# Patient Record
Sex: MALE | Race: WHITE | NOT HISPANIC OR LATINO | Employment: FULL TIME | URBAN - METROPOLITAN AREA
[De-identification: names, ages, dates, MRNs, and addresses within clinical notes are randomized per-mention and may not be internally consistent; named-entity substitution may affect disease eponyms.]

---

## 2019-03-16 ENCOUNTER — APPOINTMENT (OUTPATIENT)
Dept: RADIOLOGY | Facility: MEDICAL CENTER | Age: 59
DRG: 958 | End: 2019-03-16
Attending: EMERGENCY MEDICINE
Payer: COMMERCIAL

## 2019-03-16 ENCOUNTER — APPOINTMENT (OUTPATIENT)
Dept: RADIOLOGY | Facility: MEDICAL CENTER | Age: 59
DRG: 958 | End: 2019-03-16
Attending: NEUROLOGICAL SURGERY
Payer: COMMERCIAL

## 2019-03-16 ENCOUNTER — HOSPITAL ENCOUNTER (INPATIENT)
Facility: MEDICAL CENTER | Age: 59
LOS: 10 days | DRG: 958 | End: 2019-03-26
Attending: EMERGENCY MEDICINE | Admitting: SURGERY
Payer: COMMERCIAL

## 2019-03-16 DIAGNOSIS — K68.3 RETROPERITONEAL HEMATOMA: ICD-10-CM
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
## 2019-03-16 DIAGNOSIS — S32.009A CLOSED FRACTURE OF LUMBAR SPINE WITHOUT SPINAL CORD LESION, INITIAL ENCOUNTER (HCC): ICD-10-CM

## 2019-03-16 DIAGNOSIS — S73.004A DISLOCATION OF RIGHT HIP, INITIAL ENCOUNTER (HCC): ICD-10-CM

## 2019-03-16 DIAGNOSIS — S32.421A CLOSED DISPLACED FRACTURE OF POSTERIOR WALL OF RIGHT ACETABULUM, INITIAL ENCOUNTER (HCC): ICD-10-CM

## 2019-03-16 PROBLEM — S36.892A TRAUMATIC RETROPERITONEAL HEMATOMA: Status: ACTIVE | Noted: 2019-03-16

## 2019-03-16 PROBLEM — S32.409A CLOSED FRACTURE OF ACETABULUM (HCC): Status: ACTIVE | Noted: 2019-03-16

## 2019-03-16 PROBLEM — Z53.09 CONTRAINDICATION TO DEEP VEIN THROMBOSIS (DVT) PROPHYLAXIS: Status: ACTIVE | Noted: 2019-03-16

## 2019-03-16 PROBLEM — T14.90XA TRAUMA: Status: ACTIVE | Noted: 2019-03-16

## 2019-03-16 LAB
ABO GROUP BLD: NORMAL
ALBUMIN SERPL BCP-MCNC: 4.1 G/DL (ref 3.2–4.9)
ALBUMIN/GLOB SERPL: 2.1 G/DL
ALP SERPL-CCNC: 49 U/L (ref 30–99)
ALT SERPL-CCNC: 21 U/L (ref 2–50)
ANION GAP SERPL CALC-SCNC: 10 MMOL/L (ref 0–11.9)
AST SERPL-CCNC: 34 U/L (ref 12–45)
BILIRUB SERPL-MCNC: 1.2 MG/DL (ref 0.1–1.5)
BLD GP AB SCN SERPL QL: NORMAL
BUN SERPL-MCNC: 16 MG/DL (ref 8–22)
CALCIUM SERPL-MCNC: 9.4 MG/DL (ref 8.5–10.5)
CHLORIDE SERPL-SCNC: 106 MMOL/L (ref 96–112)
CO2 SERPL-SCNC: 24 MMOL/L (ref 20–33)
CREAT SERPL-MCNC: 1.31 MG/DL (ref 0.5–1.4)
ERYTHROCYTE [DISTWIDTH] IN BLOOD BY AUTOMATED COUNT: 41.2 FL (ref 35.9–50)
ETHANOL BLD-MCNC: 0.01 G/DL
GLOBULIN SER CALC-MCNC: 2 G/DL (ref 1.9–3.5)
GLUCOSE SERPL-MCNC: 144 MG/DL (ref 65–99)
HCT VFR BLD AUTO: 43.5 % (ref 42–52)
HGB BLD-MCNC: 13.9 G/DL (ref 14–18)
HGB BLD-MCNC: 15.6 G/DL (ref 14–18)
LACTATE BLD-SCNC: 1.5 MMOL/L (ref 0.5–2)
MCH RBC QN AUTO: 32.5 PG (ref 27–33)
MCHC RBC AUTO-ENTMCNC: 35.9 G/DL (ref 33.7–35.3)
MCV RBC AUTO: 90.6 FL (ref 81.4–97.8)
PLATELET # BLD AUTO: 256 K/UL (ref 164–446)
PMV BLD AUTO: 10.7 FL (ref 9–12.9)
POTASSIUM SERPL-SCNC: 3.9 MMOL/L (ref 3.6–5.5)
PROT SERPL-MCNC: 6.1 G/DL (ref 6–8.2)
RBC # BLD AUTO: 4.8 M/UL (ref 4.7–6.1)
RH BLD: NORMAL
SODIUM SERPL-SCNC: 140 MMOL/L (ref 135–145)
WBC # BLD AUTO: 18.4 K/UL (ref 4.8–10.8)

## 2019-03-16 PROCEDURE — 770022 HCHG ROOM/CARE - ICU (200)

## 2019-03-16 PROCEDURE — 85018 HEMOGLOBIN: CPT

## 2019-03-16 PROCEDURE — 86901 BLOOD TYPING SEROLOGIC RH(D): CPT

## 2019-03-16 PROCEDURE — 27250 TREAT HIP DISLOCATION: CPT

## 2019-03-16 PROCEDURE — 2W6NX0Z TRACTION OF RIGHT UPPER LEG USING TRACTION APPARATUS: ICD-10-PCS | Performed by: ORTHOPAEDIC SURGERY

## 2019-03-16 PROCEDURE — 700105 HCHG RX REV CODE 258: Performed by: NURSE PRACTITIONER

## 2019-03-16 PROCEDURE — 72125 CT NECK SPINE W/O DYE: CPT

## 2019-03-16 PROCEDURE — G0390 TRAUMA RESPONS W/HOSP CRITI: HCPCS

## 2019-03-16 PROCEDURE — 700117 HCHG RX CONTRAST REV CODE 255: Performed by: EMERGENCY MEDICINE

## 2019-03-16 PROCEDURE — A9270 NON-COVERED ITEM OR SERVICE: HCPCS | Performed by: NURSE PRACTITIONER

## 2019-03-16 PROCEDURE — 96374 THER/PROPH/DIAG INJ IV PUSH: CPT

## 2019-03-16 PROCEDURE — 51798 US URINE CAPACITY MEASURE: CPT

## 2019-03-16 PROCEDURE — 96376 TX/PRO/DX INJ SAME DRUG ADON: CPT

## 2019-03-16 PROCEDURE — 80053 COMPREHEN METABOLIC PANEL: CPT

## 2019-03-16 PROCEDURE — 96375 TX/PRO/DX INJ NEW DRUG ADDON: CPT

## 2019-03-16 PROCEDURE — 80307 DRUG TEST PRSMV CHEM ANLYZR: CPT

## 2019-03-16 PROCEDURE — 0SS9XZZ REPOSITION RIGHT HIP JOINT, EXTERNAL APPROACH: ICD-10-PCS | Performed by: EMERGENCY MEDICINE

## 2019-03-16 PROCEDURE — 700111 HCHG RX REV CODE 636 W/ 250 OVERRIDE (IP): Performed by: ORTHOPAEDIC SURGERY

## 2019-03-16 PROCEDURE — 700102 HCHG RX REV CODE 250 W/ 637 OVERRIDE(OP): Performed by: NURSE PRACTITIONER

## 2019-03-16 PROCEDURE — 86900 BLOOD TYPING SEROLOGIC ABO: CPT

## 2019-03-16 PROCEDURE — 72170 X-RAY EXAM OF PELVIS: CPT

## 2019-03-16 PROCEDURE — 85384 FIBRINOGEN ACTIVITY: CPT

## 2019-03-16 PROCEDURE — 72128 CT CHEST SPINE W/O DYE: CPT

## 2019-03-16 PROCEDURE — 72148 MRI LUMBAR SPINE W/O DYE: CPT

## 2019-03-16 PROCEDURE — 86850 RBC ANTIBODY SCREEN: CPT

## 2019-03-16 PROCEDURE — 83605 ASSAY OF LACTIC ACID: CPT

## 2019-03-16 PROCEDURE — 700111 HCHG RX REV CODE 636 W/ 250 OVERRIDE (IP): Performed by: NURSE PRACTITIONER

## 2019-03-16 PROCEDURE — 304561 HCHG STAT O2

## 2019-03-16 PROCEDURE — 85576 BLOOD PLATELET AGGREGATION: CPT | Mod: 91

## 2019-03-16 PROCEDURE — 85027 COMPLETE CBC AUTOMATED: CPT

## 2019-03-16 PROCEDURE — 85347 COAGULATION TIME ACTIVATED: CPT

## 2019-03-16 PROCEDURE — 99291 CRITICAL CARE FIRST HOUR: CPT

## 2019-03-16 PROCEDURE — 71260 CT THORAX DX C+: CPT

## 2019-03-16 PROCEDURE — 0QS4XZZ REPOSITION RIGHT ACETABULUM, EXTERNAL APPROACH: ICD-10-PCS | Performed by: ORTHOPAEDIC SURGERY

## 2019-03-16 PROCEDURE — 71045 X-RAY EXAM CHEST 1 VIEW: CPT

## 2019-03-16 PROCEDURE — 72131 CT LUMBAR SPINE W/O DYE: CPT

## 2019-03-16 PROCEDURE — 70450 CT HEAD/BRAIN W/O DYE: CPT

## 2019-03-16 PROCEDURE — 700111 HCHG RX REV CODE 636 W/ 250 OVERRIDE (IP): Performed by: EMERGENCY MEDICINE

## 2019-03-16 RX ORDER — AMOXICILLIN 250 MG
1 CAPSULE ORAL NIGHTLY
Status: DISCONTINUED | OUTPATIENT
Start: 2019-03-16 | End: 2019-03-18

## 2019-03-16 RX ORDER — ACETAMINOPHEN 500 MG
1000 TABLET ORAL EVERY 6 HOURS
Status: DISCONTINUED | OUTPATIENT
Start: 2019-03-16 | End: 2019-03-18

## 2019-03-16 RX ORDER — FAMOTIDINE 20 MG/1
20 TABLET, FILM COATED ORAL 2 TIMES DAILY
Status: DISCONTINUED | OUTPATIENT
Start: 2019-03-16 | End: 2019-03-19

## 2019-03-16 RX ORDER — POLYETHYLENE GLYCOL 3350 17 G/17G
1 POWDER, FOR SOLUTION ORAL 2 TIMES DAILY
Status: DISCONTINUED | OUTPATIENT
Start: 2019-03-16 | End: 2019-03-18

## 2019-03-16 RX ORDER — BISACODYL 10 MG
10 SUPPOSITORY, RECTAL RECTAL
Status: DISCONTINUED | OUTPATIENT
Start: 2019-03-16 | End: 2019-03-18

## 2019-03-16 RX ORDER — ACETAMINOPHEN 325 MG/1
650 TABLET ORAL EVERY 4 HOURS PRN
Status: DISCONTINUED | OUTPATIENT
Start: 2019-03-16 | End: 2019-03-26 | Stop reason: HOSPADM

## 2019-03-16 RX ORDER — ENEMA 19; 7 G/133ML; G/133ML
1 ENEMA RECTAL
Status: DISCONTINUED | OUTPATIENT
Start: 2019-03-16 | End: 2019-03-18

## 2019-03-16 RX ORDER — HYDROMORPHONE HYDROCHLORIDE 1 MG/ML
1 INJECTION, SOLUTION INTRAMUSCULAR; INTRAVENOUS; SUBCUTANEOUS ONCE
Status: COMPLETED | OUTPATIENT
Start: 2019-03-16 | End: 2019-03-16

## 2019-03-16 RX ORDER — PROPOFOL 10 MG/ML
200 INJECTION, EMULSION INTRAVENOUS ONCE
Status: COMPLETED | OUTPATIENT
Start: 2019-03-16 | End: 2019-03-16

## 2019-03-16 RX ORDER — M-VIT,TX,IRON,MINS/CALC/FOLIC 27MG-0.4MG
1 TABLET ORAL DAILY
Status: ON HOLD | COMMUNITY
End: 2019-04-01

## 2019-03-16 RX ORDER — LORAZEPAM 2 MG/ML
INJECTION INTRAMUSCULAR
Status: COMPLETED | OUTPATIENT
Start: 2019-03-16 | End: 2019-03-16

## 2019-03-16 RX ORDER — AMOXICILLIN 250 MG
1 CAPSULE ORAL
Status: DISCONTINUED | OUTPATIENT
Start: 2019-03-16 | End: 2019-03-18

## 2019-03-16 RX ORDER — ONDANSETRON 2 MG/ML
4 INJECTION INTRAMUSCULAR; INTRAVENOUS EVERY 4 HOURS PRN
Status: DISCONTINUED | OUTPATIENT
Start: 2019-03-16 | End: 2019-03-18

## 2019-03-16 RX ORDER — DOCUSATE SODIUM 100 MG/1
100 CAPSULE, LIQUID FILLED ORAL 2 TIMES DAILY
Status: DISCONTINUED | OUTPATIENT
Start: 2019-03-16 | End: 2019-03-18

## 2019-03-16 RX ORDER — OXYCODONE HYDROCHLORIDE 5 MG/1
5 TABLET ORAL
Status: DISCONTINUED | OUTPATIENT
Start: 2019-03-16 | End: 2019-03-18

## 2019-03-16 RX ORDER — ACETAMINOPHEN 650 MG/1
650 SUPPOSITORY RECTAL EVERY 4 HOURS PRN
Status: DISCONTINUED | OUTPATIENT
Start: 2019-03-16 | End: 2019-03-26 | Stop reason: HOSPADM

## 2019-03-16 RX ORDER — OXYCODONE HYDROCHLORIDE 10 MG/1
10 TABLET ORAL
Status: DISCONTINUED | OUTPATIENT
Start: 2019-03-16 | End: 2019-03-18

## 2019-03-16 RX ORDER — MORPHINE SULFATE 4 MG/ML
4 INJECTION, SOLUTION INTRAMUSCULAR; INTRAVENOUS
Status: DISCONTINUED | OUTPATIENT
Start: 2019-03-16 | End: 2019-03-26 | Stop reason: HOSPADM

## 2019-03-16 RX ORDER — SODIUM CHLORIDE 9 MG/ML
INJECTION, SOLUTION INTRAVENOUS CONTINUOUS
Status: DISCONTINUED | OUTPATIENT
Start: 2019-03-16 | End: 2019-03-20

## 2019-03-16 RX ADMIN — PROPOFOL 100 MG: 10 INJECTION, EMULSION INTRAVENOUS at 20:55

## 2019-03-16 RX ADMIN — PROPOFOL 25 MG: 10 INJECTION, EMULSION INTRAVENOUS at 16:00

## 2019-03-16 RX ADMIN — OXYCODONE HYDROCHLORIDE 10 MG: 10 TABLET ORAL at 23:03

## 2019-03-16 RX ADMIN — FENTANYL CITRATE 100 MCG: 50 INJECTION INTRAMUSCULAR; INTRAVENOUS at 20:55

## 2019-03-16 RX ADMIN — OXYCODONE HYDROCHLORIDE 10 MG: 10 TABLET ORAL at 20:00

## 2019-03-16 RX ADMIN — PROPOFOL 50 MG: 10 INJECTION, EMULSION INTRAVENOUS at 15:55

## 2019-03-16 RX ADMIN — MORPHINE SULFATE 4 MG: 4 INJECTION INTRAVENOUS at 20:45

## 2019-03-16 RX ADMIN — HYDROMORPHONE HYDROCHLORIDE 1 MG: 1 INJECTION, SOLUTION INTRAMUSCULAR; INTRAVENOUS; SUBCUTANEOUS at 15:38

## 2019-03-16 RX ADMIN — MORPHINE SULFATE 4 MG: 4 INJECTION INTRAVENOUS at 18:07

## 2019-03-16 RX ADMIN — SODIUM CHLORIDE: 9 INJECTION, SOLUTION INTRAVENOUS at 22:00

## 2019-03-16 RX ADMIN — IOHEXOL 100 ML: 350 INJECTION, SOLUTION INTRAVENOUS at 16:37

## 2019-03-16 RX ADMIN — FAMOTIDINE 20 MG: 20 TABLET ORAL at 19:53

## 2019-03-16 RX ADMIN — LORAZEPAM 1 MG: 2 INJECTION INTRAMUSCULAR; INTRAVENOUS at 15:44

## 2019-03-16 RX ADMIN — HYDROMORPHONE HYDROCHLORIDE 1 MG: 1 INJECTION, SOLUTION INTRAMUSCULAR; INTRAVENOUS; SUBCUTANEOUS at 16:39

## 2019-03-16 ASSESSMENT — COPD QUESTIONNAIRES
COPD SCREENING SCORE: 1
DURING THE PAST 4 WEEKS HOW MUCH DID YOU FEEL SHORT OF BREATH: NONE/LITTLE OF THE TIME
HAVE YOU SMOKED AT LEAST 100 CIGARETTES IN YOUR ENTIRE LIFE: NO/DON'T KNOW
DO YOU EVER COUGH UP ANY MUCUS OR PHLEGM?: NO/ONLY WITH OCCASIONAL COLDS OR INFECTIONS

## 2019-03-16 ASSESSMENT — LIFESTYLE VARIABLES
ALCOHOL_USE: NO
EVER_SMOKED: YES

## 2019-03-16 ASSESSMENT — PATIENT HEALTH QUESTIONNAIRE - PHQ9
1. LITTLE INTEREST OR PLEASURE IN DOING THINGS: NOT AT ALL
SUM OF ALL RESPONSES TO PHQ9 QUESTIONS 1 AND 2: 0
2. FEELING DOWN, DEPRESSED, IRRITABLE, OR HOPELESS: NOT AT ALL
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
## 2019-03-16 NOTE — ED NOTES
Received report from terry grimes assumed care done.  Pt c/o pain in right hip rates as 7/10. erp notified. Pt now awake and alert. VSS.

## 2019-03-16 NOTE — ED NOTES
Med Rec Updated and Complete per Pt at bedside  Allergies Reviewed  No PO ABX last 30 days.    Pt denies RX medication at this time.
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
## 2019-03-17 ENCOUNTER — APPOINTMENT (OUTPATIENT)
Dept: RADIOLOGY | Facility: MEDICAL CENTER | Age: 59
DRG: 958 | End: 2019-03-17
Attending: NURSE PRACTITIONER
Payer: COMMERCIAL

## 2019-03-17 LAB
ABO GROUP BLD: NORMAL
ALBUMIN SERPL BCP-MCNC: 3.8 G/DL (ref 3.2–4.9)
ALBUMIN/GLOB SERPL: 2.1 G/DL
ALP SERPL-CCNC: 39 U/L (ref 30–99)
ALT SERPL-CCNC: 25 U/L (ref 2–50)
ANION GAP SERPL CALC-SCNC: 8 MMOL/L (ref 0–11.9)
APTT PPP: 29.9 SEC (ref 24.7–36)
AST SERPL-CCNC: 53 U/L (ref 12–45)
BASOPHILS # BLD AUTO: 0.2 % (ref 0–1.8)
BASOPHILS # BLD: 0.02 K/UL (ref 0–0.12)
BILIRUB SERPL-MCNC: 1.6 MG/DL (ref 0.1–1.5)
BUN SERPL-MCNC: 13 MG/DL (ref 8–22)
CALCIUM SERPL-MCNC: 8.3 MG/DL (ref 8.5–10.5)
CFT BLD TEG: 2.9 MIN (ref 5–10)
CHLORIDE SERPL-SCNC: 107 MMOL/L (ref 96–112)
CLOT ANGLE BLD TEG: 72.2 DEGREES (ref 53–72)
CO2 SERPL-SCNC: 25 MMOL/L (ref 20–33)
CREAT SERPL-MCNC: 0.98 MG/DL (ref 0.5–1.4)
CT.EXTRINSIC BLD ROTEM: 1.5 MIN (ref 1–3)
EOSINOPHIL # BLD AUTO: 0.01 K/UL (ref 0–0.51)
EOSINOPHIL NFR BLD: 0.1 % (ref 0–6.9)
ERYTHROCYTE [DISTWIDTH] IN BLOOD BY AUTOMATED COUNT: 44.7 FL (ref 35.9–50)
GLOBULIN SER CALC-MCNC: 1.8 G/DL (ref 1.9–3.5)
GLUCOSE SERPL-MCNC: 135 MG/DL (ref 65–99)
HCT VFR BLD AUTO: 38 % (ref 42–52)
HGB BLD-MCNC: 12.1 G/DL (ref 14–18)
HGB BLD-MCNC: 13 G/DL (ref 14–18)
IMM GRANULOCYTES # BLD AUTO: 0.03 K/UL (ref 0–0.11)
IMM GRANULOCYTES NFR BLD AUTO: 0.4 % (ref 0–0.9)
INR PPP: 1.13 (ref 0.87–1.13)
LYMPHOCYTES # BLD AUTO: 0.88 K/UL (ref 1–4.8)
LYMPHOCYTES NFR BLD: 10.9 % (ref 22–41)
MCF BLD TEG: 66.3 MM (ref 50–70)
MCH RBC QN AUTO: 32.3 PG (ref 27–33)
MCHC RBC AUTO-ENTMCNC: 34.2 G/DL (ref 33.7–35.3)
MCV RBC AUTO: 94.5 FL (ref 81.4–97.8)
MONOCYTES # BLD AUTO: 0.96 K/UL (ref 0–0.85)
MONOCYTES NFR BLD AUTO: 11.9 % (ref 0–13.4)
NEUTROPHILS # BLD AUTO: 6.15 K/UL (ref 1.82–7.42)
NEUTROPHILS NFR BLD: 76.5 % (ref 44–72)
NRBC # BLD AUTO: 0 K/UL
NRBC BLD-RTO: 0 /100 WBC
PA AA BLD-ACNC: 18 %
PA ADP BLD-ACNC: 25.2 %
PLATELET # BLD AUTO: 201 K/UL (ref 164–446)
PMV BLD AUTO: 10.7 FL (ref 9–12.9)
POTASSIUM SERPL-SCNC: 4 MMOL/L (ref 3.6–5.5)
PROT SERPL-MCNC: 5.6 G/DL (ref 6–8.2)
PROTHROMBIN TIME: 14.6 SEC (ref 12–14.6)
RBC # BLD AUTO: 4.02 M/UL (ref 4.7–6.1)
RH BLD: NORMAL
SODIUM SERPL-SCNC: 140 MMOL/L (ref 135–145)
TEG ALGORITHM TGALG: ABNORMAL
WBC # BLD AUTO: 8.1 K/UL (ref 4.8–10.8)

## 2019-03-17 PROCEDURE — 85610 PROTHROMBIN TIME: CPT

## 2019-03-17 PROCEDURE — 85025 COMPLETE CBC W/AUTO DIFF WBC: CPT

## 2019-03-17 PROCEDURE — 71045 X-RAY EXAM CHEST 1 VIEW: CPT | Performed by: RADIOLOGY

## 2019-03-17 PROCEDURE — 770022 HCHG ROOM/CARE - ICU (200)

## 2019-03-17 PROCEDURE — 85018 HEMOGLOBIN: CPT

## 2019-03-17 PROCEDURE — 700112 HCHG RX REV CODE 229: Performed by: NURSE PRACTITIONER

## 2019-03-17 PROCEDURE — A9270 NON-COVERED ITEM OR SERVICE: HCPCS | Performed by: NURSE PRACTITIONER

## 2019-03-17 PROCEDURE — 700105 HCHG RX REV CODE 258: Performed by: NURSE PRACTITIONER

## 2019-03-17 PROCEDURE — 93970 EXTREMITY STUDY: CPT

## 2019-03-17 PROCEDURE — 99291 CRITICAL CARE FIRST HOUR: CPT | Performed by: SURGERY

## 2019-03-17 PROCEDURE — 80053 COMPREHEN METABOLIC PANEL: CPT

## 2019-03-17 PROCEDURE — 71045 X-RAY EXAM CHEST 1 VIEW: CPT

## 2019-03-17 PROCEDURE — 700111 HCHG RX REV CODE 636 W/ 250 OVERRIDE (IP): Performed by: NURSE PRACTITIONER

## 2019-03-17 PROCEDURE — 700102 HCHG RX REV CODE 250 W/ 637 OVERRIDE(OP): Performed by: NURSE PRACTITIONER

## 2019-03-17 PROCEDURE — 51798 US URINE CAPACITY MEASURE: CPT

## 2019-03-17 PROCEDURE — 85730 THROMBOPLASTIN TIME PARTIAL: CPT

## 2019-03-17 PROCEDURE — 93970 EXTREMITY STUDY: CPT | Mod: 26 | Performed by: SURGERY

## 2019-03-17 RX ADMIN — ONDANSETRON 4 MG: 2 INJECTION INTRAMUSCULAR; INTRAVENOUS at 23:17

## 2019-03-17 RX ADMIN — SENNOSIDES AND DOCUSATE SODIUM 1 TABLET: 8.6; 5 TABLET ORAL at 21:22

## 2019-03-17 RX ADMIN — DOCUSATE SODIUM 100 MG: 100 CAPSULE, LIQUID FILLED ORAL at 17:11

## 2019-03-17 RX ADMIN — MORPHINE SULFATE 4 MG: 4 INJECTION INTRAVENOUS at 17:11

## 2019-03-17 RX ADMIN — ACETAMINOPHEN 1000 MG: 500 TABLET ORAL at 17:11

## 2019-03-17 RX ADMIN — OXYCODONE HYDROCHLORIDE 10 MG: 10 TABLET ORAL at 22:35

## 2019-03-17 RX ADMIN — OXYCODONE HYDROCHLORIDE 10 MG: 10 TABLET ORAL at 16:08

## 2019-03-17 RX ADMIN — ACETAMINOPHEN 1000 MG: 500 TABLET ORAL at 12:12

## 2019-03-17 RX ADMIN — OXYCODONE HYDROCHLORIDE 10 MG: 10 TABLET ORAL at 06:00

## 2019-03-17 RX ADMIN — OXYCODONE HYDROCHLORIDE 10 MG: 10 TABLET ORAL at 19:30

## 2019-03-17 RX ADMIN — MORPHINE SULFATE 4 MG: 4 INJECTION INTRAVENOUS at 23:17

## 2019-03-17 RX ADMIN — MORPHINE SULFATE 4 MG: 4 INJECTION INTRAVENOUS at 13:31

## 2019-03-17 RX ADMIN — FAMOTIDINE 20 MG: 20 TABLET ORAL at 05:04

## 2019-03-17 RX ADMIN — SODIUM CHLORIDE: 9 INJECTION, SOLUTION INTRAVENOUS at 07:21

## 2019-03-17 RX ADMIN — OXYCODONE HYDROCHLORIDE 10 MG: 10 TABLET ORAL at 02:32

## 2019-03-17 RX ADMIN — FAMOTIDINE 20 MG: 20 TABLET ORAL at 17:11

## 2019-03-17 RX ADMIN — DOCUSATE SODIUM 100 MG: 100 CAPSULE, LIQUID FILLED ORAL at 05:04

## 2019-03-17 RX ADMIN — OXYCODONE HYDROCHLORIDE 10 MG: 10 TABLET ORAL at 09:02

## 2019-03-17 RX ADMIN — MORPHINE SULFATE 4 MG: 4 INJECTION INTRAVENOUS at 10:34

## 2019-03-17 RX ADMIN — MORPHINE SULFATE 4 MG: 4 INJECTION INTRAVENOUS at 20:11

## 2019-03-17 RX ADMIN — MORPHINE SULFATE 4 MG: 4 INJECTION INTRAVENOUS at 04:00

## 2019-03-17 RX ADMIN — MORPHINE SULFATE 4 MG: 4 INJECTION INTRAVENOUS at 07:10

## 2019-03-17 RX ADMIN — ACETAMINOPHEN 1000 MG: 500 TABLET ORAL at 05:04

## 2019-03-17 RX ADMIN — OXYCODONE HYDROCHLORIDE 10 MG: 10 TABLET ORAL at 12:12

## 2019-03-17 RX ADMIN — MORPHINE SULFATE 4 MG: 4 INJECTION INTRAVENOUS at 00:30

## 2019-03-17 ASSESSMENT — COGNITIVE AND FUNCTIONAL STATUS - GENERAL
SUGGESTED CMS G CODE MODIFIER MOBILITY: CM
WALKING IN HOSPITAL ROOM: TOTAL
MOVING TO AND FROM BED TO CHAIR: A LOT
MOVING FROM LYING ON BACK TO SITTING ON SIDE OF FLAT BED: A LOT
DAILY ACTIVITIY SCORE: 13
SUGGESTED CMS G CODE MODIFIER DAILY ACTIVITY: CL
DRESSING REGULAR UPPER BODY CLOTHING: A LOT
TOILETING: A LOT
CLIMB 3 TO 5 STEPS WITH RAILING: TOTAL
STANDING UP FROM CHAIR USING ARMS: TOTAL
DRESSING REGULAR LOWER BODY CLOTHING: A LOT
MOBILITY SCORE: 9
HELP NEEDED FOR BATHING: A LOT
EATING MEALS: A LITTLE
TURNING FROM BACK TO SIDE WHILE IN FLAT BAD: A LOT
PERSONAL GROOMING: A LOT

## 2019-03-17 ASSESSMENT — ENCOUNTER SYMPTOMS
CARDIOVASCULAR NEGATIVE: 1
RESPIRATORY NEGATIVE: 1
CONSTITUTIONAL NEGATIVE: 1

## 2019-03-17 NOTE — PROGRESS NOTES
Neurosurgery Progress Note    Subjective:  The patient continues to complain of profound right leg weakness  Currently in traction  Denies neck pain, UE N/T, weakness     Exam:  Awake, alert, oriented x3, motor strength in the left leg is 4- out of 5,   right leg is now in traction  Able to wiggle toes, 3- dorsiflexion and plantar flexion  Sensation is diminished in the right leg from L4-S1  UE strength 5/5 deltoid, biceps, triceps, handgrip   Full sensation C4-T1   In C collar     BP  Min: 115/72  Max: 151/105  Pulse  Av.7  Min: 73  Max: 108  Resp  Av.2  Min: 5  Max: 57  Temp  Av.5 °C (97.7 °F)  Min: 36.3 °C (97.3 °F)  Max: 36.8 °C (98.2 °F)  SpO2  Av.6 %  Min: 90 %  Max: 100 %    No Data Recorded    Recent Labs      19   1535  19   2315  19   0530   WBC  18.4*   --   8.1   RBC  4.80   --   4.02*   HEMOGLOBIN  15.6  13.9*  13.0*   HEMATOCRIT  43.5   --   38.0*   MCV  90.6   --   94.5   MCH  32.5   --   32.3   MCHC  35.9*   --   34.2   RDW  41.2   --   44.7   PLATELETCT  256   --   201   MPV  10.7   --   10.7     Recent Labs      19   1535  19   0530   SODIUM  140  140   POTASSIUM  3.9  4.0   CHLORIDE  106  107   CO2  24  25   GLUCOSE  144*  135*   BUN  16  13   CREATININE  1.31  0.98   CALCIUM  9.4  8.3*         Recent Labs      19   2315   REACTMIN  2.9*   CLOTKINET  1.5   CLOTANGL  72.2*   MAXCLOTS  66.3   PRCINADP  25.2   PRCINAA  18.0       Intake/Output       19 0700 - 19 0659 19 07 - 19 0659      4552-3248 8811-9717 Total 7027-7632 5409-6162 Total       Intake    I.V.  200  1005 1205  --  -- --    Pre-Hospital Volume 200 -- 200 -- -- --    Trauma Resuscitation Volume 0 -- 0 -- -- --    Propofol Volume -- 5 5 -- -- --    Volume (mL) (NS infusion) -- 1000 1000 -- -- --    Blood  0  -- 0  --  -- --    PRBC Total Volume (Non-Barcoded) 0 -- 0 -- -- --    FFP Total Volume (Non-Barcoded) 0 -- 0 -- -- --    Platelets Total Volume  (Non-Barcoded) 0 -- 0 -- -- --    Cryoprecipitate (Pooled) Total Volume (Non-Barcoded) 0 -- 0 -- -- --    Total Intake 200 1005 1205 -- -- --       Output    Urine  --  450 450  --  -- --    Straight Catheter -- 450 450 -- -- --    Other  0  -- 0  --  -- --    Pre-Hospital Output 0 -- 0 -- -- --    Trauma Resuscitation Output 0 -- 0 -- -- --    Blood  0  -- 0  --  -- --    Est. Blood Loss 0 -- 0 -- -- --    Total Output 0 450 450 -- -- --       Net I/O     200 555 755 -- -- --            Intake/Output Summary (Last 24 hours) at 03/17/19 0951  Last data filed at 03/17/19 0600   Gross per 24 hour   Intake             1205 ml   Output              450 ml   Net              755 ml       $ Bladder Scan Results (mL): 246    • Respiratory Care per Protocol   Continuous RT   • Pharmacy Consult Request  1 Each PHARMACY TO DOSE   • docusate sodium  100 mg BID   • senna-docusate  1 Tab Nightly   • senna-docusate  1 Tab Q24HRS PRN   • polyethylene glycol/lytes  1 Packet BID   • magnesium hydroxide  30 mL DAILY   • bisacodyl  10 mg Q24HRS PRN   • fleet  1 Each Once PRN   • NS   Continuous   • acetaminophen  1,000 mg Q6HRS   • oxyCODONE immediate-release  5 mg Q3HRS PRN   • oxyCODONE immediate release  10 mg Q3HRS PRN   • morphine injection  4 mg Q3HRS PRN   • ondansetron  4 mg Q4HRS PRN   • famotidine  20 mg BID    Or   • famotidine  20 mg BID   • acetaminophen  650 mg Q4HRS PRN    Or   • acetaminophen  650 mg Q4HRS PRN       Assessment and Plan:  Hospital day #2  Prophylactic anticoagulation: yes         Start date/time: OK for today, stop at 1200 on 3/18  Plan for ORIF tomorrow by Dr. Aldana  Will look to schedule patient for L4-S1 ALIF and L4-S1 laminectomy, instrumentation, fusion  Risks, benefits, alternatives to surgery were discussed with the patient. I explained the surgery using spine diagrams.   He is eager to proceed with surgery  Consent ordered     Patient agrees with treatment plan.   Case discussed with   Toni.

## 2019-03-17 NOTE — PROGRESS NOTES
Call to ER to see if patient is ready for . Per ER RN, patient to go to STAT MRI. ER RN to call SICU RN back if SICU RN needs to take patient to MRI
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
## 2019-03-17 NOTE — RESPIRATORY CARE
Conscious Sedation Respiratory Update       Pt. Placed on end tidal co2 cannula. With a flow of 10 lpm.  Ambul bag at bedside, Oxymask added at 15 lpm.  Pt. Is semi conscious during leg adjustment by ortho Dr.   Procedure went well.  END Tidal CO2 range from 17-31 cwp, And later 38cwp post medications.

## 2019-03-17 NOTE — ED NOTES
Bedside report to Zulema DEUTSCH. Pt transported to MRI by SICU RN on zoll monitor w/stable vitals accompanied by another ED RN for MRI transport assistance.
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
## 2019-03-17 NOTE — PROGRESS NOTES
Continued from previous note for procedure.     2119- Dr. Lunsford at bedside for potential for airway manage.     2120- 50mg propofol administered per MD order.   2122- Dr. Aldana per formed pin placement. Patient VSS through procedure.   End Tidal CO2 38    Traction placed to patient leg at 20lbs.    2123- End procedure time

## 2019-03-17 NOTE — PROGRESS NOTES
Neurosurgery Progress Note    Subjective:  The patient continues to complain of profound right leg weakness    Exam:  Awake, alert, oriented x3, motor strength in the left leg is 4 out of 5, right leg is now in traction, right leg is still profoundly weak as it has been since admission, sensation is diminished in the right leg from L4-S1    CT lumbar 3/16/19    FINDINGS:  The lumbar vertebral bodies and are normal in height.    Lumbar vertebral bodies are normally aligned.    There are right L1-L5 transverse process fracture.    There is fracture of the right L5 pedicle extending into the spinous process in the posterior aspect of the vertebral body. There is subluxation at the right L5-S1 facet joint.    There are fractures of the left L3-L5 transverse processes.    There is left L5 lamina fracture    There is likely anterior epidural hematoma of the lower lumbar spine. Assessment on CT is somewhat limited. It is very likely there is significant spinal canal narrowing at L3-L5    There is a left L5 pars articularis fracture.    There is an L4 spinous process fracture.    The vertebral bodies appear intact.    There is posterior dislocation of the left hip joint.      BP  Min: 115/72  Max: 151/105  Pulse  Av  Min: 89  Max: 108  Resp  Av.4  Min: 5  Max: 48  Temp  Av.5 °C (97.7 °F)  Min: 36.3 °C (97.3 °F)  Max: 36.8 °C (98.2 °F)  SpO2  Av.5 %  Min: 90 %  Max: 100 %    No Data Recorded    Recent Labs      19   1535  19   2315  19   0530   WBC  18.4*   --   8.1   RBC  4.80   --   4.02*   HEMOGLOBIN  15.6  13.9*  13.0*   HEMATOCRIT  43.5   --   38.0*   MCV  90.6   --   94.5   MCH  32.5   --   32.3   MCHC  35.9*   --   34.2   RDW  41.2   --   44.7   PLATELETCT  256   --   201   MPV  10.7   --   10.7     Recent Labs      19   1535  19   0530   SODIUM  140  140   POTASSIUM  3.9  4.0   CHLORIDE  106  107   CO2  24  25   GLUCOSE  144*  135*   BUN  16  13   CREATININE  1.31   0.98   CALCIUM  9.4  8.3*         Recent Labs      03/16/19   2315   REACTMIN  2.9*   CLOTKINET  1.5   CLOTANGL  72.2*   MAXCLOTS  66.3   PRCINADP  25.2   PRCINAA  18.0       Intake/Output       03/16/19 0700 - 03/17/19 0659 03/17/19 0700 - 03/18/19 0659      [id number] 0924-5155 Total 0700-1859 1900-0659 Total       Intake    I.V.  200  -- 200  --  -- --    Pre-Hospital Volume 200 -- 200 -- -- --    Trauma Resuscitation Volume 0 -- 0 -- -- --    Blood  0  -- 0  --  -- --    PRBC Total Volume (Non-Barcoded) 0 -- 0 -- -- --    FFP Total Volume (Non-Barcoded) 0 -- 0 -- -- --    Platelets Total Volume (Non-Barcoded) 0 -- 0 -- -- --    Cryoprecipitate (Pooled) Total Volume (Non-Barcoded) 0 -- 0 -- -- --    Total Intake 200 -- 200 -- -- --       Output    Other  0  -- 0  --  -- --    Pre-Hospital Output 0 -- 0 -- -- --    Trauma Resuscitation Output 0 -- 0 -- -- --    Blood  0  -- 0  --  -- --    Est. Blood Loss 0 -- 0 -- -- --    Total Output 0 -- 0 -- -- --       Net I/O     200 -- 200 -- -- --            Intake/Output Summary (Last 24 hours) at 03/17/19 0816  Last data filed at 03/16/19 1549   Gross per 24 hour   Intake              200 ml   Output                0 ml   Net              200 ml       $ Bladder Scan Results (mL): 528    • Respiratory Care per Protocol   Continuous RT   • Pharmacy Consult Request  1 Each PHARMACY TO DOSE   • docusate sodium  100 mg BID   • senna-docusate  1 Tab Nightly   • senna-docusate  1 Tab Q24HRS PRN   • polyethylene glycol/lytes  1 Packet BID   • magnesium hydroxide  30 mL DAILY   • bisacodyl  10 mg Q24HRS PRN   • fleet  1 Each Once PRN   • NS   Continuous   • acetaminophen  1,000 mg Q6HRS   • oxyCODONE immediate-release  5 mg Q3HRS PRN   • oxyCODONE immediate release  10 mg Q3HRS PRN   • morphine injection  4 mg Q3HRS PRN   • ondansetron  4 mg Q4HRS PRN   • famotidine  20 mg BID    Or   • famotidine  20 mg BID   • acetaminophen  650 mg Q4HRS PRN    Or   • acetaminophen  650 mg  Q4HRS PRN   • fentaNYL          Assessment and Plan:  Hospital day #2  POD #na  Prophylactic anticoagulation: yes         Start date/time: To be determined    The patient was seen and examined last night in the ICU for neurosurgical consultation.  I do not believe that the right leg and ankle weakness is related to his spine fractures.  Dr. Hope is taking the patient to surgery tomorrow.     I will look at times Tuesday for a 360 from L4-S1.    Risks, benefits, options discussed.  Patient wants to proceed with spine surgery.
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
## 2019-03-17 NOTE — PROGRESS NOTES
Skeletal traction in working order.  If any further assistance needed, please call extension [phone] or place order for Ortho Technician assistance as a communication order in [employer].
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
## 2019-03-17 NOTE — ASSESSMENT & PLAN NOTE
Large retroperitoneal hematoma, right greater than left which involves the paraspinous and psoas muscles.  CT also demonstrates extraperitoneal hematoma  Daily hemoglobins

## 2019-03-17 NOTE — PROGRESS NOTES
Dr. Hope at bedside for acetabular reduction for hip fracture with pin placement and traction application.     2 patient identifier completed.  Time out called for procedure, all agree.   RT and RN at bedside for conscious sedation procedure.   Consents signed for procedure.     Order for propofol for sedation and fentanyl for pain management.     50mg propofol administered per MD order.  100 mcg fentanyl administered per MD order.     Patient VS    /81  SPO2 100 on 6L NC   End Tidal CO2 33      Procedure time out called, patient de-sating to 70's. Oxy mask applied for added oxygenation. Dr. Lunsford called for bedside evaluation for airway protection during procedure.

## 2019-03-17 NOTE — PROGRESS NOTES
This RN performs straight cath at 0215, pt tolerates procedure well. 450cc of yellow, clear urine obtained.
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
## 2019-03-17 NOTE — ASSESSMENT & PLAN NOTE
Initial systemic anticoagulation contraindicated secondary to elevated bleeding risk.   3/17: Surveillance venous duplex scanning negative for DVT  3/23 Chemical DVT prophylaxis (Lovenox) initiated 24 post drain removal

## 2019-03-17 NOTE — CARE PLAN
Problem: Pain Management  Goal: Pain level will decrease to patient's comfort goal  Outcome: PROGRESSING AS EXPECTED   03/17/19 1034   OTHER   Pain Rating Scale (NPRS) 8   Comfort Goal 1;Comfort at Rest     Assessing pain q2 hours and PRN, medicating per MAR for a goal of comfort at rest.    Problem: Urinary Elimination:  Goal: Ability to reestablish a normal urinary elimination pattern will improve  Outcome: PROGRESSING SLOWER THAN EXPECTED  Attempting to have patient void in urinal, bladder scan at 0800 showed 246ml, will reassess.
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
## 2019-03-17 NOTE — PROGRESS NOTES
"Orthopaedic Progress Note    Author: Baljinder Taylor Date & Time created: 3/17/2019   4:17 PM     Interval Events:  Doing well pain controlled   To OR tomorrow am for R acetabular ORIF  NPO after midnight    Review of Systems   Constitutional: Negative.    Respiratory: Negative.    Cardiovascular: Negative.    Musculoskeletal:        Pain controlled    Neurological:        Generalized decreased sensation of left foot distal to knee weakness with dorsiflexion of ankle and extension of toes.     Hemodynamics:  Blood pressure 115/72, pulse 82, temperature 37.1 °C (98.7 °F), temperature source Temporal, resp. rate (!) 11, height 1.778 m (5' 10\"), weight 70.2 kg (154 lb 12.2 oz), SpO2 96 %.     No Active Precaution Orders    Respiratory:    Respiration: (!) 11, Pulse Oximetry: 96 %, O2 Daily Delivery Respiratory : Silicone Nasal Cannula     Work Of Breathing / Effort: Mild  RUL Breath Sounds: Clear, RML Breath Sounds: Clear, RLL Breath Sounds: Diminished, MAYRA Breath Sounds: Clear, LLL Breath Sounds: Diminished  Fluids:    Intake/Output Summary (Last 24 hours) at 03/17/19 1617  Last data filed at 03/17/19 1600   Gross per 24 hour   Intake             2605 ml   Output              925 ml   Net             1680 ml     Admit Weight: 79.4 kg (175 lb)  Current Weight: 70.2 kg (154 lb 12.2 oz)    Physical Exam   Constitutional: He is oriented to person, place, and time. He appears well-developed and well-nourished. No distress.   HENT:   Head: Normocephalic.   Cardiovascular: Normal rate and regular rhythm.    Pulmonary/Chest: Effort normal. No respiratory distress.   Musculoskeletal:   RLE skeletal traction pin in place, ankle plantar flexion 5/5, dorsiflexion 3/5, generalized decreased sensation at right foot.    Neurological: He is alert and oriented to person, place, and time.   Skin: He is not diaphoretic.     Labs:  Recent Labs      03/16/19   1535  03/16/19   2315  03/17/19   0530  03/17/19   1220   WBC  18.4*   --   8.1 "   --    RBC  4.80   --   4.02*   --    HEMOGLOBIN  15.6  13.9*  13.0*  12.1*   HEMATOCRIT  43.5   --   38.0*   --    MCV  90.6   --   94.5   --    MCH  32.5   --   32.3   --    MCHC  35.9*   --   34.2   --    RDW  41.2   --   44.7   --    PLATELETCT  256   --   201   --    MPV  10.7   --   10.7   --      Recent Labs      03/16/19   1535  03/17/19   0530   SODIUM  140  140   POTASSIUM  3.9  4.0   CHLORIDE  106  107   CO2  24  25   GLUCOSE  144*  135*   BUN  16  13   CREATININE  1.31  0.98   CALCIUM  9.4  8.3*       Medical Decision Making/Problem List:    Active Hospital Problems    Diagnosis   • Closed fracture of acetabulum (HCC) [S32.409A]   • Closed fracture of lumbar vertebra (HCC) [S32.009A]   • Traumatic retroperitoneal hematoma [S36.892A]   • Contraindication to deep vein thrombosis (DVT) prophylaxis [Z53.09]   • Trauma [T14.90XA]     Core Measures & Quality Metrics:  Current DVT prophylaxis: SCDs  Discussed patient condition with RN, Patient and orthopedics.  Clearance for lovenox/heparin: per trauma team  Weight Bearing Status: NWB bed rest till post op  Wounds & Drains: leave pin dressings in place  Disposition and Follow-up: pending therapy recs

## 2019-03-17 NOTE — PROGRESS NOTES
"Pt received up to floor with day RN and CCT at 1915 from MRI. Received report from day RN. Assumed pt care. Pt's wife and brother in waiting room. VSS upon arrival.  Temp: 97.3 F, temporal  HR: 99  RR: 16  BP: 136/83  SpO2: 99 via 3 L NC  Weight: 68.5 kg  Height: 5'10\" per pt   Pt reports moderate pain to back and R hip. This RN confirms with trauma APN sips with meds is appropriate and administers PO oxy.   Discussed call light/phone system, communication board and POC. Pt is aao x 4 and verbalizes understanding. Pt mobility assessed. Pt has an immobilizer to RLE. Fall precautions in place. Bed is locked and in low position, call light within reach. Bed alarm on. Treaded slippers in place. Pt needs met at this time. Hourly rounding in place.  "

## 2019-03-17 NOTE — PROGRESS NOTES
2 RN skin check completed.    Patient has abrasion to left forearm. No concerns with breakdown at this time. Skeletal traction in place on right leg, pressing down on skin. Mepilex applied under metallic traction device to prevent breakdown. Q2 hour turns in place using pillows for support and positioning.

## 2019-03-17 NOTE — PROGRESS NOTES
2 RN skin check complete. Pt has an abrasion to L forearm, picture taken and uploaded. No areas of concern observed. All appropriate preventative measures in place.

## 2019-03-17 NOTE — CARE PLAN
Problem: Infection  Goal: Will remain free from infection  This RN educates pt regarding infection prevention and demonstrates proper hand hygiene. Pt verbalizes understanding. CHG bath provided on arrival. Oral care provided Q4 hours.    Problem: Venous Thromboembolism (VTW)/Deep Vein Thrombosis (DVT) Prevention:  Goal: Patient will participate in Venous Thrombosis (VTE)/Deep Vein Thrombosis (DVT)Prevention Measures  SCD placed to LLE. RLE to skeletal traction.    Problem: Pain Management  Goal: Pain level will decrease to patient's comfort goal  Pt reports pain to R hip and lower back. This RN administers PO oxy and IV morphine PRN per MAR.
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
## 2019-03-17 NOTE — PROGRESS NOTES
Right acetabular fracture and foot drop  Traction pin and closed reduction last night  Plan ORIF acetabulum tomorrow AM

## 2019-03-17 NOTE — PROGRESS NOTES
20 lb skeletal traction was applied to RLE.   For questions or adjustments contact traction at ext. [phone].

## 2019-03-17 NOTE — ASSESSMENT & PLAN NOTE
Abnormal L5 vertebrae right pedicle fracture, left lamina fracture, fracture of the left pars interarticularis, and likely transverse ligamentous component. Epidural hematoma extending from L3 to L5. Right L1-L5 transverse process fracture Left L3-L5 transverse process fracture  MRI without evidence of significant epidural hematoma  3/19 Right L4-5 hemilaminectomy, L4-S1 TLIF   TLSO off the self if OOB > 5 minutes or HOB >45 degress  Maulik Muñoz MD. Neurosurgery

## 2019-03-17 NOTE — ED NOTES
Verbalized minimal relief of pain.  Last meal at 2pm  Pt not diabetic  No hx of MRSA or any infection

## 2019-03-18 ENCOUNTER — APPOINTMENT (OUTPATIENT)
Dept: RADIOLOGY | Facility: MEDICAL CENTER | Age: 59
DRG: 958 | End: 2019-03-18
Attending: ORTHOPAEDIC SURGERY
Payer: COMMERCIAL

## 2019-03-18 ENCOUNTER — ANESTHESIA (OUTPATIENT)
Dept: SURGERY | Facility: MEDICAL CENTER | Age: 59
DRG: 958 | End: 2019-03-18
Payer: COMMERCIAL

## 2019-03-18 ENCOUNTER — ANESTHESIA EVENT (OUTPATIENT)
Dept: SURGERY | Facility: MEDICAL CENTER | Age: 59
DRG: 958 | End: 2019-03-18
Payer: COMMERCIAL

## 2019-03-18 ENCOUNTER — APPOINTMENT (OUTPATIENT)
Dept: RADIOLOGY | Facility: MEDICAL CENTER | Age: 59
DRG: 958 | End: 2019-03-18
Attending: NURSE PRACTITIONER
Payer: COMMERCIAL

## 2019-03-18 LAB
ANION GAP SERPL CALC-SCNC: 14 MMOL/L (ref 0–11.9)
BASOPHILS # BLD AUTO: 0.3 % (ref 0–1.8)
BASOPHILS # BLD: 0.03 K/UL (ref 0–0.12)
BUN SERPL-MCNC: 8 MG/DL (ref 8–22)
CALCIUM SERPL-MCNC: 7.4 MG/DL (ref 8.5–10.5)
CHLORIDE SERPL-SCNC: 107 MMOL/L (ref 96–112)
CO2 SERPL-SCNC: 13 MMOL/L (ref 20–33)
CREAT SERPL-MCNC: 0.78 MG/DL (ref 0.5–1.4)
EOSINOPHIL # BLD AUTO: 0.07 K/UL (ref 0–0.51)
EOSINOPHIL NFR BLD: 0.8 % (ref 0–6.9)
ERYTHROCYTE [DISTWIDTH] IN BLOOD BY AUTOMATED COUNT: 47.6 FL (ref 35.9–50)
GLUCOSE SERPL-MCNC: 109 MG/DL (ref 65–99)
HCT VFR BLD AUTO: 38.2 % (ref 42–52)
HGB BLD-MCNC: 12.6 G/DL (ref 14–18)
IMM GRANULOCYTES # BLD AUTO: 0.02 K/UL (ref 0–0.11)
IMM GRANULOCYTES NFR BLD AUTO: 0.2 % (ref 0–0.9)
LYMPHOCYTES # BLD AUTO: 1.03 K/UL (ref 1–4.8)
LYMPHOCYTES NFR BLD: 11.5 % (ref 22–41)
MCH RBC QN AUTO: 32.7 PG (ref 27–33)
MCHC RBC AUTO-ENTMCNC: 33 G/DL (ref 33.7–35.3)
MCV RBC AUTO: 99.2 FL (ref 81.4–97.8)
MONOCYTES # BLD AUTO: 0.78 K/UL (ref 0–0.85)
MONOCYTES NFR BLD AUTO: 8.7 % (ref 0–13.4)
NEUTROPHILS # BLD AUTO: 7.03 K/UL (ref 1.82–7.42)
NEUTROPHILS NFR BLD: 78.5 % (ref 44–72)
NRBC # BLD AUTO: 0 K/UL
NRBC BLD-RTO: 0 /100 WBC
PLATELET # BLD AUTO: 151 K/UL (ref 164–446)
PMV BLD AUTO: 10.7 FL (ref 9–12.9)
POTASSIUM SERPL-SCNC: 4.1 MMOL/L (ref 3.6–5.5)
RBC # BLD AUTO: 3.85 M/UL (ref 4.7–6.1)
SODIUM SERPL-SCNC: 134 MMOL/L (ref 135–145)
WBC # BLD AUTO: 9 K/UL (ref 4.8–10.8)

## 2019-03-18 PROCEDURE — 500423 HCHG DRESSING, ABD COMBINE: Performed by: ORTHOPAEDIC SURGERY

## 2019-03-18 PROCEDURE — 770022 HCHG ROOM/CARE - ICU (200)

## 2019-03-18 PROCEDURE — 2W5NX0Z REMOVAL OF TRACTION APPARATUS ON RIGHT UPPER LEG: ICD-10-PCS | Performed by: ORTHOPAEDIC SURGERY

## 2019-03-18 PROCEDURE — 160048 HCHG OR STATISTICAL LEVEL 1-5: Performed by: ORTHOPAEDIC SURGERY

## 2019-03-18 PROCEDURE — 700102 HCHG RX REV CODE 250 W/ 637 OVERRIDE(OP): Performed by: ANESTHESIOLOGY

## 2019-03-18 PROCEDURE — 700111 HCHG RX REV CODE 636 W/ 250 OVERRIDE (IP): Performed by: ANESTHESIOLOGY

## 2019-03-18 PROCEDURE — 160035 HCHG PACU - 1ST 60 MINS PHASE I: Performed by: ORTHOPAEDIC SURGERY

## 2019-03-18 PROCEDURE — A9270 NON-COVERED ITEM OR SERVICE: HCPCS | Performed by: NURSE PRACTITIONER

## 2019-03-18 PROCEDURE — 700112 HCHG RX REV CODE 229: Performed by: ORTHOPAEDIC SURGERY

## 2019-03-18 PROCEDURE — 501838 HCHG SUTURE GENERAL: Performed by: ORTHOPAEDIC SURGERY

## 2019-03-18 PROCEDURE — 72190 X-RAY EXAM OF PELVIS: CPT

## 2019-03-18 PROCEDURE — A9270 NON-COVERED ITEM OR SERVICE: HCPCS | Performed by: ANESTHESIOLOGY

## 2019-03-18 PROCEDURE — 700105 HCHG RX REV CODE 258: Performed by: ANESTHESIOLOGY

## 2019-03-18 PROCEDURE — A6223 GAUZE >16<=48 NO W/SAL W/O B: HCPCS | Performed by: ORTHOPAEDIC SURGERY

## 2019-03-18 PROCEDURE — A9270 NON-COVERED ITEM OR SERVICE: HCPCS | Performed by: ORTHOPAEDIC SURGERY

## 2019-03-18 PROCEDURE — 160009 HCHG ANES TIME/MIN: Performed by: ORTHOPAEDIC SURGERY

## 2019-03-18 PROCEDURE — 71045 X-RAY EXAM CHEST 1 VIEW: CPT | Performed by: RADIOLOGY

## 2019-03-18 PROCEDURE — 500440 HCHG DRESSING, STERILE ROLL (KERLIX): Performed by: ORTHOPAEDIC SURGERY

## 2019-03-18 PROCEDURE — 700102 HCHG RX REV CODE 250 W/ 637 OVERRIDE(OP): Performed by: ORTHOPAEDIC SURGERY

## 2019-03-18 PROCEDURE — 71045 X-RAY EXAM CHEST 1 VIEW: CPT

## 2019-03-18 PROCEDURE — 160002 HCHG RECOVERY MINUTES (STAT): Performed by: ORTHOPAEDIC SURGERY

## 2019-03-18 PROCEDURE — 500112 HCHG BONE WAX: Performed by: ORTHOPAEDIC SURGERY

## 2019-03-18 PROCEDURE — C1713 ANCHOR/SCREW BN/BN,TIS/BN: HCPCS | Performed by: ORTHOPAEDIC SURGERY

## 2019-03-18 PROCEDURE — 700111 HCHG RX REV CODE 636 W/ 250 OVERRIDE (IP): Performed by: ORTHOPAEDIC SURGERY

## 2019-03-18 PROCEDURE — 700112 HCHG RX REV CODE 229: Performed by: NURSE PRACTITIONER

## 2019-03-18 PROCEDURE — 700102 HCHG RX REV CODE 250 W/ 637 OVERRIDE(OP): Performed by: NURSE PRACTITIONER

## 2019-03-18 PROCEDURE — A4450 NON-WATERPROOF TAPE: HCPCS | Performed by: ORTHOPAEDIC SURGERY

## 2019-03-18 PROCEDURE — 85025 COMPLETE CBC W/AUTO DIFF WBC: CPT

## 2019-03-18 PROCEDURE — 160042 HCHG SURGERY MINUTES - EA ADDL 1 MIN LEVEL 5: Performed by: ORTHOPAEDIC SURGERY

## 2019-03-18 PROCEDURE — 700111 HCHG RX REV CODE 636 W/ 250 OVERRIDE (IP): Performed by: NURSE PRACTITIONER

## 2019-03-18 PROCEDURE — 99291 CRITICAL CARE FIRST HOUR: CPT | Performed by: SURGERY

## 2019-03-18 PROCEDURE — 160036 HCHG PACU - EA ADDL 30 MINS PHASE I: Performed by: ORTHOPAEDIC SURGERY

## 2019-03-18 PROCEDURE — 700101 HCHG RX REV CODE 250: Performed by: ANESTHESIOLOGY

## 2019-03-18 PROCEDURE — 160031 HCHG SURGERY MINUTES - 1ST 30 MINS LEVEL 5: Performed by: ORTHOPAEDIC SURGERY

## 2019-03-18 PROCEDURE — 500891 HCHG PACK, ORTHO MAJOR: Performed by: ORTHOPAEDIC SURGERY

## 2019-03-18 PROCEDURE — 80048 BASIC METABOLIC PNL TOTAL CA: CPT

## 2019-03-18 PROCEDURE — 0QS404Z REPOSITION RIGHT ACETABULUM WITH INTERNAL FIXATION DEVICE, OPEN APPROACH: ICD-10-PCS | Performed by: ORTHOPAEDIC SURGERY

## 2019-03-18 DEVICE — SCREW MPS CORT 3.5X34MM ST - (2TX6=12): Type: IMPLANTABLE DEVICE | Site: HIP | Status: FUNCTIONAL

## 2019-03-18 DEVICE — PLATE MPS STR 8-H - (2TX2=4): Type: IMPLANTABLE DEVICE | Site: HIP | Status: FUNCTIONAL

## 2019-03-18 DEVICE — SCREW MPS CORT 3.5X30MM ST - (2TX6=12): Type: IMPLANTABLE DEVICE | Site: HIP | Status: FUNCTIONAL

## 2019-03-18 DEVICE — SCREW MPS CORT 3.5X38MM ST - (2TX6=12): Type: IMPLANTABLE DEVICE | Site: HIP | Status: FUNCTIONAL

## 2019-03-18 DEVICE — SCREW MPS CORT 3.5X20MM ST - (2TX6=12): Type: IMPLANTABLE DEVICE | Site: HIP | Status: FUNCTIONAL

## 2019-03-18 DEVICE — SCREW MPS CORT 3.5X50MM ST - (2TX6=12): Type: IMPLANTABLE DEVICE | Site: HIP | Status: FUNCTIONAL

## 2019-03-18 DEVICE — SCREW MPS CORT 3.5X40MM ST - (2TX6=12): Type: IMPLANTABLE DEVICE | Site: HIP | Status: FUNCTIONAL

## 2019-03-18 RX ORDER — ACETAMINOPHEN 650 MG/1
SUPPOSITORY RECTAL
Status: ACTIVE
Start: 2019-03-18 | End: 2019-03-19

## 2019-03-18 RX ORDER — KETOROLAC TROMETHAMINE 30 MG/ML
INJECTION, SOLUTION INTRAMUSCULAR; INTRAVENOUS PRN
Status: DISCONTINUED | OUTPATIENT
Start: 2019-03-18 | End: 2019-03-18 | Stop reason: SURG

## 2019-03-18 RX ORDER — BISACODYL 10 MG
10 SUPPOSITORY, RECTAL RECTAL
Status: DISCONTINUED | OUTPATIENT
Start: 2019-03-18 | End: 2019-03-26 | Stop reason: HOSPADM

## 2019-03-18 RX ORDER — OXYCODONE HYDROCHLORIDE 5 MG/1
5 TABLET ORAL
Status: DISCONTINUED | OUTPATIENT
Start: 2019-03-18 | End: 2019-03-26 | Stop reason: HOSPADM

## 2019-03-18 RX ORDER — HYDROMORPHONE HYDROCHLORIDE 2 MG/ML
0.5 INJECTION, SOLUTION INTRAMUSCULAR; INTRAVENOUS; SUBCUTANEOUS
Status: DISCONTINUED | OUTPATIENT
Start: 2019-03-18 | End: 2019-03-19

## 2019-03-18 RX ORDER — HYDROMORPHONE HYDROCHLORIDE 2 MG/ML
0.4 INJECTION, SOLUTION INTRAMUSCULAR; INTRAVENOUS; SUBCUTANEOUS
Status: DISCONTINUED | OUTPATIENT
Start: 2019-03-18 | End: 2019-03-18 | Stop reason: HOSPADM

## 2019-03-18 RX ORDER — DOCUSATE SODIUM 100 MG/1
100 CAPSULE, LIQUID FILLED ORAL 2 TIMES DAILY
Status: DISCONTINUED | OUTPATIENT
Start: 2019-03-18 | End: 2019-03-26 | Stop reason: HOSPADM

## 2019-03-18 RX ORDER — AMOXICILLIN 250 MG
1 CAPSULE ORAL NIGHTLY
Status: DISCONTINUED | OUTPATIENT
Start: 2019-03-18 | End: 2019-03-26 | Stop reason: HOSPADM

## 2019-03-18 RX ORDER — DIPHENHYDRAMINE HYDROCHLORIDE 50 MG/ML
25 INJECTION INTRAMUSCULAR; INTRAVENOUS EVERY 6 HOURS PRN
Status: DISCONTINUED | OUTPATIENT
Start: 2019-03-18 | End: 2019-03-26 | Stop reason: HOSPADM

## 2019-03-18 RX ORDER — DIPHENHYDRAMINE HYDROCHLORIDE 50 MG/ML
12.5 INJECTION INTRAMUSCULAR; INTRAVENOUS
Status: DISCONTINUED | OUTPATIENT
Start: 2019-03-18 | End: 2019-03-18 | Stop reason: HOSPADM

## 2019-03-18 RX ORDER — ACETAMINOPHEN 325 MG/1
650 TABLET ORAL EVERY 6 HOURS
Status: DISPENSED | OUTPATIENT
Start: 2019-03-18 | End: 2019-03-23

## 2019-03-18 RX ORDER — ONDANSETRON 2 MG/ML
4 INJECTION INTRAMUSCULAR; INTRAVENOUS
Status: DISCONTINUED | OUTPATIENT
Start: 2019-03-18 | End: 2019-03-18 | Stop reason: HOSPADM

## 2019-03-18 RX ORDER — MAGNESIUM HYDROXIDE 1200 MG/15ML
LIQUID ORAL
Status: COMPLETED | OUTPATIENT
Start: 2019-03-18 | End: 2019-03-18

## 2019-03-18 RX ORDER — HYDROMORPHONE HYDROCHLORIDE 2 MG/ML
INJECTION, SOLUTION INTRAMUSCULAR; INTRAVENOUS; SUBCUTANEOUS PRN
Status: DISCONTINUED | OUTPATIENT
Start: 2019-03-18 | End: 2019-03-18 | Stop reason: SURG

## 2019-03-18 RX ORDER — HYDRALAZINE HYDROCHLORIDE 20 MG/ML
5 INJECTION INTRAMUSCULAR; INTRAVENOUS
Status: DISCONTINUED | OUTPATIENT
Start: 2019-03-18 | End: 2019-03-18 | Stop reason: HOSPADM

## 2019-03-18 RX ORDER — SCOLOPAMINE TRANSDERMAL SYSTEM 1 MG/1
1 PATCH, EXTENDED RELEASE TRANSDERMAL
Status: DISCONTINUED | OUTPATIENT
Start: 2019-03-18 | End: 2019-03-26 | Stop reason: HOSPADM

## 2019-03-18 RX ORDER — HALOPERIDOL 5 MG/ML
1 INJECTION INTRAMUSCULAR EVERY 6 HOURS PRN
Status: DISCONTINUED | OUTPATIENT
Start: 2019-03-18 | End: 2019-03-26 | Stop reason: HOSPADM

## 2019-03-18 RX ORDER — OXYCODONE HCL 5 MG/5 ML
5 SOLUTION, ORAL ORAL
Status: DISCONTINUED | OUTPATIENT
Start: 2019-03-18 | End: 2019-03-18 | Stop reason: HOSPADM

## 2019-03-18 RX ORDER — SODIUM CHLORIDE, SODIUM LACTATE, POTASSIUM CHLORIDE, CALCIUM CHLORIDE 600; 310; 30; 20 MG/100ML; MG/100ML; MG/100ML; MG/100ML
INJECTION, SOLUTION INTRAVENOUS
Status: DISCONTINUED | OUTPATIENT
Start: 2019-03-18 | End: 2019-03-18 | Stop reason: SURG

## 2019-03-18 RX ORDER — ACETAMINOPHEN 500 MG
1000 TABLET ORAL ONCE
Status: DISCONTINUED | OUTPATIENT
Start: 2019-03-18 | End: 2019-03-18 | Stop reason: HOSPADM

## 2019-03-18 RX ORDER — OXYCODONE HYDROCHLORIDE 10 MG/1
10 TABLET ORAL
Status: DISCONTINUED | OUTPATIENT
Start: 2019-03-18 | End: 2019-03-26 | Stop reason: HOSPADM

## 2019-03-18 RX ORDER — ENEMA 19; 7 G/133ML; G/133ML
1 ENEMA RECTAL
Status: DISCONTINUED | OUTPATIENT
Start: 2019-03-18 | End: 2019-03-26 | Stop reason: HOSPADM

## 2019-03-18 RX ORDER — MIDAZOLAM HYDROCHLORIDE 1 MG/ML
1 INJECTION INTRAMUSCULAR; INTRAVENOUS
Status: DISCONTINUED | OUTPATIENT
Start: 2019-03-18 | End: 2019-03-18 | Stop reason: HOSPADM

## 2019-03-18 RX ORDER — GABAPENTIN 300 MG/1
300 CAPSULE ORAL ONCE
Status: COMPLETED | OUTPATIENT
Start: 2019-03-18 | End: 2019-03-18

## 2019-03-18 RX ORDER — HALOPERIDOL 5 MG/ML
1 INJECTION INTRAMUSCULAR
Status: DISCONTINUED | OUTPATIENT
Start: 2019-03-18 | End: 2019-03-18 | Stop reason: HOSPADM

## 2019-03-18 RX ORDER — CEFAZOLIN SODIUM 1 G/3ML
INJECTION, POWDER, FOR SOLUTION INTRAMUSCULAR; INTRAVENOUS PRN
Status: DISCONTINUED | OUTPATIENT
Start: 2019-03-18 | End: 2019-03-18 | Stop reason: SURG

## 2019-03-18 RX ORDER — CEFAZOLIN SODIUM 2 G/100ML
2 INJECTION, SOLUTION INTRAVENOUS EVERY 8 HOURS
Status: COMPLETED | OUTPATIENT
Start: 2019-03-18 | End: 2019-03-19

## 2019-03-18 RX ORDER — OXYCODONE HCL 5 MG/5 ML
10 SOLUTION, ORAL ORAL
Status: DISCONTINUED | OUTPATIENT
Start: 2019-03-18 | End: 2019-03-18 | Stop reason: HOSPADM

## 2019-03-18 RX ORDER — HYDROMORPHONE HYDROCHLORIDE 2 MG/ML
0.1 INJECTION, SOLUTION INTRAMUSCULAR; INTRAVENOUS; SUBCUTANEOUS
Status: DISCONTINUED | OUTPATIENT
Start: 2019-03-18 | End: 2019-03-18 | Stop reason: HOSPADM

## 2019-03-18 RX ORDER — ONDANSETRON 2 MG/ML
4 INJECTION INTRAMUSCULAR; INTRAVENOUS EVERY 4 HOURS PRN
Status: DISCONTINUED | OUTPATIENT
Start: 2019-03-18 | End: 2019-03-26 | Stop reason: HOSPADM

## 2019-03-18 RX ORDER — ONDANSETRON 2 MG/ML
INJECTION INTRAMUSCULAR; INTRAVENOUS PRN
Status: DISCONTINUED | OUTPATIENT
Start: 2019-03-18 | End: 2019-03-18 | Stop reason: SURG

## 2019-03-18 RX ORDER — SODIUM CHLORIDE, SODIUM LACTATE, POTASSIUM CHLORIDE, CALCIUM CHLORIDE 600; 310; 30; 20 MG/100ML; MG/100ML; MG/100ML; MG/100ML
INJECTION, SOLUTION INTRAVENOUS CONTINUOUS
Status: DISCONTINUED | OUTPATIENT
Start: 2019-03-18 | End: 2019-03-18 | Stop reason: HOSPADM

## 2019-03-18 RX ORDER — MEPERIDINE HYDROCHLORIDE 25 MG/ML
12.5 INJECTION INTRAMUSCULAR; INTRAVENOUS; SUBCUTANEOUS
Status: DISCONTINUED | OUTPATIENT
Start: 2019-03-18 | End: 2019-03-18 | Stop reason: HOSPADM

## 2019-03-18 RX ORDER — POLYETHYLENE GLYCOL 3350 17 G/17G
1 POWDER, FOR SOLUTION ORAL 2 TIMES DAILY PRN
Status: DISCONTINUED | OUTPATIENT
Start: 2019-03-18 | End: 2019-03-26 | Stop reason: HOSPADM

## 2019-03-18 RX ORDER — HYDROMORPHONE HYDROCHLORIDE 2 MG/ML
0.2 INJECTION, SOLUTION INTRAMUSCULAR; INTRAVENOUS; SUBCUTANEOUS
Status: DISCONTINUED | OUTPATIENT
Start: 2019-03-18 | End: 2019-03-18 | Stop reason: HOSPADM

## 2019-03-18 RX ORDER — AMOXICILLIN 250 MG
1 CAPSULE ORAL
Status: DISCONTINUED | OUTPATIENT
Start: 2019-03-18 | End: 2019-03-26 | Stop reason: HOSPADM

## 2019-03-18 RX ORDER — METOPROLOL TARTRATE 1 MG/ML
1 INJECTION, SOLUTION INTRAVENOUS
Status: DISCONTINUED | OUTPATIENT
Start: 2019-03-18 | End: 2019-03-18 | Stop reason: HOSPADM

## 2019-03-18 RX ORDER — KETOROLAC TROMETHAMINE 30 MG/ML
30 INJECTION, SOLUTION INTRAMUSCULAR; INTRAVENOUS EVERY 6 HOURS
Status: DISCONTINUED | OUTPATIENT
Start: 2019-03-18 | End: 2019-03-18

## 2019-03-18 RX ORDER — DEXAMETHASONE SODIUM PHOSPHATE 4 MG/ML
4 INJECTION, SOLUTION INTRA-ARTICULAR; INTRALESIONAL; INTRAMUSCULAR; INTRAVENOUS; SOFT TISSUE
Status: DISCONTINUED | OUTPATIENT
Start: 2019-03-18 | End: 2019-03-26 | Stop reason: HOSPADM

## 2019-03-18 RX ADMIN — EPHEDRINE SULFATE 10 MG: 50 INJECTION INTRAMUSCULAR; INTRAVENOUS; SUBCUTANEOUS at 12:22

## 2019-03-18 RX ADMIN — MIDAZOLAM HYDROCHLORIDE 2 MG: 1 INJECTION, SOLUTION INTRAMUSCULAR; INTRAVENOUS at 11:18

## 2019-03-18 RX ADMIN — MORPHINE SULFATE 4 MG: 4 INJECTION INTRAVENOUS at 07:00

## 2019-03-18 RX ADMIN — OXYCODONE HYDROCHLORIDE 10 MG: 10 TABLET ORAL at 02:00

## 2019-03-18 RX ADMIN — SENNOSIDES AND DOCUSATE SODIUM 1 TABLET: 8.6; 5 TABLET ORAL at 20:41

## 2019-03-18 RX ADMIN — MORPHINE SULFATE 4 MG: 4 INJECTION INTRAVENOUS at 04:03

## 2019-03-18 RX ADMIN — ONDANSETRON 4 MG: 2 INJECTION INTRAMUSCULAR; INTRAVENOUS at 12:24

## 2019-03-18 RX ADMIN — MORPHINE SULFATE 4 MG: 4 INJECTION INTRAVENOUS at 10:03

## 2019-03-18 RX ADMIN — FAMOTIDINE 20 MG: 20 TABLET ORAL at 04:32

## 2019-03-18 RX ADMIN — ROCURONIUM BROMIDE 20 MG: 10 INJECTION, SOLUTION INTRAVENOUS at 12:01

## 2019-03-18 RX ADMIN — ACETAMINOPHEN 1000 MG: 500 TABLET ORAL at 00:10

## 2019-03-18 RX ADMIN — ROCURONIUM BROMIDE 50 MG: 10 INJECTION, SOLUTION INTRAVENOUS at 11:22

## 2019-03-18 RX ADMIN — FENTANYL CITRATE 50 MCG: 50 INJECTION, SOLUTION INTRAMUSCULAR; INTRAVENOUS at 11:19

## 2019-03-18 RX ADMIN — HYDROMORPHONE HYDROCHLORIDE 0.4 MG: 2 INJECTION, SOLUTION INTRAMUSCULAR; INTRAVENOUS; SUBCUTANEOUS at 12:10

## 2019-03-18 RX ADMIN — OXYCODONE HYDROCHLORIDE 5 MG: 5 TABLET ORAL at 20:41

## 2019-03-18 RX ADMIN — KETOROLAC TROMETHAMINE 30 MG: 30 INJECTION, SOLUTION INTRAMUSCULAR at 12:24

## 2019-03-18 RX ADMIN — OXYCODONE HYDROCHLORIDE 10 MG: 10 TABLET ORAL at 05:00

## 2019-03-18 RX ADMIN — SODIUM CHLORIDE, POTASSIUM CHLORIDE, SODIUM LACTATE AND CALCIUM CHLORIDE: 600; 310; 30; 20 INJECTION, SOLUTION INTRAVENOUS at 11:18

## 2019-03-18 RX ADMIN — PROPOFOL 150 MG: 10 INJECTION, EMULSION INTRAVENOUS at 11:22

## 2019-03-18 RX ADMIN — FENTANYL CITRATE 50 MCG: 50 INJECTION, SOLUTION INTRAMUSCULAR; INTRAVENOUS at 11:42

## 2019-03-18 RX ADMIN — OXYCODONE HYDROCHLORIDE 10 MG: 10 TABLET ORAL at 08:15

## 2019-03-18 RX ADMIN — LIDOCAINE HYDROCHLORIDE 100 MG: 20 INJECTION, SOLUTION INTRAVENOUS at 11:22

## 2019-03-18 RX ADMIN — SUGAMMADEX 200 MG: 100 INJECTION, SOLUTION INTRAVENOUS at 12:27

## 2019-03-18 RX ADMIN — DOCUSATE SODIUM 100 MG: 100 CAPSULE, LIQUID FILLED ORAL at 04:32

## 2019-03-18 RX ADMIN — CEFAZOLIN 2 G: 330 INJECTION, POWDER, FOR SOLUTION INTRAMUSCULAR; INTRAVENOUS at 11:36

## 2019-03-18 RX ADMIN — DOCUSATE SODIUM 100 MG: 100 CAPSULE, LIQUID FILLED ORAL at 17:39

## 2019-03-18 RX ADMIN — ACETAMINOPHEN 650 MG: 325 TABLET, FILM COATED ORAL at 17:39

## 2019-03-18 RX ADMIN — ACETAMINOPHEN 1000 MG: 500 TABLET ORAL at 04:32

## 2019-03-18 RX ADMIN — GABAPENTIN 300 MG: 300 CAPSULE ORAL at 10:57

## 2019-03-18 RX ADMIN — CEFAZOLIN SODIUM 2 G: 2 INJECTION, SOLUTION INTRAVENOUS at 20:30

## 2019-03-18 ASSESSMENT — PAIN SCALES - GENERAL: PAIN_LEVEL: 2

## 2019-03-18 NOTE — THERAPY
PT orders received and acknowledged. Pt to OR today for surgical fixation of acetabular fracture and awaiting surgery for L4-S1 instrumentation and fusion . Will complete PT eval post-op

## 2019-03-18 NOTE — PROGRESS NOTES
Pt refusing posterior/sacral skin check d/t severe pain with movement. Pt has a L forearm abrasion, dressing in place, skeletal traction to R femur, pin sites CDI. Q2 minimal turning in place Lside and supine d/t pain. This RN educates pt regarding the risk of skin breakdown and pt verbalizes understanding.

## 2019-03-18 NOTE — OR NURSING
VSS on 10L oxymask per protocol. Remained sleepy entire stay in PACU, fluids not given due to not safe while so sleepy. No meds given. SR/ST for entire stay. Would wake up when aroused, answered appropriately then immediately back to sleep. Dressing c/d/i. Wife updated on status. Report given to Opal DEUTSCH
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
## 2019-03-18 NOTE — ANESTHESIA TIME REPORT
Anesthesia Start and Stop Event Times     Date Time Event    3/18/2019 1118 Anesthesia Start     1251 Anesthesia Stop        Responsible Staff  03/18/19    Name Role Begin End    Kartik Ewing M.D. Anesth 1118 1251        Post op Diagnosis  Right acetabular fracture (HCC)    Premium Reason  Non-Premium      Exception Times    Start Time             End Time                     #                           Dr. Ayala

## 2019-03-18 NOTE — PROGRESS NOTES
0950 - Transport on the way to  patient to take to [facility].  1015 - Patient to [facility] with ACLS RN and ZOLL. Report given to Estrella MEYERS RN. All questions and needs answered. Family at bedside.

## 2019-03-18 NOTE — ANESTHESIA PREPROCEDURE EVALUATION
Relevant Problems   No relevant active problems       Physical Exam    Airway   Mallampati: II  TM distance: >3 FB  Neck ROM: full       Cardiovascular - normal exam  Rhythm: regular  Rate: normal  (-) murmur     Dental - normal exam         Pulmonary - normal exam  Breath sounds clear to auscultation     Abdominal    Neurological - normal exam                 Anesthesia Plan    ASA 2       Plan - general       Airway plan will be ETT        Induction: intravenous    Postoperative Plan: Postoperative administration of opioids is intended.    Pertinent diagnostic labs and testing reviewed    Informed Consent:    Anesthetic plan and risks discussed with patient.    Use of blood products discussed with: patient whom consented to blood products.

## 2019-03-18 NOTE — CARE PLAN
Problem: Infection  Goal: Will remain free from infection  This RN demonstrates appropriate hand hygiene. CHG bath provided. Q4 hour oral care in place. Nair care provided.    Problem: Bowel/Gastric:  Goal: Normal bowel function is maintained or improved  Scheduled bowel protocol administered. Pt states he does not need additional PRNs at this time.

## 2019-03-18 NOTE — ANESTHESIA PROCEDURE NOTES
Airway  Date/Time: 3/18/2019 11:24 AM  Performed by: ELIAS VANCE  Authorized by: ELIAS VANCE     Location:  OR  Urgency:  Elective  Indications for Airway Management:  Anesthesia  Spontaneous Ventilation: absent    Sedation Level:  Deep  Preoxygenated: Yes    Patient Position:  Sniffing  Mask Difficulty Assessment:  1 - vent by mask  Final Airway Type:  Endotracheal airway  Final Endotracheal Airway:  ETT  Cuffed: Yes    Technique Used for Successful ETT Placement:  Video laryngoscopy  Insertion Site:  Oral  Blade type: Glidescope.  Laryngoscope Blade/Videolaryngoscope Blade Size:  3  ETT Size (mm):  7.5  Measured from:  Teeth  ETT to Teeth (cm):  24  Placement Verified by: auscultation and capnometry    Cormack-Lehane Classification:  Grade I - full view of glottis  Number of Attempts at Approach:  1

## 2019-03-18 NOTE — CARE PLAN
Problem: Pain Management  Goal: Pain level will decrease to patient's comfort goal    Intervention: Follow pain managment plan developed in collaboration with patient and Interdisciplinary Team  Assessing pain Q2H and PRN.       Problem: Skin Integrity  Goal: Risk for impaired skin integrity will decrease    Intervention: Implement precautions to protect skin integrity in collaboration with the interdisciplinary team  Pt at risk for skin breakdown r/t bedrest and injury. Pt turned minimum q 2 hr while on bedrest, skin assessed over bony prominences, skin protectant applied, pillows placed. Assessed for efficacy of interventions frequently.

## 2019-03-18 NOTE — ANESTHESIA QCDR
2019 [facility] Clinical Data Registry (for Quality Improvement)     Postoperative nausea/vomiting risk protocol (Adult = 18 yrs and Pediatric 3-17 yrs)- (430 and 463)  General inhalation anesthetic (NOT TIVA) with PONV risk factors: Yes  Provision of anti-emetic therapy with at least 2 different classes of agents: Yes   Patient DID NOT receive anti-emetic therapy and reason is documented in Medical Record:  N/A    Multimodal Pain Management- (AQI59)  Patient undergoing Elective Surgery (i.e. Outpatient, or ASC, or Prescheduled Surgery prior to Hospital Admission): Yes  Use of Multimodal Pain Management, two or more drugs and/or interventions, NOT including systemic opioids: Yes   Exception: Documented allergy to multiple classes of analgesics:  N/A    PACU assessment of acute postoperative pain prior to Anesthesia Care End- Applies to Patients Age = 18- (ABG7)  Initial PACU pain score is which of the following: < 7/10  Patient unable to report pain score: N/A    Post-anesthetic transfer of care checklist/protocol to PACU/ICU- (426 and 427)  Upon conclusion of case, patient transferred to which of the following locations: PACU/Non-ICU  Use of transfer checklist/protocol: Yes  Exclusion: Service Performed in Patient Hospital Room (and thus did not require transfer): N/A    PACU Reintubation- (AQI31)  General anesthesia requiring endotracheal intubation (ETT) along with subsequent extubation in OR or PACU: Yes  Required reintubation in the PACU: No   Extubation was a planned trial documented in the medical record prior to removal of the original airway device:  N/A    Unplanned admission to ICU related to anesthesia service up through end of PACU care- (MD51)  Unplanned admission to ICU (not initially anticipated at anesthesia start time): No

## 2019-03-18 NOTE — ANESTHESIA POSTPROCEDURE EVALUATION
Patient: Baljinder Mendieta    Procedure Summary     Date:  03/18/19 Room / Location:  [place] [address] / SURGERY [facility]    Anesthesia Start:  1118 Anesthesia Stop:  1251    Procedure:  ORIF, FRACTURE, ACETABULUM (Right Hip) Diagnosis:  (right acetabular fracture )    Surgeon:  Omid Aldana M.D. Responsible Provider:  Kartik Ewing M.D.    Anesthesia Type:  general ASA Status:  2          Final Anesthesia Type: general  Last vitals  BP   NIBP: 130/75 (03/18/19 1248)    Temp   37.3 °C (99.2 °F) (03/18/19 1248)    Pulse   Pulse: (!) 108 (03/18/19 1248), Heart Rate (Monitored): (!) 107 (03/18/19 1248)   Resp   (!) 10 (03/18/19 1248)    SpO2   92 % (03/18/19 1248)      Anesthesia Post Evaluation    Patient location during evaluation: PACU  Patient participation: complete - patient participated  Level of consciousness: awake and alert  Pain score: 2    Airway patency: patent  Anesthetic complications: no  Cardiovascular status: hemodynamically stable  Respiratory status: acceptable  Hydration status: euvolemic    PONV: none
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
## 2019-03-18 NOTE — PROGRESS NOTES
Pre-surgery 2 RN skin assessment. No pressure areas of concern. Placed mepilex on sacrum.      Pt at risk for skin breakdown r/t bedrest and injury. Pt turned minimum q 2 hr while on bedrest, skin assessed over bony prominences, skin protectant applied, pillows placed. Assessed for efficacy of interventions frequently.

## 2019-03-19 ENCOUNTER — ANESTHESIA (OUTPATIENT)
Dept: SURGERY | Facility: MEDICAL CENTER | Age: 59
DRG: 958 | End: 2019-03-19
Payer: COMMERCIAL

## 2019-03-19 ENCOUNTER — APPOINTMENT (OUTPATIENT)
Dept: RADIOLOGY | Facility: MEDICAL CENTER | Age: 59
DRG: 958 | End: 2019-03-19
Attending: NEUROLOGICAL SURGERY
Payer: COMMERCIAL

## 2019-03-19 ENCOUNTER — APPOINTMENT (OUTPATIENT)
Dept: RADIOLOGY | Facility: MEDICAL CENTER | Age: 59
DRG: 958 | End: 2019-03-19
Attending: NURSE PRACTITIONER
Payer: COMMERCIAL

## 2019-03-19 LAB
ANION GAP SERPL CALC-SCNC: 8 MMOL/L (ref 0–11.9)
BASOPHILS # BLD AUTO: 0.4 % (ref 0–1.8)
BASOPHILS # BLD: 0.03 K/UL (ref 0–0.12)
BUN SERPL-MCNC: 7 MG/DL (ref 8–22)
CALCIUM SERPL-MCNC: 7.4 MG/DL (ref 8.5–10.5)
CHLORIDE SERPL-SCNC: 105 MMOL/L (ref 96–112)
CO2 SERPL-SCNC: 21 MMOL/L (ref 20–33)
CREAT SERPL-MCNC: 0.72 MG/DL (ref 0.5–1.4)
EOSINOPHIL # BLD AUTO: 0.02 K/UL (ref 0–0.51)
EOSINOPHIL NFR BLD: 0.3 % (ref 0–6.9)
ERYTHROCYTE [DISTWIDTH] IN BLOOD BY AUTOMATED COUNT: 44.8 FL (ref 35.9–50)
GLUCOSE SERPL-MCNC: 120 MG/DL (ref 65–99)
HCT VFR BLD AUTO: 28.4 % (ref 42–52)
HGB BLD-MCNC: 9.6 G/DL (ref 14–18)
IMM GRANULOCYTES # BLD AUTO: 0.05 K/UL (ref 0–0.11)
IMM GRANULOCYTES NFR BLD AUTO: 0.7 % (ref 0–0.9)
LYMPHOCYTES # BLD AUTO: 0.63 K/UL (ref 1–4.8)
LYMPHOCYTES NFR BLD: 9.3 % (ref 22–41)
MCH RBC QN AUTO: 32.5 PG (ref 27–33)
MCHC RBC AUTO-ENTMCNC: 33.8 G/DL (ref 33.7–35.3)
MCV RBC AUTO: 96.3 FL (ref 81.4–97.8)
MONOCYTES # BLD AUTO: 0.57 K/UL (ref 0–0.85)
MONOCYTES NFR BLD AUTO: 8.4 % (ref 0–13.4)
NEUTROPHILS # BLD AUTO: 5.46 K/UL (ref 1.82–7.42)
NEUTROPHILS NFR BLD: 80.9 % (ref 44–72)
NRBC # BLD AUTO: 0 K/UL
NRBC BLD-RTO: 0 /100 WBC
PLATELET # BLD AUTO: 140 K/UL (ref 164–446)
PMV BLD AUTO: 10.6 FL (ref 9–12.9)
POTASSIUM SERPL-SCNC: 3.6 MMOL/L (ref 3.6–5.5)
RBC # BLD AUTO: 2.95 M/UL (ref 4.7–6.1)
SODIUM SERPL-SCNC: 134 MMOL/L (ref 135–145)
WBC # BLD AUTO: 6.8 K/UL (ref 4.8–10.8)

## 2019-03-19 PROCEDURE — 700102 HCHG RX REV CODE 250 W/ 637 OVERRIDE(OP): Performed by: NURSE PRACTITIONER

## 2019-03-19 PROCEDURE — 500444 HCHG HEMOSTAT, SURGICEL 2X3: Performed by: NEUROLOGICAL SURGERY

## 2019-03-19 PROCEDURE — A9270 NON-COVERED ITEM OR SERVICE: HCPCS | Performed by: NURSE PRACTITIONER

## 2019-03-19 PROCEDURE — 95861 NEEDLE EMG 2 EXTREMITIES: CPT | Performed by: NEUROLOGICAL SURGERY

## 2019-03-19 PROCEDURE — 700102 HCHG RX REV CODE 250 W/ 637 OVERRIDE(OP): Performed by: ORTHOPAEDIC SURGERY

## 2019-03-19 PROCEDURE — 160036 HCHG PACU - EA ADDL 30 MINS PHASE I: Performed by: NEUROLOGICAL SURGERY

## 2019-03-19 PROCEDURE — 500885 HCHG PACK, JACKSON TABLE: Performed by: NEUROLOGICAL SURGERY

## 2019-03-19 PROCEDURE — 3E0U0GB INTRODUCTION OF RECOMBINANT BONE MORPHOGENETIC PROTEIN INTO JOINTS, OPEN APPROACH: ICD-10-PCS | Performed by: NEUROLOGICAL SURGERY

## 2019-03-19 PROCEDURE — 110371 HCHG SHELL REV 272: Performed by: NEUROLOGICAL SURGERY

## 2019-03-19 PROCEDURE — 700111 HCHG RX REV CODE 636 W/ 250 OVERRIDE (IP): Performed by: ORTHOPAEDIC SURGERY

## 2019-03-19 PROCEDURE — 4A11X4G MONITORING OF PERIPHERAL NERVOUS ELECTRICAL ACTIVITY, INTRAOPERATIVE, EXTERNAL APPROACH: ICD-10-PCS | Performed by: NEUROLOGICAL SURGERY

## 2019-03-19 PROCEDURE — 160031 HCHG SURGERY MINUTES - 1ST 30 MINS LEVEL 5: Performed by: NEUROLOGICAL SURGERY

## 2019-03-19 PROCEDURE — 99291 CRITICAL CARE FIRST HOUR: CPT | Performed by: SURGERY

## 2019-03-19 PROCEDURE — 700101 HCHG RX REV CODE 250: Performed by: ANESTHESIOLOGY

## 2019-03-19 PROCEDURE — 502000 HCHG MISC OR IMPLANTS RC 0278: Performed by: NEUROLOGICAL SURGERY

## 2019-03-19 PROCEDURE — 95909 NRV CNDJ TST 5-6 STUDIES: CPT | Performed by: NEUROLOGICAL SURGERY

## 2019-03-19 PROCEDURE — 700111 HCHG RX REV CODE 636 W/ 250 OVERRIDE (IP)

## 2019-03-19 PROCEDURE — 95940 IONM IN OPERATNG ROOM 15 MIN: CPT | Performed by: NEUROLOGICAL SURGERY

## 2019-03-19 PROCEDURE — 0SG30K1 FUSION OF LUMBOSACRAL JOINT WITH NONAUTOLOGOUS TISSUE SUBSTITUTE, POSTERIOR APPROACH, POSTERIOR COLUMN, OPEN APPROACH: ICD-10-PCS | Performed by: NEUROLOGICAL SURGERY

## 2019-03-19 PROCEDURE — 00CU0ZZ EXTIRPATION OF MATTER FROM SPINAL CANAL, OPEN APPROACH: ICD-10-PCS | Performed by: NEUROLOGICAL SURGERY

## 2019-03-19 PROCEDURE — 502240 HCHG MISC OR SUPPLY RC 0272: Performed by: NEUROLOGICAL SURGERY

## 2019-03-19 PROCEDURE — 700111 HCHG RX REV CODE 636 W/ 250 OVERRIDE (IP): Performed by: ANESTHESIOLOGY

## 2019-03-19 PROCEDURE — 500367 HCHG DRAIN KIT, HEMOVAC: Performed by: NEUROLOGICAL SURGERY

## 2019-03-19 PROCEDURE — A4314 CATH W/DRAINAGE 2-WAY LATEX: HCPCS | Performed by: NEUROLOGICAL SURGERY

## 2019-03-19 PROCEDURE — 500331 HCHG COTTONOID, SURG PATTIE: Performed by: NEUROLOGICAL SURGERY

## 2019-03-19 PROCEDURE — C1713 ANCHOR/SCREW BN/BN,TIS/BN: HCPCS | Performed by: NEUROLOGICAL SURGERY

## 2019-03-19 PROCEDURE — 700102 HCHG RX REV CODE 250 W/ 637 OVERRIDE(OP): Performed by: SURGERY

## 2019-03-19 PROCEDURE — 700105 HCHG RX REV CODE 258: Performed by: ANESTHESIOLOGY

## 2019-03-19 PROCEDURE — A9270 NON-COVERED ITEM OR SERVICE: HCPCS | Performed by: SURGERY

## 2019-03-19 PROCEDURE — 0SG00K1 FUSION OF LUMBAR VERTEBRAL JOINT WITH NONAUTOLOGOUS TISSUE SUBSTITUTE, POSTERIOR APPROACH, POSTERIOR COLUMN, OPEN APPROACH: ICD-10-PCS | Performed by: NEUROLOGICAL SURGERY

## 2019-03-19 PROCEDURE — 72100 X-RAY EXAM L-S SPINE 2/3 VWS: CPT

## 2019-03-19 PROCEDURE — L8699 PROSTHETIC IMPLANT NOS: HCPCS | Performed by: NEUROLOGICAL SURGERY

## 2019-03-19 PROCEDURE — 160009 HCHG ANES TIME/MIN: Performed by: NEUROLOGICAL SURGERY

## 2019-03-19 PROCEDURE — 770022 HCHG ROOM/CARE - ICU (200)

## 2019-03-19 PROCEDURE — 160002 HCHG RECOVERY MINUTES (STAT): Performed by: NEUROLOGICAL SURGERY

## 2019-03-19 PROCEDURE — 85025 COMPLETE CBC W/AUTO DIFF WBC: CPT

## 2019-03-19 PROCEDURE — 160048 HCHG OR STATISTICAL LEVEL 1-5: Performed by: NEUROLOGICAL SURGERY

## 2019-03-19 PROCEDURE — 80048 BASIC METABOLIC PNL TOTAL CA: CPT

## 2019-03-19 PROCEDURE — 700112 HCHG RX REV CODE 229: Performed by: ORTHOPAEDIC SURGERY

## 2019-03-19 PROCEDURE — A9270 NON-COVERED ITEM OR SERVICE: HCPCS | Performed by: ORTHOPAEDIC SURGERY

## 2019-03-19 PROCEDURE — 95937 NEUROMUSCULAR JUNCTION TEST: CPT | Performed by: NEUROLOGICAL SURGERY

## 2019-03-19 PROCEDURE — 160035 HCHG PACU - 1ST 60 MINS PHASE I: Performed by: NEUROLOGICAL SURGERY

## 2019-03-19 PROCEDURE — 501838 HCHG SUTURE GENERAL: Performed by: NEUROLOGICAL SURGERY

## 2019-03-19 PROCEDURE — 110454 HCHG SHELL REV 250: Performed by: NEUROLOGICAL SURGERY

## 2019-03-19 PROCEDURE — 160042 HCHG SURGERY MINUTES - EA ADDL 1 MIN LEVEL 5: Performed by: NEUROLOGICAL SURGERY

## 2019-03-19 PROCEDURE — 95925 SOMATOSENSORY TESTING: CPT | Performed by: NEUROLOGICAL SURGERY

## 2019-03-19 DEVICE — IMPLANTABLE DEVICE: Type: IMPLANTABLE DEVICE | Site: BACK | Status: FUNCTIONAL

## 2019-03-19 DEVICE — GRAPH BONE KIT INFUSE MEDIUM: Type: IMPLANTABLE DEVICE | Site: BACK | Status: FUNCTIONAL

## 2019-03-19 DEVICE — CAP LOCKING CREO THREADED (1EA): Type: IMPLANTABLE DEVICE | Site: BACK | Status: FUNCTIONAL

## 2019-03-19 RX ORDER — PHENYLEPHRINE HYDROCHLORIDE 10 MG/ML
INJECTION, SOLUTION INTRAMUSCULAR; INTRAVENOUS; SUBCUTANEOUS PRN
Status: DISCONTINUED | OUTPATIENT
Start: 2019-03-19 | End: 2019-03-19 | Stop reason: SURG

## 2019-03-19 RX ORDER — KETAMINE HYDROCHLORIDE 50 MG/ML
INJECTION, SOLUTION INTRAMUSCULAR; INTRAVENOUS PRN
Status: DISCONTINUED | OUTPATIENT
Start: 2019-03-19 | End: 2019-03-19 | Stop reason: SURG

## 2019-03-19 RX ORDER — HALOPERIDOL 5 MG/ML
1 INJECTION INTRAMUSCULAR
Status: DISCONTINUED | OUTPATIENT
Start: 2019-03-19 | End: 2019-03-19 | Stop reason: HOSPADM

## 2019-03-19 RX ORDER — MEPERIDINE HYDROCHLORIDE 25 MG/ML
6.25 INJECTION INTRAMUSCULAR; INTRAVENOUS; SUBCUTANEOUS
Status: DISCONTINUED | OUTPATIENT
Start: 2019-03-19 | End: 2019-03-19 | Stop reason: HOSPADM

## 2019-03-19 RX ORDER — CEFAZOLIN SODIUM 1 G/3ML
INJECTION, POWDER, FOR SOLUTION INTRAMUSCULAR; INTRAVENOUS PRN
Status: DISCONTINUED | OUTPATIENT
Start: 2019-03-19 | End: 2019-03-19 | Stop reason: SURG

## 2019-03-19 RX ORDER — HYDROMORPHONE HYDROCHLORIDE 2 MG/ML
0.2 INJECTION, SOLUTION INTRAMUSCULAR; INTRAVENOUS; SUBCUTANEOUS
Status: DISCONTINUED | OUTPATIENT
Start: 2019-03-19 | End: 2019-03-19 | Stop reason: HOSPADM

## 2019-03-19 RX ORDER — BACITRACIN 50000 [IU]/1
INJECTION, POWDER, FOR SOLUTION INTRAMUSCULAR
Status: DISCONTINUED | OUTPATIENT
Start: 2019-03-19 | End: 2019-03-19 | Stop reason: HOSPADM

## 2019-03-19 RX ORDER — SODIUM CHLORIDE, SODIUM LACTATE, POTASSIUM CHLORIDE, AND CALCIUM CHLORIDE .6; .31; .03; .02 G/100ML; G/100ML; G/100ML; G/100ML
IRRIGANT IRRIGATION
Status: DISCONTINUED | OUTPATIENT
Start: 2019-03-19 | End: 2019-03-19 | Stop reason: HOSPADM

## 2019-03-19 RX ORDER — SODIUM CHLORIDE, SODIUM LACTATE, POTASSIUM CHLORIDE, CALCIUM CHLORIDE 600; 310; 30; 20 MG/100ML; MG/100ML; MG/100ML; MG/100ML
INJECTION, SOLUTION INTRAVENOUS CONTINUOUS
Status: DISCONTINUED | OUTPATIENT
Start: 2019-03-19 | End: 2019-03-19 | Stop reason: HOSPADM

## 2019-03-19 RX ORDER — HYDROMORPHONE HYDROCHLORIDE 2 MG/ML
0.4 INJECTION, SOLUTION INTRAMUSCULAR; INTRAVENOUS; SUBCUTANEOUS
Status: DISCONTINUED | OUTPATIENT
Start: 2019-03-19 | End: 2019-03-19 | Stop reason: HOSPADM

## 2019-03-19 RX ORDER — HYDROMORPHONE HYDROCHLORIDE 2 MG/ML
INJECTION, SOLUTION INTRAMUSCULAR; INTRAVENOUS; SUBCUTANEOUS PRN
Status: DISCONTINUED | OUTPATIENT
Start: 2019-03-19 | End: 2019-03-19 | Stop reason: SURG

## 2019-03-19 RX ORDER — ONDANSETRON 2 MG/ML
INJECTION INTRAMUSCULAR; INTRAVENOUS PRN
Status: DISCONTINUED | OUTPATIENT
Start: 2019-03-19 | End: 2019-03-19 | Stop reason: SURG

## 2019-03-19 RX ORDER — HYDRALAZINE HYDROCHLORIDE 20 MG/ML
5 INJECTION INTRAMUSCULAR; INTRAVENOUS
Status: DISCONTINUED | OUTPATIENT
Start: 2019-03-19 | End: 2019-03-19 | Stop reason: HOSPADM

## 2019-03-19 RX ORDER — POTASSIUM CHLORIDE 20 MEQ/1
40 TABLET, EXTENDED RELEASE ORAL ONCE
Status: COMPLETED | OUTPATIENT
Start: 2019-03-19 | End: 2019-03-19

## 2019-03-19 RX ORDER — HYDROMORPHONE HYDROCHLORIDE 2 MG/ML
0.1 INJECTION, SOLUTION INTRAMUSCULAR; INTRAVENOUS; SUBCUTANEOUS
Status: DISCONTINUED | OUTPATIENT
Start: 2019-03-19 | End: 2019-03-19 | Stop reason: HOSPADM

## 2019-03-19 RX ORDER — ONDANSETRON 2 MG/ML
4 INJECTION INTRAMUSCULAR; INTRAVENOUS
Status: DISCONTINUED | OUTPATIENT
Start: 2019-03-19 | End: 2019-03-19 | Stop reason: HOSPADM

## 2019-03-19 RX ORDER — OXYCODONE HCL 5 MG/5 ML
10 SOLUTION, ORAL ORAL
Status: DISCONTINUED | OUTPATIENT
Start: 2019-03-19 | End: 2019-03-19 | Stop reason: HOSPADM

## 2019-03-19 RX ORDER — OXYCODONE HCL 5 MG/5 ML
5 SOLUTION, ORAL ORAL
Status: DISCONTINUED | OUTPATIENT
Start: 2019-03-19 | End: 2019-03-19 | Stop reason: HOSPADM

## 2019-03-19 RX ORDER — SODIUM CHLORIDE, SODIUM LACTATE, POTASSIUM CHLORIDE, CALCIUM CHLORIDE 600; 310; 30; 20 MG/100ML; MG/100ML; MG/100ML; MG/100ML
INJECTION, SOLUTION INTRAVENOUS
Status: DISCONTINUED | OUTPATIENT
Start: 2019-03-19 | End: 2019-03-19 | Stop reason: SURG

## 2019-03-19 RX ORDER — DEXAMETHASONE SODIUM PHOSPHATE 4 MG/ML
INJECTION, SOLUTION INTRA-ARTICULAR; INTRALESIONAL; INTRAMUSCULAR; INTRAVENOUS; SOFT TISSUE PRN
Status: DISCONTINUED | OUTPATIENT
Start: 2019-03-19 | End: 2019-03-19 | Stop reason: SURG

## 2019-03-19 RX ORDER — BUPIVACAINE HYDROCHLORIDE AND EPINEPHRINE 5; 5 MG/ML; UG/ML
INJECTION, SOLUTION EPIDURAL; INTRACAUDAL; PERINEURAL
Status: DISCONTINUED | OUTPATIENT
Start: 2019-03-19 | End: 2019-03-19 | Stop reason: HOSPADM

## 2019-03-19 RX ORDER — DIPHENHYDRAMINE HYDROCHLORIDE 50 MG/ML
12.5 INJECTION INTRAMUSCULAR; INTRAVENOUS
Status: DISCONTINUED | OUTPATIENT
Start: 2019-03-19 | End: 2019-03-19 | Stop reason: HOSPADM

## 2019-03-19 RX ORDER — GABAPENTIN 300 MG/1
300 CAPSULE ORAL
Status: ACTIVE | OUTPATIENT
Start: 2019-03-19 | End: 2019-03-20

## 2019-03-19 RX ORDER — ACETAMINOPHEN 500 MG
1000 TABLET ORAL ONCE
Status: ACTIVE | OUTPATIENT
Start: 2019-03-19 | End: 2019-03-20

## 2019-03-19 RX ADMIN — SUGAMMADEX 200 MG: 100 INJECTION, SOLUTION INTRAVENOUS at 13:20

## 2019-03-19 RX ADMIN — KETAMINE HYDROCHLORIDE 50 MG: 50 INJECTION, SOLUTION, CONCENTRATE INTRAMUSCULAR; INTRAVENOUS at 13:09

## 2019-03-19 RX ADMIN — OXYCODONE HYDROCHLORIDE 5 MG: 5 TABLET ORAL at 16:50

## 2019-03-19 RX ADMIN — HYDROMORPHONE HYDROCHLORIDE 0.5 MG: 2 INJECTION, SOLUTION INTRAMUSCULAR; INTRAVENOUS; SUBCUTANEOUS at 14:20

## 2019-03-19 RX ADMIN — PROPOFOL 200 MG: 10 INJECTION, EMULSION INTRAVENOUS at 13:06

## 2019-03-19 RX ADMIN — LIDOCAINE HYDROCHLORIDE 100 MG: 20 INJECTION, SOLUTION INTRAVENOUS at 13:06

## 2019-03-19 RX ADMIN — SENNOSIDES AND DOCUSATE SODIUM 1 TABLET: 8.6; 5 TABLET ORAL at 21:05

## 2019-03-19 RX ADMIN — ACETAMINOPHEN 650 MG: 325 TABLET, FILM COATED ORAL at 11:17

## 2019-03-19 RX ADMIN — OXYCODONE HYDROCHLORIDE 5 MG: 5 TABLET ORAL at 06:45

## 2019-03-19 RX ADMIN — CEFAZOLIN 2 G: 330 INJECTION, POWDER, FOR SOLUTION INTRAMUSCULAR; INTRAVENOUS at 13:20

## 2019-03-19 RX ADMIN — SODIUM CHLORIDE, POTASSIUM CHLORIDE, SODIUM LACTATE AND CALCIUM CHLORIDE: 600; 310; 30; 20 INJECTION, SOLUTION INTRAVENOUS at 14:35

## 2019-03-19 RX ADMIN — PROPOFOL 140 MCG/KG/MIN: 10 INJECTION, EMULSION INTRAVENOUS at 13:05

## 2019-03-19 RX ADMIN — SODIUM CHLORIDE, POTASSIUM CHLORIDE, SODIUM LACTATE AND CALCIUM CHLORIDE: 600; 310; 30; 20 INJECTION, SOLUTION INTRAVENOUS at 13:01

## 2019-03-19 RX ADMIN — ONDANSETRON 4 MG: 2 INJECTION INTRAMUSCULAR; INTRAVENOUS at 14:35

## 2019-03-19 RX ADMIN — OXYCODONE HYDROCHLORIDE 5 MG: 5 TABLET ORAL at 21:30

## 2019-03-19 RX ADMIN — FAMOTIDINE 20 MG: 20 TABLET ORAL at 06:42

## 2019-03-19 RX ADMIN — OXYCODONE HYDROCHLORIDE 5 MG: 5 TABLET ORAL at 00:10

## 2019-03-19 RX ADMIN — HYDROMORPHONE HYDROCHLORIDE 0.5 MG: 2 INJECTION, SOLUTION INTRAMUSCULAR; INTRAVENOUS; SUBCUTANEOUS at 13:05

## 2019-03-19 RX ADMIN — MIDAZOLAM HYDROCHLORIDE 2 MG: 1 INJECTION, SOLUTION INTRAMUSCULAR; INTRAVENOUS at 13:01

## 2019-03-19 RX ADMIN — DOCUSATE SODIUM 100 MG: 100 CAPSULE, LIQUID FILLED ORAL at 06:42

## 2019-03-19 RX ADMIN — PHENYLEPHRINE HYDROCHLORIDE 100 MCG: 10 INJECTION INTRAVENOUS at 13:35

## 2019-03-19 RX ADMIN — CEFAZOLIN SODIUM 2 G: 2 INJECTION, SOLUTION INTRAVENOUS at 03:32

## 2019-03-19 RX ADMIN — OXYCODONE HYDROCHLORIDE 5 MG: 5 TABLET ORAL at 03:10

## 2019-03-19 RX ADMIN — PHENYLEPHRINE HYDROCHLORIDE 100 MCG: 10 INJECTION INTRAVENOUS at 13:56

## 2019-03-19 RX ADMIN — ACETAMINOPHEN 650 MG: 325 TABLET, FILM COATED ORAL at 16:50

## 2019-03-19 RX ADMIN — FENTANYL CITRATE 50 MCG: 50 INJECTION, SOLUTION INTRAMUSCULAR; INTRAVENOUS at 14:45

## 2019-03-19 RX ADMIN — FENTANYL CITRATE 50 MCG: 50 INJECTION, SOLUTION INTRAMUSCULAR; INTRAVENOUS at 14:35

## 2019-03-19 RX ADMIN — ROCURONIUM BROMIDE 40 MG: 10 INJECTION, SOLUTION INTRAVENOUS at 13:06

## 2019-03-19 RX ADMIN — DEXAMETHASONE SODIUM PHOSPHATE 10 MG: 4 INJECTION, SOLUTION INTRAMUSCULAR; INTRAVENOUS at 13:13

## 2019-03-19 RX ADMIN — DOCUSATE SODIUM 100 MG: 100 CAPSULE, LIQUID FILLED ORAL at 16:51

## 2019-03-19 RX ADMIN — ACETAMINOPHEN 650 MG: 325 TABLET, FILM COATED ORAL at 06:45

## 2019-03-19 RX ADMIN — POTASSIUM CHLORIDE 40 MEQ: 1500 TABLET, EXTENDED RELEASE ORAL at 16:51

## 2019-03-19 RX ADMIN — ACETAMINOPHEN 650 MG: 325 TABLET, FILM COATED ORAL at 00:31

## 2019-03-19 ASSESSMENT — ENCOUNTER SYMPTOMS
CARDIOVASCULAR NEGATIVE: 1
CONSTITUTIONAL NEGATIVE: 1
RESPIRATORY NEGATIVE: 1

## 2019-03-19 ASSESSMENT — PAIN SCALES - WONG BAKER: WONGBAKER_NUMERICALRESPONSE: DOESN'T HURT AT ALL

## 2019-03-19 NOTE — CARE PLAN
Problem: Bowel/Gastric:  Goal: Normal bowel function is maintained or improved  Scheduled bowel protocol administered. Pt attempts to use bedpan and is unsuccessful.    Problem: Pain Management  Goal: Pain level will decrease to patient's comfort goal  Pt reports his pain level has improved since yesterday. Pt still reporting mild-moderate low back and hip pain. This RN administers scheduled PO tylenol and PO oxy PRN per MAR.
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
## 2019-03-19 NOTE — PROGRESS NOTES
POD#1  S/P ORIF right acetabulum  TTWB x 10 weeks  24 hrs post op ABX  PT/OT when able  Pain control  SCDs  Equinus boot for foot drop
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
## 2019-03-19 NOTE — ANESTHESIA PREPROCEDURE EVALUATION
Relevant Problems   (+) Closed fracture of acetabulum (HCC)   (+) Closed fracture of lumbar vertebra (HCC)   (+) Contraindication to deep vein thrombosis (DVT) prophylaxis   (+) Trauma   (+) Traumatic retroperitoneal hematoma      Within Normal Limits   ANESTHESIA     Past Medical History:   Diagnosis Date   • Ankylosing spondylitis (HCC)     reported per pt   • Arthritis    • Back pain        Physical Exam    Airway   Mallampati: II  TM distance: <3 FB  Neck ROM: full       Cardiovascular - normal exam  Rhythm: regular  Rate: normal  (-) murmur     Dental - normal exam         Pulmonary - normal exam  Breath sounds clear to auscultation     Abdominal    Neurological - abnormal exam    Comments: LE weakness             Anesthesia Plan    ASA 3   ASA physical status 3 criteria: other (comment)    Plan - general       Airway plan will be ETT        Induction: intravenous    Postoperative Plan: Postoperative administration of opioids is intended.    Pertinent diagnostic labs and testing reviewed    Informed Consent:    Anesthetic plan and risks discussed with patient.    Use of blood products discussed with: patient whom consented to blood products.
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
## 2019-03-19 NOTE — ANESTHESIA PROCEDURE NOTES
Airway  Date/Time: 3/19/2019 1:06 PM  Performed by: DENNIS TENORIO  Authorized by: DENNIS TENORIO     Location:  OR  Urgency:  Elective  Indications for Airway Management:  Anesthesia  Spontaneous Ventilation: absent    Sedation Level:  Deep  Preoxygenated: Yes    Patient Position:  Sniffing  Mask Difficulty Assessment:  0 - not attempted  Final Airway Type:  Endotracheal airway  Final Endotracheal Airway:  ETT  Cuffed: Yes    Technique Used for Successful ETT Placement:  Direct laryngoscopy  Devices/Methods Used in Placement:  Cricoid pressure  Insertion Site:  Oral  Blade Type:  Josh  Laryngoscope Blade/Videolaryngoscope Blade Size:  3  ETT Size (mm):  8.0  Measured from:  Teeth  ETT to Teeth (cm):  22  Placement Verified by: auscultation and capnometry    Cormack-Lehane Classification:  Grade IIa - partial view of glottis  Number of Attempts at Approach:  1

## 2019-03-19 NOTE — PROGRESS NOTES
Pt returned to S109. 2 RN skin check completed. No new areas of concern. Appropriate barriers in place

## 2019-03-19 NOTE — PROGRESS NOTES
Neurosurgery Progress Note    Subjective:  C/o of right pelvic/thigh pain, c/o back pain    Exam:  RLE: iliopsoas not test, quad 2/5, DF to 1/2 neutoral 4-/5, PF 4/5, EHL 3/5  LLE: iliopsoas pain with flexion, can hold at 4/5, quad 4 to 4+/5 PF/DF   Sensation to feet stable: chronic mild numbness right foot per patient    Pulse  Av.9  Min: 85  Max: 108  Resp  Avg: 15.6  Min: 10  Max: 33  Temp  Av.3 °C (99.2 °F)  Min: 36.7 °C (98.1 °F)  Max: 38.1 °C (100.5 °F)  Monitored Temp 2  Av.6 °C (99.7 °F)  Min: 37 °C (98.6 °F)  Max: 38.2 °C (100.8 °F)  SpO2  Av %  Min: 92 %  Max: 100 %    No Data Recorded    Recent Labs      19   0530  19   1220  19   0415  19   0525   WBC  8.1   --   9.0  6.8   RBC  4.02*   --   3.85*  2.95*   HEMOGLOBIN  13.0*  12.1*  12.6*  9.6*   HEMATOCRIT  38.0*   --   38.2*  28.4*   MCV  94.5   --   99.2*  96.3   MCH  32.3   --   32.7  32.5   MCHC  34.2   --   33.0*  33.8   RDW  44.7   --   47.6  44.8   PLATELETCT  201   --   151*  140*   MPV  10.7   --   10.7  10.6     Recent Labs      19   0530  19   0415  19   0525   SODIUM  140  134*  134*   POTASSIUM  4.0  4.1  3.6   CHLORIDE  107  107  105   CO2  25  13*  21   GLUCOSE  135*  109*  120*   BUN  13  8  7*   CREATININE  0.98  0.78  0.72   CALCIUM  8.3*  7.4*  7.4*     Recent Labs      19   1220   APTT  29.9   INR  1.13     Recent Labs      19   2315   REACTMIN  2.9*   CLOTKINET  1.5   CLOTANGL  72.2*   MAXCLOTS  66.3   PRCINADP  25.2   PRCINAA  18.0       Intake/Output       19 07 - 1959 19 - 1959      1900-0659 Total  Total       Intake    P.O.  --  480 480  --  -- --    P.O. -- 480 480 -- -- --    I.V.  2200  1500 3700  --  -- --    Volume (mL) (NS infusion) 1500 1500 3000 -- -- --    Volume (mL) (lactated ringers infusion) 700 -- 700 -- -- --    IV Piggyback  --  240 240  --  -- --    Volume (mL) (ceFAZolin in  dextrose (ANCEF) IVPB premix 2 g) -- 240 240 -- -- --    Total Intake 2200 2220 4420 -- -- --       Output    Urine  500  690 1190  --  -- --    Output (mL) (Urethral Catheter Temperature probe 16 Fr.)  -- -- --    Blood  100  -- 100  --  -- --    Est. Blood Loss 100 -- 100 -- -- --    Total Output  -- -- --       Net I/O     1600 1530 3130 -- -- --            Intake/Output Summary (Last 24 hours) at 03/19/19 0905  Last data filed at 03/19/19 0600   Gross per 24 hour   Intake             4420 ml   Output             1190 ml   Net             3230 ml            • Pharmacy Consult Request  1 Each PHARMACY TO DOSE   • ondansetron  4 mg Q4HRS PRN   • dexamethasone  4 mg Once PRN   • diphenhydrAMINE  25 mg Q6HRS PRN   • haloperidol lactate  1 mg Q6HRS PRN   • scopolamine  1 Patch Q72HRS PRN   • docusate sodium  100 mg BID   • senna-docusate  1 Tab Nightly   • senna-docusate  1 Tab Q24HRS PRN   • polyethylene glycol/lytes  1 Packet BID PRN   • magnesium hydroxide  30 mL QDAY PRN   • bisacodyl  10 mg Q24HRS PRN   • fleet  1 Each Once PRN   • acetaminophen  650 mg Q6HRS   • oxyCODONE immediate-release  5 mg Q3HRS PRN   • oxyCODONE immediate release  10 mg Q3HRS PRN   • HYDROmorphone  0.5 mg Q3HRS PRN   • Respiratory Care per Protocol   Continuous RT   • NS   Continuous   • morphine injection  4 mg Q3HRS PRN   • famotidine  20 mg BID    Or   • famotidine  20 mg BID   • acetaminophen  650 mg Q4HRS PRN    Or   • acetaminophen  650 mg Q4HRS PRN       Assessment and Plan:  Hospital day # 4   POD #1 right ORIF acetabulum, TTWB x 2 months    Lumbar spine fracture: plan forL4-S1 ALIF + PLATE, L4-S1 LAMINECTOMY, INSTRUMENTATION AND FUSION today      Prophylactic anticoagulation: no         Start date/time: tbd       Anemia/retroperitoneal bleed/post op ortho surgery: HgB  96, 12.6 preop ortho surgery, admit 13.9,     D/w Dr. Muñoz

## 2019-03-19 NOTE — CARE PLAN
Problem: Safety  Goal: Will remain free from falls  Outcome: PROGRESSING AS EXPECTED  Bed in low position with bed alarm on. Call light within reach. Lower bed rails in place. Treaded socks in use. Pt near nurses station.       Problem: Skin Integrity  Goal: Risk for impaired skin integrity will decrease  Outcome: PROGRESSING AS EXPECTED  Skin assessment complete. Appropriate barriers in place. Extra pillows in place for Q2hr logroll turns, floating heels and padding bony prominences.

## 2019-03-19 NOTE — ANESTHESIA QCDR
2019 [facility] Clinical Data Registry (for Quality Improvement)     Postoperative nausea/vomiting risk protocol (Adult = 18 yrs and Pediatric 3-17 yrs)- (430 and 463)  General inhalation anesthetic (NOT TIVA) with PONV risk factors: No  Provision of anti-emetic therapy with at least 2 different classes of agents: N/A  Patient DID NOT receive anti-emetic therapy and reason is documented in Medical Record: N/A    Multimodal Pain Management- (AQI59)  Patient undergoing Elective Surgery (i.e. Outpatient, or ASC, or Prescheduled Surgery prior to Hospital Admission): Yes  Use of Multimodal Pain Management, two or more drugs and/or interventions, NOT including systemic opioids: Yes   Exception: Documented allergy to multiple classes of analgesics:  N/A    PACU assessment of acute postoperative pain prior to Anesthesia Care End- Applies to Patients Age = 18- (ABG7)  Initial PACU pain score is which of the following: < 7/10  Patient unable to report pain score: N/A    Post-anesthetic transfer of care checklist/protocol to PACU/ICU- (426 and 427)  Upon conclusion of case, patient transferred to which of the following locations: PACU/Non-ICU  Use of transfer checklist/protocol: Yes  Exclusion: Service Performed in Patient Hospital Room (and thus did not require transfer): N/A    PACU Reintubation- (AQI31)  General anesthesia requiring endotracheal intubation (ETT) along with subsequent extubation in OR or PACU: No  Required reintubation in the PACU: N/A  Extubation was a planned trial documented in the medical record prior to removal of the original airway device: N/A    Unplanned admission to ICU related to anesthesia service up through end of PACU care- (MD51)  Unplanned admission to ICU (not initially anticipated at anesthesia start time): No
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
## 2019-03-19 NOTE — ANESTHESIA POSTPROCEDURE EVALUATION
Patient: Baljindre Mendieta    Procedure Summary     Date:  03/19/19 Room / Location:  [facility] OR 04 / SURGERY [facility]    Anesthesia Start:  1301 Anesthesia Stop:  1503    Procedures:       FUSION, SPINE, LUMBAR, PLIF-  L4-S1 (Back)      LAMINECTOMY, SPINE, LUMBAR, WITH DISCECTOMY-  L4-S1 LAMI POSTERIOR (Back) Diagnosis:       Lumbar vertebral fracture (HCC)      (lumbar 4-5 fracture )    Surgeon:  Maulik Muñoz M.D. Responsible Provider:  Ricky Kincaid M.D.    Anesthesia Type:  general ASA Status:  3          Final Anesthesia Type: general  Last vitals  BP   Blood Pressure: 160/74, NIBP: 133/76    Temp   37 °C (98.6 °F)    Pulse   Pulse: 93, Heart Rate (Monitored): 91   Resp   (!) 10    SpO2   96 %      Anesthesia Post Evaluation     Nurse Pain Score: 0  (Craig-Baker Scale)

## 2019-03-19 NOTE — ANESTHESIA TIME REPORT
Anesthesia Start and Stop Event Times     Date Time Event    3/19/2019 1301 Anesthesia Start     1503 Anesthesia Stop        Responsible Staff  03/19/19    Name Role Begin End    Ricky Kincaid M.D. Anesth 1301 1503        Post op Diagnosis  Lumbar vertebral fracture (HCC)    Premium Reason  Non-Premium      Exception Times    Start Time             End Time                     #                         Name                                                Comments:

## 2019-03-19 NOTE — ANESTHESIA PROCEDURE NOTES
Peripheral IV  Date/Time: 3/19/2019 1:25 PM  Performed by: DENNIS TENORIO  Authorized by: DENNIS TENORIO     Size:  18 G  Laterality:  Right  Site Prep:  Alcohol  Technique:  Direct puncture  Attempts:  1

## 2019-03-20 ENCOUNTER — APPOINTMENT (OUTPATIENT)
Dept: RADIOLOGY | Facility: MEDICAL CENTER | Age: 59
DRG: 958 | End: 2019-03-20
Attending: NURSE PRACTITIONER
Payer: COMMERCIAL

## 2019-03-20 PROBLEM — Z02.9 DISCHARGE PLANNING ISSUES: Status: ACTIVE | Noted: 2019-03-20

## 2019-03-20 PROBLEM — D50.9 IRON DEFICIENCY ANEMIA: Status: ACTIVE | Noted: 2019-03-20

## 2019-03-20 LAB
ABO GROUP BLD: NORMAL
ANION GAP SERPL CALC-SCNC: 8 MMOL/L (ref 0–11.9)
BASOPHILS # BLD AUTO: 0.1 % (ref 0–1.8)
BASOPHILS # BLD: 0.01 K/UL (ref 0–0.12)
BLD GP AB SCN SERPL QL: NORMAL
BUN SERPL-MCNC: 9 MG/DL (ref 8–22)
CALCIUM SERPL-MCNC: 7.6 MG/DL (ref 8.5–10.5)
CHLORIDE SERPL-SCNC: 105 MMOL/L (ref 96–112)
CO2 SERPL-SCNC: 22 MMOL/L (ref 20–33)
CREAT SERPL-MCNC: 0.65 MG/DL (ref 0.5–1.4)
EOSINOPHIL # BLD AUTO: 0.01 K/UL (ref 0–0.51)
EOSINOPHIL NFR BLD: 0.1 % (ref 0–6.9)
ERYTHROCYTE [DISTWIDTH] IN BLOOD BY AUTOMATED COUNT: 41.1 FL (ref 35.9–50)
GLUCOSE SERPL-MCNC: 116 MG/DL (ref 65–99)
HCT VFR BLD AUTO: 25.2 % (ref 42–52)
HGB BLD-MCNC: 9 G/DL (ref 14–18)
HGB RETIC QN AUTO: 31.9 PG/CELL (ref 29–35)
IMM GRANULOCYTES # BLD AUTO: 0.02 K/UL (ref 0–0.11)
IMM GRANULOCYTES NFR BLD AUTO: 0.3 % (ref 0–0.9)
IMM RETICS NFR: 11.4 % (ref 9.3–17.4)
IRON SATN MFR SERPL: 10 % (ref 15–55)
IRON SERPL-MCNC: 16 UG/DL (ref 50–180)
LYMPHOCYTES # BLD AUTO: 0.61 K/UL (ref 1–4.8)
LYMPHOCYTES NFR BLD: 8.2 % (ref 22–41)
MCH RBC QN AUTO: 33 PG (ref 27–33)
MCHC RBC AUTO-ENTMCNC: 35.7 G/DL (ref 33.7–35.3)
MCV RBC AUTO: 92.3 FL (ref 81.4–97.8)
MONOCYTES # BLD AUTO: 0.85 K/UL (ref 0–0.85)
MONOCYTES NFR BLD AUTO: 11.5 % (ref 0–13.4)
NEUTROPHILS # BLD AUTO: 5.91 K/UL (ref 1.82–7.42)
NEUTROPHILS NFR BLD: 79.8 % (ref 44–72)
NRBC # BLD AUTO: 0 K/UL
NRBC BLD-RTO: 0 /100 WBC
PLATELET # BLD AUTO: 203 K/UL (ref 164–446)
PMV BLD AUTO: 10.5 FL (ref 9–12.9)
POTASSIUM SERPL-SCNC: 4.3 MMOL/L (ref 3.6–5.5)
RBC # BLD AUTO: 2.73 M/UL (ref 4.7–6.1)
RETICS # AUTO: 0.1 M/UL (ref 0.04–0.06)
RETICS/RBC NFR: 3.8 % (ref 0.8–2.1)
RH BLD: NORMAL
SODIUM SERPL-SCNC: 135 MMOL/L (ref 135–145)
TIBC SERPL-MCNC: 168 UG/DL (ref 250–450)
WBC # BLD AUTO: 7.4 K/UL (ref 4.8–10.8)

## 2019-03-20 PROCEDURE — 97535 SELF CARE MNGMENT TRAINING: CPT

## 2019-03-20 PROCEDURE — 86900 BLOOD TYPING SEROLOGIC ABO: CPT

## 2019-03-20 PROCEDURE — L4398 FOOT DROP SPLINT PRE OTS: HCPCS

## 2019-03-20 PROCEDURE — 770022 HCHG ROOM/CARE - ICU (200)

## 2019-03-20 PROCEDURE — A9270 NON-COVERED ITEM OR SERVICE: HCPCS | Performed by: ORTHOPAEDIC SURGERY

## 2019-03-20 PROCEDURE — A9270 NON-COVERED ITEM OR SERVICE: HCPCS | Performed by: NURSE PRACTITIONER

## 2019-03-20 PROCEDURE — 80048 BASIC METABOLIC PNL TOTAL CA: CPT

## 2019-03-20 PROCEDURE — 700105 HCHG RX REV CODE 258: Performed by: SURGERY

## 2019-03-20 PROCEDURE — 83550 IRON BINDING TEST: CPT

## 2019-03-20 PROCEDURE — 83540 ASSAY OF IRON: CPT

## 2019-03-20 PROCEDURE — 97166 OT EVAL MOD COMPLEX 45 MIN: CPT

## 2019-03-20 PROCEDURE — 700112 HCHG RX REV CODE 229: Performed by: ORTHOPAEDIC SURGERY

## 2019-03-20 PROCEDURE — 700102 HCHG RX REV CODE 250 W/ 637 OVERRIDE(OP): Performed by: ORTHOPAEDIC SURGERY

## 2019-03-20 PROCEDURE — 85046 RETICYTE/HGB CONCENTRATE: CPT

## 2019-03-20 PROCEDURE — A9270 NON-COVERED ITEM OR SERVICE: HCPCS | Performed by: SURGERY

## 2019-03-20 PROCEDURE — 85025 COMPLETE CBC W/AUTO DIFF WBC: CPT

## 2019-03-20 PROCEDURE — 700111 HCHG RX REV CODE 636 W/ 250 OVERRIDE (IP): Performed by: SURGERY

## 2019-03-20 PROCEDURE — 97162 PT EVAL MOD COMPLEX 30 MIN: CPT

## 2019-03-20 PROCEDURE — 86901 BLOOD TYPING SEROLOGIC RH(D): CPT

## 2019-03-20 PROCEDURE — L0458 TLSO 2MOD SYMPHIS-XIPHO PRE: HCPCS

## 2019-03-20 PROCEDURE — 700102 HCHG RX REV CODE 250 W/ 637 OVERRIDE(OP): Performed by: SURGERY

## 2019-03-20 PROCEDURE — 86850 RBC ANTIBODY SCREEN: CPT

## 2019-03-20 PROCEDURE — 99233 SBSQ HOSP IP/OBS HIGH 50: CPT | Performed by: SURGERY

## 2019-03-20 PROCEDURE — L0464 TLSO 4MOD SACRO-SCAP PRE: HCPCS

## 2019-03-20 PROCEDURE — 700102 HCHG RX REV CODE 250 W/ 637 OVERRIDE(OP): Performed by: NURSE PRACTITIONER

## 2019-03-20 RX ORDER — DIPHENHYDRAMINE HCL 25 MG
25 TABLET ORAL ONCE
Status: COMPLETED | OUTPATIENT
Start: 2019-03-20 | End: 2019-03-20

## 2019-03-20 RX ORDER — METAXALONE 800 MG/1
800 TABLET ORAL 3 TIMES DAILY
Status: DISCONTINUED | OUTPATIENT
Start: 2019-03-20 | End: 2019-03-26 | Stop reason: HOSPADM

## 2019-03-20 RX ORDER — ACETAMINOPHEN 325 MG/1
650 TABLET ORAL ONCE
Status: COMPLETED | OUTPATIENT
Start: 2019-03-20 | End: 2019-03-20

## 2019-03-20 RX ORDER — DIPHENHYDRAMINE HYDROCHLORIDE 50 MG/ML
25 INJECTION INTRAMUSCULAR; INTRAVENOUS ONCE
Status: COMPLETED | OUTPATIENT
Start: 2019-03-20 | End: 2019-03-20

## 2019-03-20 RX ADMIN — SODIUM CHLORIDE 1950 MG: 9 INJECTION, SOLUTION INTRAVENOUS at 17:05

## 2019-03-20 RX ADMIN — DOCUSATE SODIUM 100 MG: 100 CAPSULE, LIQUID FILLED ORAL at 05:23

## 2019-03-20 RX ADMIN — ACETAMINOPHEN 650 MG: 325 TABLET, FILM COATED ORAL at 17:05

## 2019-03-20 RX ADMIN — SODIUM CHLORIDE 25 MG: 9 INJECTION, SOLUTION INTRAVENOUS at 13:50

## 2019-03-20 RX ADMIN — ACETAMINOPHEN 650 MG: 325 TABLET, FILM COATED ORAL at 23:11

## 2019-03-20 RX ADMIN — ACETAMINOPHEN 650 MG: 325 TABLET, FILM COATED ORAL at 00:00

## 2019-03-20 RX ADMIN — OXYCODONE HYDROCHLORIDE 10 MG: 10 TABLET ORAL at 23:11

## 2019-03-20 RX ADMIN — DOCUSATE SODIUM 100 MG: 100 CAPSULE, LIQUID FILLED ORAL at 17:05

## 2019-03-20 RX ADMIN — OXYCODONE HYDROCHLORIDE 5 MG: 5 TABLET ORAL at 10:09

## 2019-03-20 RX ADMIN — DIPHENHYDRAMINE HCL 25 MG: 25 TABLET ORAL at 13:31

## 2019-03-20 RX ADMIN — ACETAMINOPHEN 650 MG: 325 TABLET, FILM COATED ORAL at 05:30

## 2019-03-20 RX ADMIN — MAGNESIUM HYDROXIDE 30 ML: 400 SUSPENSION ORAL at 03:35

## 2019-03-20 RX ADMIN — OXYCODONE HYDROCHLORIDE 5 MG: 5 TABLET ORAL at 02:03

## 2019-03-20 RX ADMIN — METAXALONE 800 MG: 800 TABLET ORAL at 14:27

## 2019-03-20 RX ADMIN — OXYCODONE HYDROCHLORIDE 10 MG: 10 TABLET ORAL at 15:43

## 2019-03-20 RX ADMIN — ACETAMINOPHEN 650 MG: 325 TABLET, FILM COATED ORAL at 13:31

## 2019-03-20 ASSESSMENT — COGNITIVE AND FUNCTIONAL STATUS - GENERAL
TOILETING: A LOT
DRESSING REGULAR LOWER BODY CLOTHING: A LOT
SUGGESTED CMS G CODE MODIFIER DAILY ACTIVITY: CL
CLIMB 3 TO 5 STEPS WITH RAILING: TOTAL
DRESSING REGULAR UPPER BODY CLOTHING: A LOT
EATING MEALS: A LITTLE
SUGGESTED CMS G CODE MODIFIER MOBILITY: CM
PERSONAL GROOMING: A LOT
MOBILITY SCORE: 7
DAILY ACTIVITIY SCORE: 13
HELP NEEDED FOR BATHING: A LOT
MOVING TO AND FROM BED TO CHAIR: UNABLE
TURNING FROM BACK TO SIDE WHILE IN FLAT BAD: UNABLE
STANDING UP FROM CHAIR USING ARMS: A LOT
WALKING IN HOSPITAL ROOM: TOTAL
MOVING FROM LYING ON BACK TO SITTING ON SIDE OF FLAT BED: UNABLE

## 2019-03-20 ASSESSMENT — ENCOUNTER SYMPTOMS
NAUSEA: 0
DIARRHEA: 0
BACK PAIN: 1
CHILLS: 0
HEADACHES: 0
ROS GI COMMENTS: LAST BOWEL MOVEMENT PTA
MYALGIAS: 1
VOMITING: 0
SHORTNESS OF BREATH: 0
FEVER: 0
COUGH: 0
DIZZINESS: 0
PALPITATIONS: 0

## 2019-03-20 ASSESSMENT — ACTIVITIES OF DAILY LIVING (ADL): TOILETING: INDEPENDENT

## 2019-03-20 NOTE — CARE PLAN
Problem: Bowel/Gastric:  Goal: Normal bowel function is maintained or improved  Outcome: PROGRESSING AS EXPECTED      Problem: Pain Management  Goal: Pain level will decrease to patient's comfort goal  Outcome: PROGRESSING AS EXPECTED  Assessed patient's pain via 0-10 scale, medicated per MAR. Also providing repositions, extra pillows, emotional support and therapeutic presence.

## 2019-03-20 NOTE — PROGRESS NOTES
POD#2  S/P ORIF right acetabulum  TTWB x 10 weeks  PT/OT when able  Pain control  SCDs  Equinus boot for foot drop

## 2019-03-20 NOTE — PROGRESS NOTES
IV Iron Per Pharmacy Note    Patient Lean Body Weight:  73 kg  Reason for Iron Replacement: Acute blood loss      Lab Results   Component Value Date/Time    WBC 7.4 03/20/2019 04:55 AM    RBC 2.73 (L) 03/20/2019 04:55 AM    HEMOGLOBIN 9.0 (L) 03/20/2019 04:55 AM    HEMATOCRIT 25.2 (L) 03/20/2019 04:55 AM    MCV 92.3 03/20/2019 04:55 AM    MCH 33.0 03/20/2019 04:55 AM    MCHC 35.7 (H) 03/20/2019 04:55 AM    MPV 10.5 03/20/2019 04:55 AM       Recent Labs      03/20/19   0430   IRON  16*         Recent Labs      03/19/19   0525   CREATININE  0.72          Assessment/Plan:  1. IV Iron Indicated.   2. Give Iron Dextran 25 mg IV test dose following diphenhydramine/acetaminophen premeds over 30 minutes per protocol.  3. If no reaction (Anaphylaxis, Hypotension/Hypertension, N/V/D, Chest pain/Back Pain, Urticaria/Pruritis) in the next hour, proceed to full dose. Nursing to call the pharmacy IV room at ext. [phone] for full dose.  4. Full dose: Iron Dextran 1950 mg IV over 4 hours. Continue to monitor for delayed ADR including: Arthralgia/myalgia, Headache/backache, chills/dizziness/malaise, moderate to high fever and n/v.      Katarina Iqbal, Pharm.D., BCPS

## 2019-03-20 NOTE — CARE PLAN
Problem: Bowel/Gastric:  Goal: Normal bowel function is maintained or improved  Scheduled bowel bowel protocol and PRN bowel protocol administered to promote normal bowel function.    Problem: Respiratory:  Goal: Respiratory status will improve  Pt is on 2 L O2 per NC. This RN encourages deep breathing and coughing exercises hourly. Pt is self-motivated and tolerates breathing exercises well. Pt pulls 3000 mL on the IS.

## 2019-03-20 NOTE — ASSESSMENT & PLAN NOTE
3/16 Admission date.  3/20 Cleared for transfer from SICU.  3/22 Rehab/SNF consult placed.  3/23 Medically cleared for discharge.  Discharged delayed: yes.  Reasons: Pending insurance authorization  Discharge date.3/26

## 2019-03-20 NOTE — PROGRESS NOTES
Neurosurgery Progress Note    Subjective:  C/O: Incisional pain  Ambulatory  Nair in place, passing flatus  Pain well controlled on oral medications  Denies new pain, numbness, weakness.   Denies HA or positional HA, dizziness, chest pain, SOB, difficulty breathing, constitutional symptoms, GI symptoms        Exam:  VSS  A&Ox4, NAD  NM: 5/5 hip flexion Right, left is SP ORIF, knee extension, knee flexion, plantar flexion, EHL  dorsiflexion left 5/5 right 2-3/5  Sensation intact and equal throughout all four extremities. Mild dysesthesia left first dorsal webspace   Abdomen: soft, non-tender  Non-labored breathing on room air, normal respiratory effort  Capillary refill in all four extremities <2 seconds  No LE edema, erythema, cyanosis, clubbing  Calves non-tender to compression bilat  Incision CDI, no halo sign  Drain: 150cc in 12hrs          BP  Min: 160/74  Max: 160/74  Pulse  Av.5  Min: 84  Max: 109  Resp  Av.8  Min: 6  Max: 94  Temp  Av.9 °C (98.5 °F)  Min: 36.9 °C (98.4 °F)  Max: 37 °C (98.6 °F)  Monitored Temp 2  Av.4 °C (99.4 °F)  Min: 37 °C (98.6 °F)  Max: 37.9 °C (100.2 °F)  SpO2  Av.8 %  Min: 93 %  Max: 100 %    No Data Recorded    Recent Labs      19   0415  19   0525  19   0455   WBC  9.0  6.8  7.4   RBC  3.85*  2.95*  2.73*   HEMOGLOBIN  12.6*  9.6*  9.0*   HEMATOCRIT  38.2*  28.4*  25.2*   MCV  99.2*  96.3  92.3   MCH  32.7  32.5  33.0   MCHC  33.0*  33.8  35.7*   RDW  47.6  44.8  41.1   PLATELETCT  151*  140*  203   MPV  10.7  10.6  10.5     Recent Labs      19   0415  19   0525   SODIUM  134*  134*   POTASSIUM  4.1  3.6   CHLORIDE  107  105   CO2  13*  21   GLUCOSE  109*  120*   BUN  8  7*   CREATININE  0.78  0.72   CALCIUM  7.4*  7.4*     Recent Labs      19   1220   APTT  29.9   INR  1.13           Intake/Output       19 0700 - 19 0659 19 0700 - 19 0659      2962-5689 2354-9175 Total 2756-2039 8326-7602 Total        Intake    P.O.  520  960 1480  --  -- --    P.O.  -- -- --    I.V.  2494.2  900 3394.2  --  -- --    Volume (mL) (NS infusion) 794.2 900 1694.2 -- -- --    Volume (mL) (lactated ringers infusion) 1600 -- 1600 -- -- --    Volume (mL) (lactated ringers infusion) 100 -- 100 -- -- --    Total Intake 3014.2 1860 4874.2 -- -- --       Output    Urine  2050  1560 3610  400  -- 400    Output (mL) (Urethral Catheter Temperature probe 16 Fr.) 2050 1560 3610 400 -- 400    Drains  60  150 210  --  -- --    Output (mL) (Closed/Suction Drain 1 Posterior Back Hemovac) 60 150 210 -- -- --    Output (mL) (Closed/Suction Drain 1 Posterior Back) 0 -- 0 -- -- --    Blood  100  -- 100  --  -- --    Est. Blood Loss 100 -- 100 -- -- --    Total Output 2210 1710 3920 400 -- 400       Net I/O     804.2 150 954.2 -400 -- -400            Intake/Output Summary (Last 24 hours) at 03/20/19 0842  Last data filed at 03/20/19 0800   Gross per 24 hour   Intake          4624.17 ml   Output             4245 ml   Net           379.17 ml            • gabapentin  300 mg Pre-Op Once   • acetaminophen  1,000 mg Once   • Pharmacy Consult Request  1 Each PHARMACY TO DOSE   • ondansetron  4 mg Q4HRS PRN   • dexamethasone  4 mg Once PRN   • diphenhydrAMINE  25 mg Q6HRS PRN   • haloperidol lactate  1 mg Q6HRS PRN   • scopolamine  1 Patch Q72HRS PRN   • docusate sodium  100 mg BID   • senna-docusate  1 Tab Nightly   • senna-docusate  1 Tab Q24HRS PRN   • polyethylene glycol/lytes  1 Packet BID PRN   • magnesium hydroxide  30 mL QDAY PRN   • bisacodyl  10 mg Q24HRS PRN   • fleet  1 Each Once PRN   • acetaminophen  650 mg Q6HRS   • oxyCODONE immediate-release  5 mg Q3HRS PRN   • oxyCODONE immediate release  10 mg Q3HRS PRN   • Respiratory Care per Protocol   Continuous RT   • NS   Continuous   • morphine injection  4 mg Q3HRS PRN   • acetaminophen  650 mg Q4HRS PRN    Or   • acetaminophen  650 mg Q4HRS PRN       Assessment and Plan:  Hospital day  #4  POD #1 Right L4-5 hemilaminectomy, L4-S1 TLIF   Prophylactic anticoagulation: no         Start date/time: TBD  Prevena incisional management in place, no output, not alarming.  D/c drain when meets criteria   Ambulate, work with therapies  Continue incentive spirometry Q1H  Continue pain control  Continue stool softeners to encourage BM  Ok to transfer to floor from NSG perspective when medically appropriate.   Patient agrees with treatment plan.   Case discussed with Dr. Muñoz.

## 2019-03-20 NOTE — THERAPY
"Occupational Therapy Evaluation completed.   Functional Status:  Max A with ADLs and txfs, 2/2 to weakness and pn  Plan of Care: Will benefit from Occupational Therapy 3 times per week  Discharge Recommendations:  Equipment: Will Continue to Assess for Equipment Needs. Post-acute therapy Discharge to a transitional care facility for continued therapy services.    See \"Rehab Therapy-Acute\" Patient Summary Report for complete documentation.    "

## 2019-03-20 NOTE — PROGRESS NOTES
2 RN skin check complete. Pt has a Prevena wound vac in place to lumbar incision site. Hemovac in place to midback above incision. No areas of concern observed. All appropriate preventative measures in place.
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
## 2019-03-20 NOTE — PROGRESS NOTES
2 RN skin check completed. Appropriate barriers in place. Skin under prafo boot intact: boot on for 2 hour intervals. Mepilexs padding along prevena and hemovac tubing and B hips.
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
## 2019-03-21 ENCOUNTER — APPOINTMENT (OUTPATIENT)
Dept: RADIOLOGY | Facility: MEDICAL CENTER | Age: 59
DRG: 958 | End: 2019-03-21
Attending: NURSE PRACTITIONER
Payer: COMMERCIAL

## 2019-03-21 PROBLEM — R91.1 PULMONARY NODULE, RIGHT: Status: ACTIVE | Noted: 2019-03-21

## 2019-03-21 LAB
ANION GAP SERPL CALC-SCNC: 5 MMOL/L (ref 0–11.9)
BASOPHILS # BLD AUTO: 0.2 % (ref 0–1.8)
BASOPHILS # BLD: 0.01 K/UL (ref 0–0.12)
BUN SERPL-MCNC: 9 MG/DL (ref 8–22)
CALCIUM SERPL-MCNC: 8 MG/DL (ref 8.5–10.5)
CHLORIDE SERPL-SCNC: 103 MMOL/L (ref 96–112)
CO2 SERPL-SCNC: 28 MMOL/L (ref 20–33)
CREAT SERPL-MCNC: 0.56 MG/DL (ref 0.5–1.4)
EOSINOPHIL # BLD AUTO: 0.06 K/UL (ref 0–0.51)
EOSINOPHIL NFR BLD: 0.9 % (ref 0–6.9)
ERYTHROCYTE [DISTWIDTH] IN BLOOD BY AUTOMATED COUNT: 42.4 FL (ref 35.9–50)
GLUCOSE SERPL-MCNC: 107 MG/DL (ref 65–99)
HCT VFR BLD AUTO: 24.7 % (ref 42–52)
HGB BLD-MCNC: 8.7 G/DL (ref 14–18)
IMM GRANULOCYTES # BLD AUTO: 0.02 K/UL (ref 0–0.11)
IMM GRANULOCYTES NFR BLD AUTO: 0.3 % (ref 0–0.9)
LYMPHOCYTES # BLD AUTO: 0.66 K/UL (ref 1–4.8)
LYMPHOCYTES NFR BLD: 10.3 % (ref 22–41)
MCH RBC QN AUTO: 32.7 PG (ref 27–33)
MCHC RBC AUTO-ENTMCNC: 35.2 G/DL (ref 33.7–35.3)
MCV RBC AUTO: 92.9 FL (ref 81.4–97.8)
MONOCYTES # BLD AUTO: 0.78 K/UL (ref 0–0.85)
MONOCYTES NFR BLD AUTO: 12.2 % (ref 0–13.4)
NEUTROPHILS # BLD AUTO: 4.87 K/UL (ref 1.82–7.42)
NEUTROPHILS NFR BLD: 76.1 % (ref 44–72)
NRBC # BLD AUTO: 0 K/UL
NRBC BLD-RTO: 0 /100 WBC
PLATELET # BLD AUTO: 224 K/UL (ref 164–446)
PMV BLD AUTO: 9.6 FL (ref 9–12.9)
POTASSIUM SERPL-SCNC: 3.2 MMOL/L (ref 3.6–5.5)
RBC # BLD AUTO: 2.66 M/UL (ref 4.7–6.1)
SODIUM SERPL-SCNC: 136 MMOL/L (ref 135–145)
WBC # BLD AUTO: 6.4 K/UL (ref 4.8–10.8)

## 2019-03-21 PROCEDURE — 80048 BASIC METABOLIC PNL TOTAL CA: CPT

## 2019-03-21 PROCEDURE — 700112 HCHG RX REV CODE 229: Performed by: ORTHOPAEDIC SURGERY

## 2019-03-21 PROCEDURE — 770001 HCHG ROOM/CARE - MED/SURG/GYN PRIV*

## 2019-03-21 PROCEDURE — A9270 NON-COVERED ITEM OR SERVICE: HCPCS | Performed by: ORTHOPAEDIC SURGERY

## 2019-03-21 PROCEDURE — 51798 US URINE CAPACITY MEASURE: CPT

## 2019-03-21 PROCEDURE — 99233 SBSQ HOSP IP/OBS HIGH 50: CPT | Performed by: SURGERY

## 2019-03-21 PROCEDURE — A9270 NON-COVERED ITEM OR SERVICE: HCPCS | Performed by: NURSE PRACTITIONER

## 2019-03-21 PROCEDURE — A9270 NON-COVERED ITEM OR SERVICE: HCPCS | Performed by: SURGERY

## 2019-03-21 PROCEDURE — 700102 HCHG RX REV CODE 250 W/ 637 OVERRIDE(OP): Performed by: NURSE PRACTITIONER

## 2019-03-21 PROCEDURE — 85025 COMPLETE CBC W/AUTO DIFF WBC: CPT

## 2019-03-21 PROCEDURE — 700102 HCHG RX REV CODE 250 W/ 637 OVERRIDE(OP): Performed by: ORTHOPAEDIC SURGERY

## 2019-03-21 PROCEDURE — 700102 HCHG RX REV CODE 250 W/ 637 OVERRIDE(OP): Performed by: SURGERY

## 2019-03-21 RX ORDER — POTASSIUM CHLORIDE 20 MEQ/1
40 TABLET, EXTENDED RELEASE ORAL ONCE
Status: COMPLETED | OUTPATIENT
Start: 2019-03-21 | End: 2019-03-21

## 2019-03-21 RX ADMIN — OXYCODONE HYDROCHLORIDE 5 MG: 5 TABLET ORAL at 16:44

## 2019-03-21 RX ADMIN — METAXALONE 800 MG: 800 TABLET ORAL at 06:02

## 2019-03-21 RX ADMIN — POTASSIUM CHLORIDE 40 MEQ: 1500 TABLET, EXTENDED RELEASE ORAL at 11:31

## 2019-03-21 RX ADMIN — OXYCODONE HYDROCHLORIDE 5 MG: 5 TABLET ORAL at 22:05

## 2019-03-21 RX ADMIN — POLYETHYLENE GLYCOL 3350 1 PACKET: 17 POWDER, FOR SOLUTION ORAL at 06:04

## 2019-03-21 RX ADMIN — METAXALONE 800 MG: 800 TABLET ORAL at 18:23

## 2019-03-21 RX ADMIN — ACETAMINOPHEN 650 MG: 325 TABLET, FILM COATED ORAL at 18:23

## 2019-03-21 RX ADMIN — METAXALONE 800 MG: 800 TABLET ORAL at 11:30

## 2019-03-21 RX ADMIN — ACETAMINOPHEN 650 MG: 325 TABLET, FILM COATED ORAL at 11:30

## 2019-03-21 RX ADMIN — DOCUSATE SODIUM 100 MG: 100 CAPSULE, LIQUID FILLED ORAL at 06:02

## 2019-03-21 RX ADMIN — ACETAMINOPHEN 650 MG: 325 TABLET, FILM COATED ORAL at 06:02

## 2019-03-21 RX ADMIN — MAGNESIUM CITRATE 148 ML: 1.75 LIQUID ORAL at 11:31

## 2019-03-21 ASSESSMENT — ENCOUNTER SYMPTOMS
DIARRHEA: 0
COUGH: 0
HEADACHES: 0
BACK PAIN: 1
MYALGIAS: 1
FEVER: 0
CHILLS: 0
NAUSEA: 0
PALPITATIONS: 0
ROS GI COMMENTS: LAST BOWEL MOVEMENT PTA
DIZZINESS: 0
VOMITING: 0
SHORTNESS OF BREATH: 0

## 2019-03-21 NOTE — CARE PLAN
Problem: Pain Management  Goal: Pain level will decrease to patient's comfort goal  Outcome: PROGRESSING AS EXPECTED  Pt's pain will decrease to comfort goal through rest, repositioning, a quiet environment, and PRN pharmacologic analgesia.        Problem: Skin Integrity  Goal: Risk for impaired skin integrity will decrease  Outcome: PROGRESSING AS EXPECTED  Skin will be assessed frequently for breakdown and pt will remain clean, dry, and intact. All listed wounds will be assessed.

## 2019-03-21 NOTE — PROGRESS NOTES
Two RN head to toe skin assessment completed.  The following areas of concerns are noted:    - Abrasion left forarm  - Skin under Prevena and hemovac padded with mepilex  - Foot drop boot on for 2 hour intervals, no redness noted    Appropriate interventions in place.

## 2019-03-21 NOTE — PROGRESS NOTES
2 RN Skin check complete         All documented wounds assessed. See integumentary/wound flowsheets for documentation. All wound prevention protocols in place.

## 2019-03-21 NOTE — CARE PLAN
Problem: Safety  Goal: Will remain free from injury  Safety and fall precautions in place, bed in lowest position. Pt room near nursing station. Bed alarm on.        Problem: Knowledge Deficit  Goal: Knowledge of disease process/condition, treatment plan, diagnostic tests, and medications will improve  Bedside report received.  POC discussed with patient, addressed his questions and concerns to the best of my ability.

## 2019-03-21 NOTE — PROGRESS NOTES
"Orthopaedic Progress Note    Author: Rinku Miller Date & Time created: 3/20/2019   5:00 PM     Interval Events:  POD#2 S/P:  1.  Open reduction and internal fixation, right transverse posterior wall acetabular fracture.  2.  Removal of traction pin, distal femur.  DNVI  Dressing CDI     Review of Systems   Constitutional: Negative for chills and fever.   Respiratory: Negative for shortness of breath.    Cardiovascular: Negative for chest pain.     Hemodynamics:  Blood pressure 160/74, pulse 86, temperature 37.8 °C (100.1 °F), temperature source Temporal, resp. rate 20, height 1.778 m (5' 10\"), weight 84.5 kg (186 lb 4.6 oz), SpO2 95 %.     No Active Precaution Orders    Respiratory:    Respiration: 20, Pulse Oximetry: 95 %     Work Of Breathing / Effort: Mild;Shallow  RUL Breath Sounds: Clear, RML Breath Sounds: Clear, RLL Breath Sounds: Diminished, MAYRA Breath Sounds: Clear, LLL Breath Sounds: Diminished  Fluids:    Intake/Output Summary (Last 24 hours) at 03/20/19 1700  Last data filed at 03/20/19 1400   Gross per 24 hour   Intake          3457.92 ml   Output             3705 ml   Net          -247.08 ml     Admit Weight: 79.4 kg (175 lb)  Current Weight: 84.5 kg (186 lb 4.6 oz)    Physical Exam   Musculoskeletal: Normal range of motion.   Surgical dressing is clean, dry, and intact. Patient clearly fires tibialis anterior, EHL, and gastrocnemius/soleus. Sensation is intact to light touch throughout superficial peroneal, deep peroneal, tibial, saphenous, and sural nerve distributions. Strong and palpable 2+ dorsalis pedis and posterior tibial pulses with capillary refill less than 2 seconds. No lower leg tenderness or discomfort.       Labs:  Recent Labs      03/18/19   0415  03/19/19   0525  03/20/19   0455   WBC  9.0  6.8  7.4   RBC  3.85*  2.95*  2.73*   HEMOGLOBIN  12.6*  9.6*  9.0*   HEMATOCRIT  38.2*  28.4*  25.2*   MCV  99.2*  96.3  92.3   MCH  32.7  32.5  33.0   MCHC  33.0*  33.8  35.7*   RDW  47.6  " 44.8  41.1   PLATELETCT  151*  140*  203   MPV  10.7  10.6  10.5     Recent Labs      03/18/19   0415  03/19/19   0525   SODIUM  134*  134*   POTASSIUM  4.1  3.6   CHLORIDE  107  105   CO2  13*  21   GLUCOSE  109*  120*   BUN  8  7*   CREATININE  0.78  0.72   CALCIUM  7.4*  7.4*       Medical Decision Making/Problem List:    Active Hospital Problems    Diagnosis   • Closed fracture of acetabulum (HCC) [S32.409A]   • Closed fracture of lumbar vertebra (HCC) [S32.009A]   • Traumatic retroperitoneal hematoma [S36.892A]   • Iron deficiency anemia [D50.9]   • Discharge planning issues [Z02.9]   • Contraindication to deep vein thrombosis (DVT) prophylaxis [Z53.09]   • Trauma [T14.90XA]     Core Measures & Quality Metrics:  Current DVT prophylaxis: SCDs  Discussed patient condition with RN and Patient.  Clearance for lovenox/heparin: OK from Ortho standpoint, pending NSGY clearance  Weight Bearing Status: TTWB RLE  Wounds & Drains: Dressing changes at RN discretion to pelvis - may use dry sterile sdressings and change QOD/PRN if soaked  Disposition and Follow-up: will follow
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
## 2019-03-21 NOTE — PROGRESS NOTES
Neurosurgery Progress Note    Subjective:  C/O: Incisional pain slowly improving, continued RLE foot drop, no new symptoms.  Ambulatory  Voiding, passing flatus  Pain well controlled on oral medications  Denies new pain, numbness, weakness.   Denies HA or positional HA, dizziness, chest pain, SOB, difficulty breathing, constitutional symptoms, GI symptoms    Exam:  VSS  A&Ox4, NAD  NM: 5/5 hip flexion Right, left is SP ORIF, knee extension, knee flexion, plantar flexion, EHL  dorsiflexion left 5/5 right 2+-3/5  Sensation intact and equal throughout all four extremities. Mild dysesthesia left first dorsal webspace   Abdomen: soft, non-tender  Non-labored breathing on room air, normal respiratory effort  Capillary refill in all four extremities <2 seconds  No LE edema, erythema, cyanosis, clubbing  Calves non-tender to compression bilat  Incision CDI, no halo sign  Drain: 50cc in 12hrs  Pulse  Av.1  Min: 31  Max: 111  Resp  Av  Min: 13  Max: 57  Temp  Av.5 °C (99.5 °F)  Min: 37.2 °C (99 °F)  Max: 37.8 °C (100.1 °F)  Monitored Temp 2  Av.7 °C (99.8 °F)  Min: 37.6 °C (99.7 °F)  Max: 37.7 °C (99.9 °F)  SpO2  Av %  Min: 89 %  Max: 97 %    No Data Recorded    Recent Labs      19   0525  19   0455  19   0600   WBC  6.8  7.4  6.4   RBC  2.95*  2.73*  2.66*   HEMOGLOBIN  9.6*  9.0*  8.7*   HEMATOCRIT  28.4*  25.2*  24.7*   MCV  96.3  92.3  92.9   MCH  32.5  33.0  32.7   MCHC  33.8  35.7*  35.2   RDW  44.8  41.1  42.4   PLATELETCT  140*  203  224   MPV  10.6  10.5  9.6     Recent Labs      19   0525  19   0430  19   0600   SODIUM  134*  135  136   POTASSIUM  3.6  4.3  3.2*   CHLORIDE  105  105  103   CO2  21  22  28   GLUCOSE  120*  116*  107*   BUN  7*  9  9   CREATININE  0.72  0.65  0.56   CALCIUM  7.4*  7.6*  8.0*               Intake/Output       19 - 19 0659 19 - 1959      1900-0659 Total  Total        Intake    P.O.  1450  240 1690  --  -- --    P.O. 2612 826 5382 -- -- --    I.V.  300  -- 300  --  -- --    Volume (mL) (NS infusion) 300 -- 300 -- -- --    IV Piggyback  107.3  192.7 300  --  -- --    Volume (mL) (iron dextran complex (INFED) 25 mg in NS 50 mL IVPB) 50 -- 50 -- -- --    Volume (mL) (iron dextran complex (INFED) 1,950 mg in  mL IVPB) 57.3 192.7 250 -- -- --    Total Intake 1857.3 432.7 2290 -- -- --       Output    Urine  1875  -- 1875  --  -- --    Number of Times Voided 2 x -- 2 x 1 x -- 1 x    Urine Void (mL) 1075 -- 1075 -- -- --    Output (mL) ([REMOVED] Urethral Catheter Temperature probe 16 Fr.) 800 -- 800 -- -- --    Drains  80  50 130  --  -- --    Output (mL) (Closed/Suction Drain 1 Posterior Back Hemovac) 80 50 130 -- -- --    Stool  --  -- --  --  -- --    Number of Times Stooled -- 0 x 0 x -- -- --    Total Output 1955 50 2005 -- -- --       Net I/O     -97.7 382.7 285 -- -- --            Intake/Output Summary (Last 24 hours) at 03/21/19 0901  Last data filed at 03/21/19 0600   Gross per 24 hour   Intake             2140 ml   Output             1605 ml   Net              535 ml       $ Bladder Scan Results (mL): 999    • metaxalone  800 mg TID   • Pharmacy Consult Request  1 Each PHARMACY TO DOSE   • ondansetron  4 mg Q4HRS PRN   • dexamethasone  4 mg Once PRN   • diphenhydrAMINE  25 mg Q6HRS PRN   • haloperidol lactate  1 mg Q6HRS PRN   • scopolamine  1 Patch Q72HRS PRN   • docusate sodium  100 mg BID   • senna-docusate  1 Tab Nightly   • senna-docusate  1 Tab Q24HRS PRN   • polyethylene glycol/lytes  1 Packet BID PRN   • magnesium hydroxide  30 mL QDAY PRN   • bisacodyl  10 mg Q24HRS PRN   • fleet  1 Each Once PRN   • acetaminophen  650 mg Q6HRS   • oxyCODONE immediate-release  5 mg Q3HRS PRN   • oxyCODONE immediate release  10 mg Q3HRS PRN   • Respiratory Care per Protocol   Continuous RT   • morphine injection  4 mg Q3HRS PRN   • acetaminophen  650 mg Q4HRS PRN    Or   •  acetaminophen  650 mg Q4HRS PRN       Assessment and Plan:  Hospital day #5  POD #2 Right L4-5 hemilaminectomy, L4-S1 TLIF   Prophylactic anticoagulation: no         Start date/time: TBD pending drain d/c  Prevena incisional management in place, no output, not alarming.  D/c drain when meets criteria please check at 8hrs then d/c if equal to or less than 30cc   Ambulate, work with therapies  Continue incentive spirometry Q1H  Continue pain control  Continue stool softeners to encourage BM  Ok to transfer to floor from NSG perspective when medically appropriate.   Patient agrees with treatment plan.   Case discussed with Dr. Muñoz.

## 2019-03-22 ENCOUNTER — APPOINTMENT (OUTPATIENT)
Dept: RADIOLOGY | Facility: MEDICAL CENTER | Age: 59
DRG: 958 | End: 2019-03-22
Attending: NURSE PRACTITIONER
Payer: COMMERCIAL

## 2019-03-22 LAB
ANION GAP SERPL CALC-SCNC: 6 MMOL/L (ref 0–11.9)
BASOPHILS # BLD AUTO: 0.3 % (ref 0–1.8)
BASOPHILS # BLD: 0.02 K/UL (ref 0–0.12)
BUN SERPL-MCNC: 9 MG/DL (ref 8–22)
CALCIUM SERPL-MCNC: 8 MG/DL (ref 8.5–10.5)
CHLORIDE SERPL-SCNC: 103 MMOL/L (ref 96–112)
CO2 SERPL-SCNC: 27 MMOL/L (ref 20–33)
CREAT SERPL-MCNC: 0.59 MG/DL (ref 0.5–1.4)
EOSINOPHIL # BLD AUTO: 0.11 K/UL (ref 0–0.51)
EOSINOPHIL NFR BLD: 1.8 % (ref 0–6.9)
ERYTHROCYTE [DISTWIDTH] IN BLOOD BY AUTOMATED COUNT: 40.6 FL (ref 35.9–50)
GLUCOSE SERPL-MCNC: 108 MG/DL (ref 65–99)
HCT VFR BLD AUTO: 24.9 % (ref 42–52)
HGB BLD-MCNC: 8.6 G/DL (ref 14–18)
IMM GRANULOCYTES # BLD AUTO: 0.06 K/UL (ref 0–0.11)
IMM GRANULOCYTES NFR BLD AUTO: 1 % (ref 0–0.9)
LYMPHOCYTES # BLD AUTO: 0.74 K/UL (ref 1–4.8)
LYMPHOCYTES NFR BLD: 12 % (ref 22–41)
MCH RBC QN AUTO: 31.7 PG (ref 27–33)
MCHC RBC AUTO-ENTMCNC: 34.5 G/DL (ref 33.7–35.3)
MCV RBC AUTO: 91.9 FL (ref 81.4–97.8)
MONOCYTES # BLD AUTO: 0.77 K/UL (ref 0–0.85)
MONOCYTES NFR BLD AUTO: 12.5 % (ref 0–13.4)
NEUTROPHILS # BLD AUTO: 4.46 K/UL (ref 1.82–7.42)
NEUTROPHILS NFR BLD: 72.4 % (ref 44–72)
NRBC # BLD AUTO: 0 K/UL
NRBC BLD-RTO: 0 /100 WBC
PLATELET # BLD AUTO: 266 K/UL (ref 164–446)
PMV BLD AUTO: 9.7 FL (ref 9–12.9)
POTASSIUM SERPL-SCNC: 3.4 MMOL/L (ref 3.6–5.5)
RBC # BLD AUTO: 2.71 M/UL (ref 4.7–6.1)
SODIUM SERPL-SCNC: 136 MMOL/L (ref 135–145)
WBC # BLD AUTO: 6.2 K/UL (ref 4.8–10.8)

## 2019-03-22 PROCEDURE — 97116 GAIT TRAINING THERAPY: CPT

## 2019-03-22 PROCEDURE — 700102 HCHG RX REV CODE 250 W/ 637 OVERRIDE(OP): Performed by: NURSE PRACTITIONER

## 2019-03-22 PROCEDURE — 97530 THERAPEUTIC ACTIVITIES: CPT

## 2019-03-22 PROCEDURE — A9270 NON-COVERED ITEM OR SERVICE: HCPCS | Performed by: NURSE PRACTITIONER

## 2019-03-22 PROCEDURE — 85025 COMPLETE CBC W/AUTO DIFF WBC: CPT

## 2019-03-22 PROCEDURE — 99358 PROLONG SERVICE W/O CONTACT: CPT | Performed by: PHYSICAL MEDICINE & REHABILITATION

## 2019-03-22 PROCEDURE — 700102 HCHG RX REV CODE 250 W/ 637 OVERRIDE(OP): Performed by: ORTHOPAEDIC SURGERY

## 2019-03-22 PROCEDURE — 80048 BASIC METABOLIC PNL TOTAL CA: CPT

## 2019-03-22 PROCEDURE — 770001 HCHG ROOM/CARE - MED/SURG/GYN PRIV*

## 2019-03-22 PROCEDURE — L4398 FOOT DROP SPLINT PRE OTS: HCPCS

## 2019-03-22 PROCEDURE — 36415 COLL VENOUS BLD VENIPUNCTURE: CPT

## 2019-03-22 PROCEDURE — 97535 SELF CARE MNGMENT TRAINING: CPT

## 2019-03-22 PROCEDURE — A9270 NON-COVERED ITEM OR SERVICE: HCPCS | Performed by: ORTHOPAEDIC SURGERY

## 2019-03-22 PROCEDURE — 97110 THERAPEUTIC EXERCISES: CPT

## 2019-03-22 PROCEDURE — 71045 X-RAY EXAM CHEST 1 VIEW: CPT

## 2019-03-22 RX ADMIN — ACETAMINOPHEN 650 MG: 325 TABLET, FILM COATED ORAL at 16:21

## 2019-03-22 RX ADMIN — OXYCODONE HYDROCHLORIDE 5 MG: 5 TABLET ORAL at 02:41

## 2019-03-22 RX ADMIN — ACETAMINOPHEN 650 MG: 325 TABLET, FILM COATED ORAL at 01:41

## 2019-03-22 RX ADMIN — METAXALONE 800 MG: 800 TABLET ORAL at 12:22

## 2019-03-22 RX ADMIN — METAXALONE 800 MG: 800 TABLET ORAL at 05:43

## 2019-03-22 RX ADMIN — ACETAMINOPHEN 650 MG: 325 TABLET, FILM COATED ORAL at 21:29

## 2019-03-22 RX ADMIN — ACETAMINOPHEN 650 MG: 325 TABLET, FILM COATED ORAL at 05:42

## 2019-03-22 RX ADMIN — METAXALONE 800 MG: 800 TABLET ORAL at 16:21

## 2019-03-22 RX ADMIN — ACETAMINOPHEN 650 MG: 325 TABLET, FILM COATED ORAL at 12:22

## 2019-03-22 ASSESSMENT — ENCOUNTER SYMPTOMS
PALPITATIONS: 0
FEVER: 0
NAUSEA: 0
DIZZINESS: 0
CHILLS: 0
SHORTNESS OF BREATH: 0
COUGH: 0
VOMITING: 0
DIARRHEA: 0
BACK PAIN: 1
MYALGIAS: 1
HEADACHES: 0

## 2019-03-22 ASSESSMENT — COGNITIVE AND FUNCTIONAL STATUS - GENERAL
DRESSING REGULAR LOWER BODY CLOTHING: A LOT
DRESSING REGULAR UPPER BODY CLOTHING: A LOT
CLIMB 3 TO 5 STEPS WITH RAILING: TOTAL
DAILY ACTIVITIY SCORE: 15
PERSONAL GROOMING: A LITTLE
MOBILITY SCORE: 10
MOVING FROM LYING ON BACK TO SITTING ON SIDE OF FLAT BED: UNABLE
MOVING TO AND FROM BED TO CHAIR: UNABLE
WALKING IN HOSPITAL ROOM: A LITTLE
TOILETING: A LOT
HELP NEEDED FOR BATHING: A LOT
SUGGESTED CMS G CODE MODIFIER DAILY ACTIVITY: CK
SUGGESTED CMS G CODE MODIFIER MOBILITY: CL
TURNING FROM BACK TO SIDE WHILE IN FLAT BAD: UNABLE
STANDING UP FROM CHAIR USING ARMS: A LITTLE

## 2019-03-22 ASSESSMENT — GAIT ASSESSMENTS
ASSISTIVE DEVICE: FRONT WHEEL WALKER
DISTANCE (FEET): 20
DEVIATION: STEP TO;BRADYKINETIC;OTHER (COMMENT)
GAIT LEVEL OF ASSIST: MINIMAL ASSIST

## 2019-03-22 NOTE — DISCHARGE PLANNING
Care Transition Team Assessment    Information Source  Orientation : Oriented x 4  Information Given By: Patient  Who is responsible for making decisions for patient? : Patient         Elopement Risk  Legal Hold: No  Ambulatory or Self Mobile in Wheelchair: Yes  Disoriented: No  Psychiatric Symptoms: None  History of Wandering: No  Elopement this Admit: No  Vocalizing Wanting to Leave: No  Displays Behaviors, Body Language Wanting to Leave: No-Not at Risk for Elopement  Elopement Risk: Not at Risk for Elopement    Interdisciplinary Discharge Planning  Does Admitting Nurse Feel This Could be a Complex Discharge?: No  Primary Care Physician: Lynnette Giles [facility]  Lives with - Patient's Self Care Capacity: Spouse  Patient or legal guardian wants to designate a caregiver (see row info): No  Support Systems: Family Member(s), Friends / Neighbors, Spouse / Significant Other  Housing / Facility: 2 Story House (has a bathroom on first floor. )  Do You Take your Prescribed Medications Regularly: No (does not have prescriptions)  Able to Return to Previous ADL's: Future Time w/Therapy  Mobility Issues: Yes  Prior Services: None  Patient Expects to be Discharged to:: IRF  Assistance Needed: Yes  Durable Medical Equipment: Not Applicable    Discharge Preparedness  What is your plan after discharge?: Uncertain - pending medical team collaboration  What are your discharge supports?: Spouse, Other (comment) (friends and family)  Prior Functional Level: Ambulatory, Drives Self, Independent with Activities of Daily Living    Functional Assesment  Prior Functional Level: Ambulatory, Drives Self, Independent with Activities of Daily Living    Finances  Financial Barriers to Discharge: Yes  Prescription Coverage: Yes    Vision / Hearing Impairment  Vision Impairment : Yes (reading glasses)  Right Eye Vision: Wears Glasses  Left Eye Vision: Wears Glasses  Hearing Impairment : No              Domestic Abuse  Have you ever been the  victim of abuse or violence?: No  Verbal Abuse or Emotional Abuse: No              Anticipated Discharge Information  Anticipated discharge disposition: Home

## 2019-03-22 NOTE — PROGRESS NOTES
2 RN Skin check complete    Prevena drain to posterior lumbar spine, site CDI. Bruising to right posterior flank. Unable to visualize surgical site on right hip, old drainage on dressing. Right knee wrapped with kerlix, unable to visualize past traction site.     All documented wounds assessed. See integumentary/wound flowsheets for documentation. All wound prevention protocols in place.
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
## 2019-03-22 NOTE — PROGRESS NOTES
Pt explained that while in the ICU he used to wear a foot brace on his right foot as needed. This RN searched room, did not find it. Did not see it when pt arrived. Called SICU and spoke to charge nurse, they will look for it.

## 2019-03-22 NOTE — PROGRESS NOTES
Surgery patient?: Yes  Date of surgery: 3/19  Ambulated 50 ft on day of surgery? (N/A if today is not date of surgery): Unknown  Number of times ambulated 50 feet or greater today: Unknown  Patient has been up to chair, edge of bed or HOB 90 degrees for all meals?: Unknown  Goal met? (goal is ambulating at least 50 feet 2 times on day shift, one time on night shift): Yes  If patient did not meet mobility goal, why?:

## 2019-03-22 NOTE — PROGRESS NOTES
Neurosurgery Progress Note    Subjective:  Doing well. Axial LBP. Slow improvement in RLE foot drop. Ambulating with assistance d/t right hip ORIF. No new symptoms.    Exam:  VSS  A&Ox4, NAD  NM: 5/5 hip flexion Right, left is SP ORIF, knee extension, knee flexion, plantar flexion, EHL  dorsiflexion left 5/5 right 3/5  Sensation intact and equal throughout all four extremities. Mild dysesthesia left first dorsal webspace   Abdomen: soft, non-tender  Non-labored breathing on room air, normal respiratory effort  Capillary refill in all four extremities <2 seconds  No LE edema, erythema, cyanosis, clubbing  Calves non-tender to compression bilat  Incision CDI, no halo sign  Drain: d/c    BP  Min: 139/89  Max: 148/77  Pulse  Av.7  Min: 78  Max: 104  Resp  Av.8  Min: 5  Max: 39  Temp  Av.2 °C (99 °F)  Min: 36.8 °C (98.3 °F)  Max: 37.3 °C (99.2 °F)  SpO2  Av %  Min: 84 %  Max: 98 %    No Data Recorded    Recent Labs      19   0455  19   0600  19   0435   WBC  7.4  6.4  6.2   RBC  2.73*  2.66*  2.71*   HEMOGLOBIN  9.0*  8.7*  8.6*   HEMATOCRIT  25.2*  24.7*  24.9*   MCV  92.3  92.9  91.9   MCH  33.0  32.7  31.7   MCHC  35.7*  35.2  34.5   RDW  41.1  42.4  40.6   PLATELETCT  203  224  266   MPV  10.5  9.6  9.7     Recent Labs      19   0430  19   0600  19   0435   SODIUM  135  136  136   POTASSIUM  4.3  3.2*  3.4*   CHLORIDE  105  103  103   CO2  22  28  27   GLUCOSE  116*  107*  108*   BUN  9  9  9   CREATININE  0.65  0.56  0.59   CALCIUM  7.6*  8.0*  8.0*               Intake/Output       19 07 - 19 0659 19 07 - 19 0659      [id number] Total  Total       Intake    Total Intake -- -- -- -- -- --       Output    Urine  1800  --  -- --    Number of Times Voided 2 x -- 2 x -- -- --    Urine Void (mL) 1800 -- -- --    Drains  18  -- 18  --  -- --    Output (mL) ([REMOVED] Closed/Suction Drain 1  Posterior Back Hemovac) 18 -- 18 -- -- --    Stool  --  -- --  --  -- --    Number of Times Stooled 1 x 1 x 2 x -- -- --    Total Output 1818 -- 1818 -- -- --       Net I/O     -1818 -- -1818 -- -- --            Intake/Output Summary (Last 24 hours) at 03/22/19 0843  Last data filed at 03/21/19 1600   Gross per 24 hour   Intake                0 ml   Output              618 ml   Net             -618 ml            • metaxalone  800 mg TID   • Pharmacy Consult Request  1 Each PHARMACY TO DOSE   • ondansetron  4 mg Q4HRS PRN   • dexamethasone  4 mg Once PRN   • diphenhydrAMINE  25 mg Q6HRS PRN   • haloperidol lactate  1 mg Q6HRS PRN   • scopolamine  1 Patch Q72HRS PRN   • docusate sodium  100 mg BID   • senna-docusate  1 Tab Nightly   • senna-docusate  1 Tab Q24HRS PRN   • polyethylene glycol/lytes  1 Packet BID PRN   • magnesium hydroxide  30 mL QDAY PRN   • bisacodyl  10 mg Q24HRS PRN   • fleet  1 Each Once PRN   • acetaminophen  650 mg Q6HRS   • oxyCODONE immediate-release  5 mg Q3HRS PRN   • oxyCODONE immediate release  10 mg Q3HRS PRN   • Respiratory Care per Protocol   Continuous RT   • morphine injection  4 mg Q3HRS PRN   • acetaminophen  650 mg Q4HRS PRN    Or   • acetaminophen  650 mg Q4HRS PRN       Assessment and Plan:  Hospital day #6  POD #3 Right L4-5 hemilaminectomy, L4-S1 TLIF   Prophylactic anticoagulation: yes         Start date/time: 24 hrs post drain d/c  Prevena incisional management in place, no output, not alarming.  Ambulate, work with therapies  Continue incentive spirometry Q1H  Continue pain control  Continue stool softeners to encourage BM  Right foot drop improved   Patient agrees with treatment plan.   Case discussed with Dr. Muñoz.

## 2019-03-22 NOTE — PROGRESS NOTES
19:35 Pt arrived to floor via transport. Assumed care of pt. Pt A&Ox4. Denies tingling, numbness in right leg (baseline), pain 3/10. Call light within reach. Bed alarm on. SCDs in use. Pt educated to call for assistance. Bed locked and in lowest position.
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
## 2019-03-22 NOTE — PROGRESS NOTES
2 RN Skin Check complete    Prevena wound vac in place to posterior lumbar spine. Site clean, dry, and intact. Bruising to right posterior flank. Surgical site on right hip covered with dressing, unable to assess. Right knee wrapped with kerlix, unable to assess. Wounds documented in integumentary/wound flow sheets.

## 2019-03-22 NOTE — PROGRESS NOTES
Received report from NOC RN, all questions answered, call light within reach, bed locked and in lowest position, bed alarm on. Hourly rounding in place.

## 2019-03-22 NOTE — PROGRESS NOTES
POD 4 ORIF R acetabulum  TTWB RLE 10 weeks  PT/OT when able  Pain control  SCDs  Equinus boot for foot drop

## 2019-03-22 NOTE — THERAPY
"Physical Therapy Treatment completed.   Bed Mobility:  Supine to Sit: Maximal Assist  Transfers: Sit to Stand: Minimal Assist  Gait: Level Of Assist: Minimal Assist with Front-Wheel Walker       Plan of Care: Will benefit from Physical Therapy 4 times per week  Discharge Recommendations: Equipment: Will Continue to Assess for Equipment Needs. Post-acute therapy Recommend inpatient transitional care services for continued physical therapy services.      See \"Rehab Therapy-Acute\" Patient Summary Report for complete documentation.     Pt continues to progress w/ therapy. Pt has the most difficulty w/ bed mobility d/t pain. Pt continues to present w/ decreased DF on the R. He is 10 degrees short of neutral w/ active contractions gravity eliminated but is unable to maintain once OOB. Pt demonstrates good UE strength to compensate for LE weakness. Pt pulling heavily on FWW to get to standing. He is able to maintain TTWB during ambulation but fatigues quickly. Pt is very motivated to participate and would benefit from post acute therapy.  "

## 2019-03-22 NOTE — DISCHARGE PLANNING
Dr. Ny is recommending RIRF.  Spoke with Veronika, spouse regarding RIRF and financial obligations-she is agreeable.  Submitted to insurance.

## 2019-03-23 ENCOUNTER — APPOINTMENT (OUTPATIENT)
Dept: RADIOLOGY | Facility: MEDICAL CENTER | Age: 59
DRG: 958 | End: 2019-03-23
Attending: NURSE PRACTITIONER
Payer: COMMERCIAL

## 2019-03-23 PROBLEM — Z78.9 NO CONTRAINDICATION TO DEEP VEIN THROMBOSIS (DVT) PROPHYLAXIS: Status: ACTIVE | Noted: 2019-03-16

## 2019-03-23 LAB
ANION GAP SERPL CALC-SCNC: 9 MMOL/L (ref 0–11.9)
BUN SERPL-MCNC: 10 MG/DL (ref 8–22)
CALCIUM SERPL-MCNC: 8.2 MG/DL (ref 8.5–10.5)
CHLORIDE SERPL-SCNC: 106 MMOL/L (ref 96–112)
CO2 SERPL-SCNC: 25 MMOL/L (ref 20–33)
CREAT SERPL-MCNC: 0.62 MG/DL (ref 0.5–1.4)
GLUCOSE SERPL-MCNC: 113 MG/DL (ref 65–99)
POTASSIUM SERPL-SCNC: 3.5 MMOL/L (ref 3.6–5.5)
SODIUM SERPL-SCNC: 140 MMOL/L (ref 135–145)

## 2019-03-23 PROCEDURE — 700102 HCHG RX REV CODE 250 W/ 637 OVERRIDE(OP): Performed by: ORTHOPAEDIC SURGERY

## 2019-03-23 PROCEDURE — 36415 COLL VENOUS BLD VENIPUNCTURE: CPT

## 2019-03-23 PROCEDURE — 700102 HCHG RX REV CODE 250 W/ 637 OVERRIDE(OP): Performed by: NURSE PRACTITIONER

## 2019-03-23 PROCEDURE — A9270 NON-COVERED ITEM OR SERVICE: HCPCS | Performed by: ORTHOPAEDIC SURGERY

## 2019-03-23 PROCEDURE — 80048 BASIC METABOLIC PNL TOTAL CA: CPT

## 2019-03-23 PROCEDURE — 71045 X-RAY EXAM CHEST 1 VIEW: CPT

## 2019-03-23 PROCEDURE — 700111 HCHG RX REV CODE 636 W/ 250 OVERRIDE (IP): Performed by: NURSE PRACTITIONER

## 2019-03-23 PROCEDURE — A9270 NON-COVERED ITEM OR SERVICE: HCPCS | Performed by: NURSE PRACTITIONER

## 2019-03-23 PROCEDURE — 770001 HCHG ROOM/CARE - MED/SURG/GYN PRIV*

## 2019-03-23 RX ADMIN — METAXALONE 800 MG: 800 TABLET ORAL at 05:04

## 2019-03-23 RX ADMIN — ACETAMINOPHEN 650 MG: 325 TABLET, FILM COATED ORAL at 17:40

## 2019-03-23 RX ADMIN — ENOXAPARIN SODIUM 30 MG: 100 INJECTION SUBCUTANEOUS at 12:36

## 2019-03-23 RX ADMIN — ACETAMINOPHEN 650 MG: 325 TABLET, FILM COATED ORAL at 00:05

## 2019-03-23 RX ADMIN — METAXALONE 800 MG: 800 TABLET ORAL at 17:40

## 2019-03-23 RX ADMIN — ACETAMINOPHEN 650 MG: 325 TABLET, FILM COATED ORAL at 05:04

## 2019-03-23 RX ADMIN — METAXALONE 800 MG: 800 TABLET ORAL at 12:35

## 2019-03-23 RX ADMIN — ACETAMINOPHEN 650 MG: 325 TABLET, FILM COATED ORAL at 12:35

## 2019-03-23 ASSESSMENT — ENCOUNTER SYMPTOMS
BACK PAIN: 1
PALPITATIONS: 0
DIZZINESS: 0
HEADACHES: 0
DIARRHEA: 0
CHILLS: 0
VOMITING: 0
COUGH: 0
FEVER: 0
SHORTNESS OF BREATH: 0
NAUSEA: 0
MYALGIAS: 1

## 2019-03-23 NOTE — THERAPY
"Occupational Therapy Treatment completed with focus on ADLs, ADL transfers and patient education.  Functional Status:  Pt seen for OT tx today, pt was provided with extensive education regarding need to maintain TTWB precautions, spinal precautions and monitoring s/s of fatigue as pt attempts to do too much, does not realize when he's starting to fatigue and thus putting more weight onto RLE, trained on proper body mechanics and techniques to maintain TTWB during ADL mobility and safety. Pt performed bed mobility with min to mod a primarily assist for RLE and with cues able to bring trunk upright from a raised hob, seated eob oral care with supervision after s/u, LB dressing max a, ambulated to BR with min a and 1 seated rest break around the bed due to fatigue, toilet t/f with min a and toileting mod a for clothing and line management, able to perform pericare with min a and cues for maintaining neutral spine. Pt will continue to benefit from acute skilled OT services, receptive of education and will benefit from post acute therapy prior to d/c home.   Plan of Care: Will benefit from Occupational Therapy 4 times per week  Discharge Recommendations:  Equipment Will Continue to Assess for Equipment Needs. Post-acute therapy Recommend inpatient transitional care services for continued occupational therapy services.       See \"Rehab Therapy-Acute\" Patient Summary Report for complete documentation.   "
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
## 2019-03-23 NOTE — PROGRESS NOTES
2115 Assumed care of pt. Pt A&Ox4. Numbness and tingling in RLE (baseline). Pain 2/10, medicated per MAR. Call light within reach. Bed alarm on. Pt educated to call for assistance. SCDs refused at this time, education provided. Bed locked and in lowest position. Treaded socks in place.

## 2019-03-23 NOTE — PROGRESS NOTES
"Seen and examined, s/p R acetabulum ORIF.  Doing OK, some pain but not severe.    Blood pressure 153/94, pulse 100, temperature 36.7 °C (98.1 °F), temperature source Temporal, resp. rate 18, height 1.778 m (5' 10\"), weight 84.1 kg (185 lb 6.5 oz), SpO2 95 %.      Exam:  RLE:  Dressing with minimal serous drainage on original operative dressing.  Some return of toe dorsiflexion on right, 2/5, DP/SP sensation improving.    #1 Right acetabulum ORIF  #2 Right sciatic nerve palsy secondary to acetabulum fracture dislocation    Plan:    Activity:  TTWB, PT today.  PRAFO for foot drop.  PT to work on ankle/foot ROM as well.  ABX:  None required for ortho  DVT:  SCD's, Lovenox or per trauma  Dispo:  D/C planning  "

## 2019-03-23 NOTE — CARE PLAN
Problem: Safety  Goal: Will remain free from injury  Outcome: PROGRESSING AS EXPECTED      Problem: Bowel/Gastric:  Goal: Will not experience complications related to bowel motility  Outcome: PROGRESSING AS EXPECTED

## 2019-03-24 ENCOUNTER — APPOINTMENT (OUTPATIENT)
Dept: RADIOLOGY | Facility: MEDICAL CENTER | Age: 59
DRG: 958 | End: 2019-03-24
Attending: NURSE PRACTITIONER
Payer: COMMERCIAL

## 2019-03-24 LAB
ANION GAP SERPL CALC-SCNC: 9 MMOL/L (ref 0–11.9)
BUN SERPL-MCNC: 12 MG/DL (ref 8–22)
CALCIUM SERPL-MCNC: 8.2 MG/DL (ref 8.5–10.5)
CHLORIDE SERPL-SCNC: 104 MMOL/L (ref 96–112)
CO2 SERPL-SCNC: 24 MMOL/L (ref 20–33)
CREAT SERPL-MCNC: 0.68 MG/DL (ref 0.5–1.4)
GLUCOSE SERPL-MCNC: 112 MG/DL (ref 65–99)
POTASSIUM SERPL-SCNC: 3.5 MMOL/L (ref 3.6–5.5)
SODIUM SERPL-SCNC: 137 MMOL/L (ref 135–145)

## 2019-03-24 PROCEDURE — 700111 HCHG RX REV CODE 636 W/ 250 OVERRIDE (IP): Performed by: NURSE PRACTITIONER

## 2019-03-24 PROCEDURE — 36415 COLL VENOUS BLD VENIPUNCTURE: CPT

## 2019-03-24 PROCEDURE — A9270 NON-COVERED ITEM OR SERVICE: HCPCS | Performed by: NURSE PRACTITIONER

## 2019-03-24 PROCEDURE — 770001 HCHG ROOM/CARE - MED/SURG/GYN PRIV*

## 2019-03-24 PROCEDURE — 71045 X-RAY EXAM CHEST 1 VIEW: CPT

## 2019-03-24 PROCEDURE — 93970 EXTREMITY STUDY: CPT

## 2019-03-24 PROCEDURE — 93970 EXTREMITY STUDY: CPT | Mod: 26 | Performed by: SURGERY

## 2019-03-24 PROCEDURE — 700102 HCHG RX REV CODE 250 W/ 637 OVERRIDE(OP): Performed by: NURSE PRACTITIONER

## 2019-03-24 PROCEDURE — 80048 BASIC METABOLIC PNL TOTAL CA: CPT

## 2019-03-24 RX ADMIN — METAXALONE 800 MG: 800 TABLET ORAL at 04:59

## 2019-03-24 RX ADMIN — METAXALONE 800 MG: 800 TABLET ORAL at 17:45

## 2019-03-24 RX ADMIN — ENOXAPARIN SODIUM 30 MG: 100 INJECTION SUBCUTANEOUS at 04:59

## 2019-03-24 RX ADMIN — ENOXAPARIN SODIUM 30 MG: 100 INJECTION SUBCUTANEOUS at 17:45

## 2019-03-24 RX ADMIN — METAXALONE 800 MG: 800 TABLET ORAL at 11:43

## 2019-03-24 ASSESSMENT — ENCOUNTER SYMPTOMS
SHORTNESS OF BREATH: 0
CHILLS: 0
BACK PAIN: 1
NAUSEA: 0
VOMITING: 0
MYALGIAS: 1
HEADACHES: 0
PALPITATIONS: 0
DIZZINESS: 0
DIARRHEA: 0
FEVER: 0
COUGH: 0

## 2019-03-24 NOTE — CARE PLAN
Problem: Bowel/Gastric:  Goal: Normal bowel function is maintained or improved  Outcome: PROGRESSING AS EXPECTED  Loose stools, but pt states they are becoming more regular.    Problem: Mobility  Goal: Risk for activity intolerance will decrease  Outcome: PROGRESSING AS EXPECTED  Pt tolerating walking to the bathroom with FWW and TTWB on RLE.  Tolerates well and stable gait.

## 2019-03-24 NOTE — PROGRESS NOTES
Neurosurgery Progress Note    Subjective:  Doing well. Axial LBP. Slow continued improvement in RLE foot drop. Ambulating with assistance d/t right hip ORIF. No new symptoms. Patient states plan from Ortho is d/c to rehab for right hip then home. No recent need for pain meds.     Exam:  VSS  A&Ox4, NAD  NM: 5/5 hip flexion Right, left is SP ORIF, knee extension, knee flexion, plantar flexion, EHL  dorsiflexion left 5/5 right 3/5  Sensation intact and equal throughout all four extremities. Mild dysesthesia left first dorsal webspace   Abdomen: soft, non-tender  Non-labored breathing on room air, normal respiratory effort  Capillary refill in all four extremities <2 seconds  No LE edema, erythema, cyanosis, clubbing  Calves non-tender to compression bilat  Prevena dressing CDI, no output not alarming  Drain: d/c    BP  Min: 139/83  Max: 151/93  Pulse  Av  Min: 84  Max: 90  Resp  Av.3  Min: 16  Max: 18  Temp  Av °C (98.6 °F)  Min: 36.8 °C (98.2 °F)  Max: 37.2 °C (98.9 °F)  SpO2  Av %  Min: 92 %  Max: 96 %    No Data Recorded    Recent Labs      195   WBC  6.2   RBC  2.71*   HEMOGLOBIN  8.6*   HEMATOCRIT  24.9*   MCV  91.9   MCH  31.7   MCHC  34.5   RDW  40.6   PLATELETCT  266   MPV  9.7     Recent Labs      19   0435  19   0405  19   0240   SODIUM  136  140  137   POTASSIUM  3.4*  3.5*  3.5*   CHLORIDE  103  106  104   CO2  27  25  24   GLUCOSE  108*  113*  112*   BUN  9  10  12   CREATININE  0.59  0.62  0.68   CALCIUM  8.0*  8.2*  8.2*               Intake/Output       19 - 19 0659 19 - 1959       Total 1900-0659 Total       Intake    P.O.  --  100 100  --  -- --    P.O. -- 100 100 -- -- --    Total Intake -- 100 100 -- -- --       Output    Stool  --  -- --  --  -- --    Number of Times Stooled -- 1 x 1 x -- -- --    Total Output -- -- -- -- -- --       Net I/O     -- 100 100 -- -- --             Intake/Output Summary (Last 24 hours) at 03/24/19 0919  Last data filed at 03/23/19 2038   Gross per 24 hour   Intake              100 ml   Output                0 ml   Net              100 ml            • enoxaparin (LOVENOX) injection  30 mg Q12HRS   • metaxalone  800 mg TID   • Pharmacy Consult Request  1 Each PHARMACY TO DOSE   • ondansetron  4 mg Q4HRS PRN   • dexamethasone  4 mg Once PRN   • diphenhydrAMINE  25 mg Q6HRS PRN   • haloperidol lactate  1 mg Q6HRS PRN   • scopolamine  1 Patch Q72HRS PRN   • docusate sodium  100 mg BID   • senna-docusate  1 Tab Nightly   • senna-docusate  1 Tab Q24HRS PRN   • polyethylene glycol/lytes  1 Packet BID PRN   • magnesium hydroxide  30 mL QDAY PRN   • bisacodyl  10 mg Q24HRS PRN   • fleet  1 Each Once PRN   • oxyCODONE immediate-release  5 mg Q3HRS PRN   • oxyCODONE immediate release  10 mg Q3HRS PRN   • Respiratory Care per Protocol   Continuous RT   • morphine injection  4 mg Q3HRS PRN   • acetaminophen  650 mg Q4HRS PRN    Or   • acetaminophen  650 mg Q4HRS PRN       Assessment and Plan:  Hospital day #7  POD #4 Right L4-5 hemilaminectomy, L4-S1 TLIF   Prophylactic anticoagulation: yes         Start date/time: 24 hrs post drain d/c per hospitalist  Prevena incisional management in place, no output, not alarming device designed to be d/c after 7 days.  Ambulate, work with therapies  Continue incentive spirometry Q1H  Continue pain control  Right foot drop improved  Ok from NSG perspective for d/c to home or rehab (with outpatient follow up in our clinic) when medically cleared. Pt. Awaiting rehab placement.  Patient agrees with treatment plan.   Case discussed with Dr. Muñoz.
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
## 2019-03-24 NOTE — PROGRESS NOTES
Received report and assumed pt care.  A&O x4.  Bed alarm and treaded socks on.  Call light and personal belongings within reach.  Bed locked and at lowest position.  BARBOUR.  VSS.  Prevena wound vac in use.

## 2019-03-25 ENCOUNTER — APPOINTMENT (OUTPATIENT)
Dept: RADIOLOGY | Facility: MEDICAL CENTER | Age: 59
DRG: 958 | End: 2019-03-25
Attending: NURSE PRACTITIONER
Payer: COMMERCIAL

## 2019-03-25 PROCEDURE — A9270 NON-COVERED ITEM OR SERVICE: HCPCS | Performed by: NURSE PRACTITIONER

## 2019-03-25 PROCEDURE — 700111 HCHG RX REV CODE 636 W/ 250 OVERRIDE (IP): Performed by: NURSE PRACTITIONER

## 2019-03-25 PROCEDURE — 97116 GAIT TRAINING THERAPY: CPT

## 2019-03-25 PROCEDURE — 770001 HCHG ROOM/CARE - MED/SURG/GYN PRIV*

## 2019-03-25 PROCEDURE — 97535 SELF CARE MNGMENT TRAINING: CPT

## 2019-03-25 PROCEDURE — 97530 THERAPEUTIC ACTIVITIES: CPT

## 2019-03-25 PROCEDURE — 97110 THERAPEUTIC EXERCISES: CPT

## 2019-03-25 PROCEDURE — 700102 HCHG RX REV CODE 250 W/ 637 OVERRIDE(OP): Performed by: NURSE PRACTITIONER

## 2019-03-25 PROCEDURE — 71045 X-RAY EXAM CHEST 1 VIEW: CPT

## 2019-03-25 RX ADMIN — METAXALONE 800 MG: 800 TABLET ORAL at 17:08

## 2019-03-25 RX ADMIN — ENOXAPARIN SODIUM 30 MG: 100 INJECTION SUBCUTANEOUS at 17:08

## 2019-03-25 RX ADMIN — METAXALONE 800 MG: 800 TABLET ORAL at 12:14

## 2019-03-25 RX ADMIN — METAXALONE 800 MG: 800 TABLET ORAL at 05:38

## 2019-03-25 RX ADMIN — ENOXAPARIN SODIUM 30 MG: 100 INJECTION SUBCUTANEOUS at 05:38

## 2019-03-25 ASSESSMENT — COGNITIVE AND FUNCTIONAL STATUS - GENERAL
SUGGESTED CMS G CODE MODIFIER DAILY ACTIVITY: CK
CLIMB 3 TO 5 STEPS WITH RAILING: A LOT
HELP NEEDED FOR BATHING: A LOT
MOVING FROM LYING ON BACK TO SITTING ON SIDE OF FLAT BED: A LITTLE
PERSONAL GROOMING: A LITTLE
WALKING IN HOSPITAL ROOM: A LITTLE
DRESSING REGULAR UPPER BODY CLOTHING: A LITTLE
MOVING TO AND FROM BED TO CHAIR: UNABLE
DRESSING REGULAR LOWER BODY CLOTHING: A LOT
MOBILITY SCORE: 13
TOILETING: A LITTLE
TURNING FROM BACK TO SIDE WHILE IN FLAT BAD: UNABLE
DAILY ACTIVITIY SCORE: 17
SUGGESTED CMS G CODE MODIFIER MOBILITY: CL
STANDING UP FROM CHAIR USING ARMS: A LITTLE

## 2019-03-25 ASSESSMENT — ENCOUNTER SYMPTOMS
HEADACHES: 0
DIARRHEA: 0
SHORTNESS OF BREATH: 0
DIZZINESS: 0
MYALGIAS: 1
FEVER: 0
BACK PAIN: 1
PALPITATIONS: 0
COUGH: 0
VOMITING: 0
NAUSEA: 0
CHILLS: 0

## 2019-03-25 ASSESSMENT — PATIENT HEALTH QUESTIONNAIRE - PHQ9
2. FEELING DOWN, DEPRESSED, IRRITABLE, OR HOPELESS: NOT AT ALL
SUM OF ALL RESPONSES TO PHQ9 QUESTIONS 1 AND 2: 0
1. LITTLE INTEREST OR PLEASURE IN DOING THINGS: NOT AT ALL

## 2019-03-25 ASSESSMENT — GAIT ASSESSMENTS
DEVIATION: STEP TO;BRADYKINETIC
GAIT LEVEL OF ASSIST: MINIMAL ASSIST
DISTANCE (FEET): 35
ASSISTIVE DEVICE: FRONT WHEEL WALKER

## 2019-03-25 NOTE — THERAPY
"Occupational Therapy Treatment completed with focus on ADLs, ADL transfers and patient education.  Functional Status:  Min A for supine>sit and sit>supine; CGA for functional transfers; SPV for grooming while standing; min A to don TLSO  Plan of Care: Will benefit from Occupational Therapy 4 times per week  Discharge Recommendations:  Equipment Will Continue to Assess for Equipment Needs. Recommend home health or outpatient transitional care services for continued occupational therapy services    See \"Rehab Therapy-Acute\" Patient Summary Report for complete documentation.     Pt participated in OT tx session. Pt educated on donning/doffing TLSO and was able to demonstrate with min A. Pt educated on DME/AE available to increase independence with BADLs. Pt reports he will be acquiring BSC to put over toilet in home for raised surface. Pt demonstrate strategies for logroll with sit>supine. Pt reports [facility] will be able to provide assist in home setting. Pt progressing with activity tolerance for BADLs/functional transfers within room. Will continue to follow for acute OT services while in-house.   "

## 2019-03-25 NOTE — DISCHARGE PLANNING
Anticipated Discharge Disposition:   [facility]    Action:   Chart review.  LVM rom Perez at [facility]  Talked to Dr. Topete-they are down a PT.     Barriers to Discharge:   auth is still pending.     Plan:   Follow up with [facility] tomorrow.
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
## 2019-03-25 NOTE — CARE PLAN
Problem: Skin Integrity  Goal: Risk for impaired skin integrity will decrease  Outcome: PROGRESSING AS EXPECTED  Skin to right  hip/med thigh dressing intact, monitor for s/sx of infection, wound vac intact to back, TLSO if mobility greater than 5 minutes,  Foot drop immobilizer to right lower foot.

## 2019-03-25 NOTE — PROGRESS NOTES
Neurosurgery Progress Note    Subjective:  Doing well. Axial LBP, mostly stiffness and soreness at this point. Slow continued improvement in RLE foot drop. Anxiously awaiting d/c to rehab for right hip then home. No recent need for pain meds.        Exam:  VSS  A&Ox4, NAD  NM: 5/5 hip flexion Right, left is SP ORIF, knee extension, knee flexion, plantar flexion, EHL  dorsiflexion left 5/5 right 3/5  Sensation intact and equal throughout all four extremities. Mild dysesthesia left first dorsal webspace   Abdomen: soft, non-tender  Non-labored breathing on room air, normal respiratory effort  Capillary refill in all four extremities <2 seconds  No LE edema, erythema, cyanosis, clubbing  Calves non-tender to compression bilat  Prevena dressing CDI, no output not alarming      BP  Min: 129/90  Max: 147/91  Pulse  Av.4  Min: 73  Max: 89  Resp  Av.9  Min: 16  Max: 18  Temp  Av.9 °C (98.4 °F)  Min: 36.2 °C (97.2 °F)  Max: 37.3 °C (99.2 °F)  SpO2  Av.1 %  Min: 93 %  Max: 98 %    No Data Recorded        Recent Labs      19   0405  19   0240   SODIUM  140  137   POTASSIUM  3.5*  3.5*   CHLORIDE  106  104   CO2  25  24   GLUCOSE  113*  112*   BUN  10  12   CREATININE  0.62  0.68   CALCIUM  8.2*  8.2*               Intake/Output       19 07 - 19 0659 19 07 - 19 0659       Total  Total       Intake    Total Intake -- -- -- -- -- --       Output    Urine  --  -- --  --  -- --    Number of Times Voided 1 x -- 1 x -- -- --    Stool  --  -- --  --  -- --    Number of Times Stooled 1 x -- 1 x -- -- --    Total Output -- -- -- -- -- --       Net I/O     -- -- -- -- -- --          No intake or output data in the 24 hours ending 19         • enoxaparin (LOVENOX) injection  30 mg Q12HRS   • metaxalone  800 mg TID   • Pharmacy Consult Request  1 Each PHARMACY TO DOSE   • ondansetron  4 mg Q4HRS PRN   • dexamethasone  4 mg Once PRN    • diphenhydrAMINE  25 mg Q6HRS PRN   • haloperidol lactate  1 mg Q6HRS PRN   • scopolamine  1 Patch Q72HRS PRN   • docusate sodium  100 mg BID   • senna-docusate  1 Tab Nightly   • senna-docusate  1 Tab Q24HRS PRN   • polyethylene glycol/lytes  1 Packet BID PRN   • magnesium hydroxide  30 mL QDAY PRN   • bisacodyl  10 mg Q24HRS PRN   • fleet  1 Each Once PRN   • oxyCODONE immediate-release  5 mg Q3HRS PRN   • oxyCODONE immediate release  10 mg Q3HRS PRN   • Respiratory Care per Protocol   Continuous RT   • morphine injection  4 mg Q3HRS PRN   • acetaminophen  650 mg Q4HRS PRN    Or   • acetaminophen  650 mg Q4HRS PRN       Assessment and Plan:  Hospital day #8  POD #5 Right L4-5 hemilaminectomy, L4-S1 TLIF   Prophylactic anticoagulation: yes         Start date/time: per hospitalist  Prevena incisional management in place, no output, not alarming device designed to be d/c after 7 days post op.  Ambulate, work with therapies  Continue incentive spirometry Q1H  Continue pain control  Right foot drop improved  Ok from NSG perspective for d/c to home or rehab (with outpatient follow up in our clinic) when medically cleared. Pt. Awaiting rehab placement.  Patient agrees with treatment plan.   Case discussed with Dr. Muñoz.

## 2019-03-25 NOTE — PROGRESS NOTES
Denies any pain, patient able to move, turn q 2 hours, minimal assist with transfer from bed to bathroom, uses walker.

## 2019-03-25 NOTE — PROGRESS NOTES
POD 7 ORIF R acetabulum   TTWB RLE 10 weeks  PT/OT  Pain control  SCD's  Equinus boot for foot drop

## 2019-03-25 NOTE — CARE PLAN
Problem: Pain Management  Goal: Pain level will decrease to patient's comfort goal  Outcome: PROGRESSING AS EXPECTED  PS down to 1, monitor for s/sx of pain or discomfort/breakthrough pain as needed.

## 2019-03-26 ENCOUNTER — APPOINTMENT (OUTPATIENT)
Dept: RADIOLOGY | Facility: MEDICAL CENTER | Age: 59
DRG: 958 | End: 2019-03-26
Attending: NURSE PRACTITIONER
Payer: COMMERCIAL

## 2019-03-26 ENCOUNTER — HOSPITAL ENCOUNTER (INPATIENT)
Facility: REHABILITATION | Age: 59
LOS: 6 days | DRG: 561 | End: 2019-04-01
Attending: PHYSICAL MEDICINE & REHABILITATION | Admitting: PHYSICAL MEDICINE & REHABILITATION
Payer: COMMERCIAL

## 2019-03-26 VITALS
WEIGHT: 170.86 LBS | TEMPERATURE: 97.6 F | BODY MASS INDEX: 24.46 KG/M2 | HEART RATE: 78 BPM | HEIGHT: 70 IN | SYSTOLIC BLOOD PRESSURE: 139 MMHG | OXYGEN SATURATION: 96 % | DIASTOLIC BLOOD PRESSURE: 83 MMHG | RESPIRATION RATE: 16 BRPM

## 2019-03-26 DIAGNOSIS — M10.071 IDIOPATHIC GOUT INVOLVING TOE OF RIGHT FOOT, UNSPECIFIED CHRONICITY: ICD-10-CM

## 2019-03-26 PROBLEM — Z78.9 NO CONTRAINDICATION TO DEEP VEIN THROMBOSIS (DVT) PROPHYLAXIS: Status: RESOLVED | Noted: 2019-03-16 | Resolved: 2019-03-26

## 2019-03-26 PROBLEM — M45.9 ANKYLOSING SPONDYLITIS (HCC): Status: ACTIVE | Noted: 2019-03-26

## 2019-03-26 PROBLEM — Z02.9 DISCHARGE PLANNING ISSUES: Status: RESOLVED | Noted: 2019-03-20 | Resolved: 2019-03-26

## 2019-03-26 PROCEDURE — A9270 NON-COVERED ITEM OR SERVICE: HCPCS | Performed by: NURSE PRACTITIONER

## 2019-03-26 PROCEDURE — 71045 X-RAY EXAM CHEST 1 VIEW: CPT

## 2019-03-26 PROCEDURE — 94760 N-INVAS EAR/PLS OXIMETRY 1: CPT

## 2019-03-26 PROCEDURE — 700102 HCHG RX REV CODE 250 W/ 637 OVERRIDE(OP): Performed by: NURSE PRACTITIONER

## 2019-03-26 PROCEDURE — 700111 HCHG RX REV CODE 636 W/ 250 OVERRIDE (IP): Performed by: NURSE PRACTITIONER

## 2019-03-26 PROCEDURE — A9270 NON-COVERED ITEM OR SERVICE: HCPCS | Performed by: PHYSICAL MEDICINE & REHABILITATION

## 2019-03-26 PROCEDURE — 770010 HCHG ROOM/CARE - REHAB SEMI PRIVAT*

## 2019-03-26 PROCEDURE — 99223 1ST HOSP IP/OBS HIGH 75: CPT | Performed by: PHYSICAL MEDICINE & REHABILITATION

## 2019-03-26 PROCEDURE — 700102 HCHG RX REV CODE 250 W/ 637 OVERRIDE(OP): Performed by: PHYSICAL MEDICINE & REHABILITATION

## 2019-03-26 PROCEDURE — 700112 HCHG RX REV CODE 229: Performed by: PHYSICAL MEDICINE & REHABILITATION

## 2019-03-26 PROCEDURE — 700111 HCHG RX REV CODE 636 W/ 250 OVERRIDE (IP): Performed by: PHYSICAL MEDICINE & REHABILITATION

## 2019-03-26 RX ORDER — ACETAMINOPHEN 325 MG/1
650 TABLET ORAL EVERY 4 HOURS PRN
Qty: 30 TAB | Refills: 0 | Status: ON HOLD
Start: 2019-03-26 | End: 2019-04-01

## 2019-03-26 RX ORDER — POLYETHYLENE GLYCOL 3350 17 G/17G
1 POWDER, FOR SOLUTION ORAL
Status: DISCONTINUED | OUTPATIENT
Start: 2019-03-26 | End: 2019-04-01 | Stop reason: HOSPADM

## 2019-03-26 RX ORDER — ONDANSETRON 4 MG/1
4 TABLET, ORALLY DISINTEGRATING ORAL 4 TIMES DAILY PRN
Status: DISCONTINUED | OUTPATIENT
Start: 2019-03-26 | End: 2019-04-01 | Stop reason: HOSPADM

## 2019-03-26 RX ORDER — AMOXICILLIN 250 MG
1 CAPSULE ORAL NIGHTLY
Status: DISCONTINUED | OUTPATIENT
Start: 2019-03-26 | End: 2019-03-26

## 2019-03-26 RX ORDER — POLYVINYL ALCOHOL 14 MG/ML
1 SOLUTION/ DROPS OPHTHALMIC PRN
Status: DISCONTINUED | OUTPATIENT
Start: 2019-03-26 | End: 2019-04-01 | Stop reason: HOSPADM

## 2019-03-26 RX ORDER — DOCUSATE SODIUM 100 MG/1
100 CAPSULE, LIQUID FILLED ORAL 2 TIMES DAILY
Status: DISCONTINUED | OUTPATIENT
Start: 2019-03-26 | End: 2019-04-01 | Stop reason: HOSPADM

## 2019-03-26 RX ORDER — AMOXICILLIN 250 MG
2 CAPSULE ORAL 2 TIMES DAILY
Status: DISCONTINUED | OUTPATIENT
Start: 2019-03-26 | End: 2019-04-01 | Stop reason: HOSPADM

## 2019-03-26 RX ORDER — BISACODYL 10 MG
10 SUPPOSITORY, RECTAL RECTAL
Status: DISCONTINUED | OUTPATIENT
Start: 2019-03-26 | End: 2019-04-01 | Stop reason: HOSPADM

## 2019-03-26 RX ORDER — METAXALONE 800 MG/1
800 TABLET ORAL 3 TIMES DAILY
Qty: 90 TAB | Status: ON HOLD
Start: 2019-03-26 | End: 2019-04-01

## 2019-03-26 RX ORDER — AMOXICILLIN 250 MG
1 CAPSULE ORAL NIGHTLY
Status: CANCELLED | OUTPATIENT
Start: 2019-03-26

## 2019-03-26 RX ORDER — DIPHENHYDRAMINE HYDROCHLORIDE 50 MG/ML
25 INJECTION INTRAMUSCULAR; INTRAVENOUS EVERY 6 HOURS PRN
Status: DISCONTINUED | OUTPATIENT
Start: 2019-03-26 | End: 2019-04-01 | Stop reason: HOSPADM

## 2019-03-26 RX ORDER — BISACODYL 10 MG
10 SUPPOSITORY, RECTAL RECTAL
Refills: 0 | Status: ON HOLD
Start: 2019-03-26 | End: 2019-04-01

## 2019-03-26 RX ORDER — OXYCODONE HYDROCHLORIDE 10 MG/1
10 TABLET ORAL
Status: DISCONTINUED | OUTPATIENT
Start: 2019-03-26 | End: 2019-04-01 | Stop reason: HOSPADM

## 2019-03-26 RX ORDER — SCOLOPAMINE TRANSDERMAL SYSTEM 1 MG/1
1 PATCH, EXTENDED RELEASE TRANSDERMAL
Status: CANCELLED | OUTPATIENT
Start: 2019-03-26

## 2019-03-26 RX ORDER — DOCUSATE SODIUM 100 MG/1
100 CAPSULE, LIQUID FILLED ORAL 2 TIMES DAILY
Status: CANCELLED | OUTPATIENT
Start: 2019-03-26

## 2019-03-26 RX ORDER — DIPHENHYDRAMINE HYDROCHLORIDE 50 MG/ML
25 INJECTION INTRAMUSCULAR; INTRAVENOUS EVERY 6 HOURS PRN
Status: CANCELLED | OUTPATIENT
Start: 2019-03-26

## 2019-03-26 RX ORDER — OXYCODONE HYDROCHLORIDE 10 MG/1
10 TABLET ORAL
Qty: 28 TAB | Status: ON HOLD
Start: 2019-03-26 | End: 2019-04-01

## 2019-03-26 RX ORDER — METHOCARBAMOL 750 MG/1
750 TABLET, FILM COATED ORAL 4 TIMES DAILY
Status: DISCONTINUED | OUTPATIENT
Start: 2019-03-26 | End: 2019-04-01 | Stop reason: HOSPADM

## 2019-03-26 RX ORDER — OXYCODONE HYDROCHLORIDE 5 MG/1
5 TABLET ORAL
Status: DISCONTINUED | OUTPATIENT
Start: 2019-03-26 | End: 2019-04-01 | Stop reason: HOSPADM

## 2019-03-26 RX ORDER — ONDANSETRON 2 MG/ML
4 INJECTION INTRAMUSCULAR; INTRAVENOUS 4 TIMES DAILY PRN
Status: DISCONTINUED | OUTPATIENT
Start: 2019-03-26 | End: 2019-04-01 | Stop reason: HOSPADM

## 2019-03-26 RX ORDER — TRAMADOL HYDROCHLORIDE 50 MG/1
50 TABLET ORAL EVERY 4 HOURS PRN
Status: DISCONTINUED | OUTPATIENT
Start: 2019-03-26 | End: 2019-04-01 | Stop reason: HOSPADM

## 2019-03-26 RX ORDER — SCOLOPAMINE TRANSDERMAL SYSTEM 1 MG/1
1 PATCH, EXTENDED RELEASE TRANSDERMAL
Status: DISCONTINUED | OUTPATIENT
Start: 2019-03-26 | End: 2019-04-01 | Stop reason: HOSPADM

## 2019-03-26 RX ORDER — PSEUDOEPHEDRINE HCL 30 MG
100 TABLET ORAL 2 TIMES DAILY
Qty: 60 CAP | Status: ON HOLD
Start: 2019-03-26 | End: 2019-04-01

## 2019-03-26 RX ORDER — OMEPRAZOLE 20 MG/1
20 CAPSULE, DELAYED RELEASE ORAL DAILY
Status: DISCONTINUED | OUTPATIENT
Start: 2019-03-27 | End: 2019-04-01 | Stop reason: HOSPADM

## 2019-03-26 RX ORDER — HYDRALAZINE HYDROCHLORIDE 25 MG/1
25 TABLET, FILM COATED ORAL EVERY 8 HOURS PRN
Status: DISCONTINUED | OUTPATIENT
Start: 2019-03-26 | End: 2019-04-01 | Stop reason: HOSPADM

## 2019-03-26 RX ORDER — TRAZODONE HYDROCHLORIDE 50 MG/1
50 TABLET ORAL
Status: DISCONTINUED | OUTPATIENT
Start: 2019-03-26 | End: 2019-04-01 | Stop reason: HOSPADM

## 2019-03-26 RX ORDER — ECHINACEA PURPUREA EXTRACT 125 MG
2 TABLET ORAL PRN
Status: DISCONTINUED | OUTPATIENT
Start: 2019-03-26 | End: 2019-04-01 | Stop reason: HOSPADM

## 2019-03-26 RX ORDER — OXYCODONE HYDROCHLORIDE 5 MG/1
5 TABLET ORAL
Qty: 30 TAB | Refills: 0 | Status: ON HOLD
Start: 2019-03-26 | End: 2019-04-01

## 2019-03-26 RX ORDER — ALUMINA, MAGNESIA, AND SIMETHICONE 2400; 2400; 240 MG/30ML; MG/30ML; MG/30ML
20 SUSPENSION ORAL
Status: DISCONTINUED | OUTPATIENT
Start: 2019-03-26 | End: 2019-04-01 | Stop reason: HOSPADM

## 2019-03-26 RX ORDER — ACETAMINOPHEN 325 MG/1
650 TABLET ORAL EVERY 4 HOURS PRN
Status: DISCONTINUED | OUTPATIENT
Start: 2019-03-26 | End: 2019-04-01 | Stop reason: HOSPADM

## 2019-03-26 RX ADMIN — DOCUSATE SODIUM 100 MG: 100 CAPSULE, LIQUID FILLED ORAL at 20:47

## 2019-03-26 RX ADMIN — ENOXAPARIN SODIUM 30 MG: 100 INJECTION SUBCUTANEOUS at 04:56

## 2019-03-26 RX ADMIN — METHOCARBAMOL 750 MG: 750 TABLET ORAL at 17:25

## 2019-03-26 RX ADMIN — ACETAMINOPHEN 650 MG: 325 TABLET, FILM COATED ORAL at 12:42

## 2019-03-26 RX ADMIN — SENNOSIDES AND DOCUSATE SODIUM 2 TABLET: 8.6; 5 TABLET ORAL at 20:47

## 2019-03-26 RX ADMIN — METAXALONE 800 MG: 800 TABLET ORAL at 04:57

## 2019-03-26 RX ADMIN — ENOXAPARIN SODIUM 30 MG: 100 INJECTION SUBCUTANEOUS at 20:47

## 2019-03-26 RX ADMIN — METHOCARBAMOL 750 MG: 750 TABLET ORAL at 20:47

## 2019-03-26 RX ADMIN — METAXALONE 800 MG: 800 TABLET ORAL at 11:23

## 2019-03-26 ASSESSMENT — ENCOUNTER SYMPTOMS
FEVER: 0
DIZZINESS: 0
COUGH: 0
SHORTNESS OF BREATH: 0
MYALGIAS: 1
HEADACHES: 0
DIARRHEA: 0
PALPITATIONS: 0
BACK PAIN: 1
CHILLS: 0
NAUSEA: 0
VOMITING: 0

## 2019-03-26 ASSESSMENT — LIFESTYLE VARIABLES
EVER FELT BAD OR GUILTY ABOUT YOUR DRINKING: NO
HAVE YOU EVER FELT YOU SHOULD CUT DOWN ON YOUR DRINKING: NO
EVER HAD A DRINK FIRST THING IN THE MORNING TO STEADY YOUR NERVES TO GET RID OF A HANGOVER: NO
HAVE PEOPLE ANNOYED YOU BY CRITICIZING YOUR DRINKING: NO
HOW MANY TIMES IN THE PAST YEAR HAVE YOU HAD 5 OR MORE DRINKS IN A DAY: 0
TOTAL SCORE: 0
TOTAL SCORE: 0
EVER_SMOKED: YES
TOTAL SCORE: 0
ON A TYPICAL DAY WHEN YOU DRINK ALCOHOL HOW MANY DRINKS DO YOU HAVE: 1
CONSUMPTION TOTAL: NEGATIVE
AVERAGE NUMBER OF DAYS PER WEEK YOU HAVE A DRINK CONTAINING ALCOHOL: 7
ALCOHOL_USE: YES

## 2019-03-26 NOTE — DISCHARGE PLANNING
Anticipated Discharge Disposition:   [facility]    Action:   Bennie from [facility] was at the bedside.   Pt agreed with the transfer.   Pt signed COBRA  APRN aware.   CN and Bedside RN aware.    Barriers to Discharge:   none    Plan:   Dc to [facility] at 1pm

## 2019-03-26 NOTE — CARE PLAN
Problem: Safety  Goal: Will remain free from falls    Intervention: Implement fall precautions  Call light w/in reach. Instructed pt to call for assistance before getting OOB- pt verbalized understanding.        Problem: Pain Management  Goal: Pain level will decrease to patient's comfort goal    Intervention: Educate and implement non-pharmacologic comfort measures. Examples: relaxation, distration, play therapy, activity therapy, massage, etc.  Pt declined pain meds. Educated pt on importance of pain control- pt verbalized understanding.
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
## 2019-03-26 NOTE — DISCHARGE INSTRUCTIONS
Discharge Instructions    Discharged to other by [facility] with escort. Discharged via wheelchair, hospital escort: Yes.  Special equipment needed: TLSO and Walker    Be sure to schedule a follow-up appointment with your primary care doctor or any specialists as instructed.     Discharge Plan:   Influenza Vaccine Indication: Not indicated: Previously immunized this influenza season and > 8 years of age (10/18)    I understand that a diet low in cholesterol, fat, and sodium is recommended for good health. Unless I have been given specific instructions below for another diet, I accept this instruction as my diet prescription.   Other diet: Regular    Special Instructions: Discharge instructions for the Orthopedic Patient    Follow up with Primary Care Physician within 2 weeks of discharge to home, regarding:  Review of medications and diagnostic testing.  Surveillance for medical complications.  Workup and treatment of osteoporosis, if appropriate.     -Is this a Joint Replacement patient? No     -Is this patient being discharged with medication to prevent blood clots?  Yes, Lovenox Enoxaparin injection  What is this medicine?  ENOXAPARIN (ee nox a PA rin) is used after knee, hip, or abdominal surgeries to prevent blood clotting. It is also used to treat existing blood clots in the lungs or in the veins.  This medicine may be used for other purposes; ask your health care provider or pharmacist if you have questions.  COMMON BRAND NAME(S): Lovenox  What should I tell my health care provider before I take this medicine?  They need to know if you have any of these conditions:  -bleeding disorders, hemorrhage, or hemophilia  -infection of the heart or heart valves  -kidney or liver disease  -previous stroke  -prosthetic heart valve  -recent surgery or delivery of a baby  -ulcer in the stomach or intestine, diverticulitis, or other bowel disease  -an unusual or allergic reaction to enoxaparin, heparin, pork or pork  products, other medicines, foods, dyes, or preservatives  -pregnant or trying to get pregnant  -breast-feeding  How should I use this medicine?  This medicine is for injection under the skin. It is usually given by a health-care professional. You or a family member may be trained on how to give the injections. If you are to give yourself injections, make sure you understand how to use the syringe, measure the dose if necessary, and give the injection. To avoid bruising, do not rub the site where this medicine has been injected. Do not take your medicine more often than directed. Do not stop taking except on the advice of your doctor or health care professional.  Make sure you receive a puncture-resistant container to dispose of the needles and syringes once you have finished with them. Do not reuse these items. Return the container to your doctor or health care professional for proper disposal.  Talk to your pediatrician regarding the use of this medicine in children. Special care may be needed.  Overdosage: If you think you have taken too much of this medicine contact a poison control center or emergency room at once.  NOTE: This medicine is only for you. Do not share this medicine with others.  What if I miss a dose?  If you miss a dose, take it as soon as you can. If it is almost time for your next dose, take only that dose. Do not take double or extra doses.  What may interact with this medicine?  -aspirin and aspirin-like medicines  -certain medicines that treat or prevent blood clots  -dipyridamole  -NSAIDs, medicines for pain and inflammation, like ibuprofen or naproxen  This list may not describe all possible interactions. Give your health care provider a list of all the medicines, herbs, non-prescription drugs, or dietary supplements you use. Also tell them if you smoke, drink alcohol, or use illegal drugs. Some items may interact with your medicine.  What should I watch for while using this  medicine?  Visit your doctor or health care professional for regular checks on your progress. Your condition will be monitored carefully while you are receiving this medicine.  Notify your doctor or health care professional and seek emergency treatment if you develop breathing problems; changes in vision; chest pain; severe, sudden headache; pain, swelling, warmth in the leg; trouble speaking; sudden numbness or weakness of the face, arm, or leg. These can be signs that your condition has gotten worse.  If you are going to have surgery, tell your doctor or health care professional that you are taking this medicine.  Do not stop taking this medicine without first talking to your doctor. Be sure to refill your prescription before you run out of medicine.  Avoid sports and activities that might cause injury while you are using this medicine. Severe falls or injuries can cause unseen bleeding. Be careful when using sharp tools or knives. Consider using an electric razor. Take special care brushing or flossing your teeth. Report any injuries, bruising, or red spots on the skin to your doctor or health care professional.  What side effects may I notice from receiving this medicine?  Side effects that you should report to your doctor or health care professional as soon as possible:  -allergic reactions like skin rash, itching or hives, swelling of the face, lips, or tongue  -feeling faint or lightheaded, falls  -signs and symptoms of bleeding such as bloody or black, tarry stools; red or dark-brown urine; spitting up blood or brown material that looks like coffee grounds; red spots on the skin; unusual bruising or bleeding from the eye, gums, or nose  Side effects that usually do not require medical attention (report to your doctor or health care professional if they continue or are bothersome):  -pain, redness, or irritation at site where injected  This list may not describe all possible side effects. Call your doctor for  medical advice about side effects. You may report side effects to FDA at 0-736-EHV-7009.  Where should I keep my medicine?  Keep out of the reach of children.  Store at room temperature between 15 and 30 degrees C (59 and 86 degrees F). Do not freeze. If your injections have been specially prepared, you may need to store them in the refrigerator. Ask your pharmacist. Throw away any unused medicine after the expiration date.  NOTE: This sheet is a summary. It may not cover all possible information. If you have questions about this medicine, talk to your doctor, pharmacist, or health care provider.  © 2018 [employer]/Gold Standard (2015-04-21 16:06:21)      · Is patient discharged on Warfarin / Coumadin?   No     Depression / Suicide Risk    As you are discharged from this [facility] facility, it is important to learn how to keep safe from harming yourself.    Recognize the warning signs:  · Abrupt changes in personality, positive or negative- including increase in energy   · Giving away possessions  · Change in eating patterns- significant weight changes-  positive or negative  · Change in sleeping patterns- unable to sleep or sleeping all the time   · Unwillingness or inability to communicate  · Depression  · Unusual sadness, discouragement and loneliness  · Talk of wanting to die  · Neglect of personal appearance   · Rebelliousness- reckless behavior  · Withdrawal from people/activities they love  · Confusion- inability to concentrate     If you or a loved one observes any of these behaviors or has concerns about self-harm, here's what you can do:  · Talk about it- your feelings and reasons for harming yourself  · Remove any means that you might use to hurt yourself (examples: pills, rope, extension cords, firearm)  · Get professional help from the community (Mental Health, Substance Abuse, psychological counseling)  · Do not be alone:Call your Safe Contact- someone whom you trust who will be there for  you.  · Call your local CRISIS HOTLINE 815-5929 or 606-380-5602  · Call your local Children's Mobile Crisis Response Team [place] (348) 982-7822 or www.Front Up  · Call the toll free National Suicide Prevention Hotlines   · National Suicide Prevention Lifeline 703-459-CZYC (8947)  · National Cuffed and Wanted Line Network 800-SUICIDE (506-7996)

## 2019-03-26 NOTE — THERAPY
"Physical Therapy Treatment completed.   Bed Mobility:  Supine to Sit: Minimal Assist (flat HOB and use of bed rail)  Transfers: Sit to Stand: Contact Guard Assist (elevated height)  Gait: Level Of Assist: Minimal Assist with Front-Wheel Walker       Plan of Care: Will benefit from Physical Therapy 4 times per week  Discharge Recommendations: Equipment: Will Continue to Assess for Equipment Needs. Post-acute therapy Recommend inpatient transitional care services for continued physical therapy services.      See \"Rehab Therapy-Acute\" Patient Summary Report for complete documentation.     Pt continues to progress w/ therapy. Pt was able to show significant improvements w/ bed mobility. Pt able to tolerate log rolling onto his R side. He does have to perform bed mobility in sections d/t increased pain. Pt requiring Jac to don TLSO but was able to instruct SO in how to assist him. He demonstrated improved DF control during gait on the R and increased endurance to gait. Pt continues to be very motivated and will benefit from post acute therapy.  "

## 2019-03-26 NOTE — PROGRESS NOTES
Neurosurgery Progress Note    Subjective:  Doing well. Axial LBP, mostly stiffness and soreness at this point. Slow continued improvement in RLE foot drop, patient working on his own rehab in bed. Anxiously awaiting d/c to rehab for right hip then home. No recent need for pain meds.     Exam:  VSS  A&Ox4, NAD  NM: 5/5 hip flexion Right, left is SP ORIF, knee extension, knee flexion, plantar flexion, EHL  dorsiflexion left 5/5 right 3/5  Sensation intact and equal throughout all four extremities. Mild dysesthesia left first dorsal webspace   Abdomen: soft, non-tender  Non-labored breathing on room air, normal respiratory effort  Capillary refill in all four extremities <2 seconds  No LE edema, erythema, cyanosis, clubbing  Calves non-tender to compression bilat  Prevena dressing checked, incision CDI nearly completely healed looks pristine. Margins well approximated, no erythema.    BP  Min: 130/82  Max: 143/86  Pulse  Av.3  Min: 80  Max: 82  Resp  Av  Min: 16  Max: 16  Temp  Av.7 °C (98 °F)  Min: 36.4 °C (97.6 °F)  Max: 37 °C (98.6 °F)  SpO2  Av.7 %  Min: 94 %  Max: 97 %    No Data Recorded        Recent Labs      19   0240   SODIUM  137   POTASSIUM  3.5*   CHLORIDE  104   CO2  24   GLUCOSE  112*   BUN  12   CREATININE  0.68   CALCIUM  8.2*               Intake/Output       19 0700 - 19 0659 19 0700 - 19 0659       Total  3948-3713 Total       Intake    P.O.  280  -- 280  --  -- --    P.O. 280 -- 280 -- -- --    Total Intake 280 -- 280 -- -- --       Output    Urine  --  -- --  --  -- --    Number of Times Voided -- 1 x 1 x -- -- --    Total Output -- -- -- -- -- --       Net I/O     280 -- 280 -- -- --          No intake or output data in the 24 hours ending 19 0812         • enoxaparin (LOVENOX) injection  30 mg Q12HRS   • metaxalone  800 mg TID   • Pharmacy Consult Request  1 Each PHARMACY TO DOSE   • ondansetron  4 mg Q4HRS PRN    • dexamethasone  4 mg Once PRN   • diphenhydrAMINE  25 mg Q6HRS PRN   • haloperidol lactate  1 mg Q6HRS PRN   • scopolamine  1 Patch Q72HRS PRN   • docusate sodium  100 mg BID   • senna-docusate  1 Tab Nightly   • senna-docusate  1 Tab Q24HRS PRN   • polyethylene glycol/lytes  1 Packet BID PRN   • magnesium hydroxide  30 mL QDAY PRN   • bisacodyl  10 mg Q24HRS PRN   • fleet  1 Each Once PRN   • oxyCODONE immediate-release  5 mg Q3HRS PRN   • oxyCODONE immediate release  10 mg Q3HRS PRN   • Respiratory Care per Protocol   Continuous RT   • morphine injection  4 mg Q3HRS PRN   • acetaminophen  650 mg Q4HRS PRN    Or   • acetaminophen  650 mg Q4HRS PRN       Assessment and Plan:  Hospital day #9  POD #6 Right L4-5 hemilaminectomy, L4-S1 TLIF   Prophylactic anticoagulation: yes         Start date/time: per hospitalist  Prevena incisional management in place, no output, not alarming device designed to be d/c after 7 days post op.  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 Pt. Awaiting rehab placement.  Patient agrees with treatment plan.   Case discussed with Dr. Muñoz.

## 2019-03-26 NOTE — PROGRESS NOTES
Pt aaox4. BARBOUR well. Up w/ SBA. TTWB to RLE. + R foot drop- pt doing exercises in bed- declines boot brace this AM. Wound vac removed per APN. New dressing placed. RLE dressings CDI. Pt declines pain meds. +BS. Voiding w/o difficulty. Reviewed poc with pt-verbalized understanding. Call light in reach. Pt to d/c to Rehab at 1300.

## 2019-03-26 NOTE — DISCHARGE PLANNING
Ghazala with [employer] has authorized Acute Inpatient Rehablitation with [facility]. Authorization 03/26/2019 through 04/01/2019 initial authorization number [id number]. Concurrent review with Ghazala phone number [phone] preferred fax update to [phone]. Will follow for medical clearances.

## 2019-03-26 NOTE — FLOWSHEET NOTE
03/26/19 1429   Type of Assessment   Assessment Yes   Patient History   Pulmonary Diagnosis no   Surgical Procedures hip and femur   Home O2 No   Home Treatments/Frequency No   COPD Risk Screening   Do you have a history of COPD? No   Smoking History   Have you ever smoked Yes   Have you smoked in the last 12 months No   Confirm Quit Date 03/26/89   Level Of Consciousness   Level of Consciousness Alert   Respiratory WDL   Respiratory (WDL) X   Chest Exam   Respiration 18   Pulse 88   Breath Sounds   RML Breath Sounds Diminished   RLL Breath Sounds Diminished   LLL Breath Sounds Diminished   Secretions   Cough Productive  (per pt)   Oximetry   #Pulse Oximetry (Single Determination) Yes   Oxygen   Home O2 Use Prior To Admission? No   Pulse Oximetry 97 %   O2 Daily Delivery Respiratory  Room Air with O2 Available

## 2019-03-26 NOTE — DISCHARGE PLANNING
Anticipated Discharge Disposition:   [facility]    Action:   [facility]ennie at [facility] has confirmed that they will be accepting pt today but no confirmed time of  yet.     Barriers to Discharge:   Pending time of .     Plan:   Follow up with Bennie

## 2019-03-26 NOTE — PROGRESS NOTES
RN MOBILITY NOTE     Surgery patient?: yes  Date of surgery: 3/19/19  Ambulated 50 ft on day of surgery? (N/A if today is not date of surgery): n/a  Number of times ambulated 50 feet or greater today: 3  Patient has been up to chair, edge of bed or HOB 90 degrees for all meals?: yes  Goal met? (goal is ambulating at least 50 feet 2 times on day shift, one time on night shift):   If patient did not meet mobility goal, why?: met

## 2019-03-26 NOTE — PROGRESS NOTES
Went over discharge instructions w/ pt, when to call the doctor, f/u appointments, medications, spinal precautions. Copy of discharge paperwork given to pt. Pt had no further questions. Report called to Samia DEUTSCH at [facility]

## 2019-03-27 LAB
25(OH)D3 SERPL-MCNC: 33 NG/ML (ref 30–100)
ALBUMIN SERPL BCP-MCNC: 3.1 G/DL (ref 3.2–4.9)
ALBUMIN/GLOB SERPL: 1.2 G/DL
ALP SERPL-CCNC: 115 U/L (ref 30–99)
ALT SERPL-CCNC: 139 U/L (ref 2–50)
ANION GAP SERPL CALC-SCNC: 9 MMOL/L (ref 0–11.9)
AST SERPL-CCNC: 47 U/L (ref 12–45)
BASOPHILS # BLD AUTO: 0.4 % (ref 0–1.8)
BASOPHILS # BLD: 0.04 K/UL (ref 0–0.12)
BILIRUB SERPL-MCNC: 0.9 MG/DL (ref 0.1–1.5)
BUN SERPL-MCNC: 15 MG/DL (ref 8–22)
CALCIUM SERPL-MCNC: 8.4 MG/DL (ref 8.5–10.5)
CHLORIDE SERPL-SCNC: 105 MMOL/L (ref 96–112)
CO2 SERPL-SCNC: 24 MMOL/L (ref 20–33)
CREAT SERPL-MCNC: 0.75 MG/DL (ref 0.5–1.4)
EOSINOPHIL # BLD AUTO: 0.24 K/UL (ref 0–0.51)
EOSINOPHIL NFR BLD: 2.5 % (ref 0–6.9)
ERYTHROCYTE [DISTWIDTH] IN BLOOD BY AUTOMATED COUNT: 46.5 FL (ref 35.9–50)
GLOBULIN SER CALC-MCNC: 2.5 G/DL (ref 1.9–3.5)
GLUCOSE SERPL-MCNC: 108 MG/DL (ref 65–99)
HCT VFR BLD AUTO: 32 % (ref 42–52)
HGB BLD-MCNC: 10.9 G/DL (ref 14–18)
IMM GRANULOCYTES # BLD AUTO: 0.22 K/UL (ref 0–0.11)
IMM GRANULOCYTES NFR BLD AUTO: 2.3 % (ref 0–0.9)
LYMPHOCYTES # BLD AUTO: 1.32 K/UL (ref 1–4.8)
LYMPHOCYTES NFR BLD: 13.8 % (ref 22–41)
MCH RBC QN AUTO: 32.4 PG (ref 27–33)
MCHC RBC AUTO-ENTMCNC: 34.1 G/DL (ref 33.7–35.3)
MCV RBC AUTO: 95.2 FL (ref 81.4–97.8)
MONOCYTES # BLD AUTO: 0.91 K/UL (ref 0–0.85)
MONOCYTES NFR BLD AUTO: 9.5 % (ref 0–13.4)
NEUTROPHILS # BLD AUTO: 6.82 K/UL (ref 1.82–7.42)
NEUTROPHILS NFR BLD: 71.5 % (ref 44–72)
NRBC # BLD AUTO: 0 K/UL
NRBC BLD-RTO: 0 /100 WBC
PLATELET # BLD AUTO: 474 K/UL (ref 164–446)
PMV BLD AUTO: 9.4 FL (ref 9–12.9)
POTASSIUM SERPL-SCNC: 4.1 MMOL/L (ref 3.6–5.5)
PROT SERPL-MCNC: 5.6 G/DL (ref 6–8.2)
RBC # BLD AUTO: 3.36 M/UL (ref 4.7–6.1)
SODIUM SERPL-SCNC: 138 MMOL/L (ref 135–145)
TSH SERPL DL<=0.005 MIU/L-ACNC: 1.05 UIU/ML (ref 0.38–5.33)
WBC # BLD AUTO: 9.6 K/UL (ref 4.8–10.8)

## 2019-03-27 PROCEDURE — 97110 THERAPEUTIC EXERCISES: CPT

## 2019-03-27 PROCEDURE — 99233 SBSQ HOSP IP/OBS HIGH 50: CPT | Performed by: PHYSICAL MEDICINE & REHABILITATION

## 2019-03-27 PROCEDURE — 97530 THERAPEUTIC ACTIVITIES: CPT

## 2019-03-27 PROCEDURE — 82306 VITAMIN D 25 HYDROXY: CPT

## 2019-03-27 PROCEDURE — 85025 COMPLETE CBC W/AUTO DIFF WBC: CPT

## 2019-03-27 PROCEDURE — 80053 COMPREHEN METABOLIC PANEL: CPT

## 2019-03-27 PROCEDURE — 700102 HCHG RX REV CODE 250 W/ 637 OVERRIDE(OP): Performed by: PHYSICAL MEDICINE & REHABILITATION

## 2019-03-27 PROCEDURE — 770010 HCHG ROOM/CARE - REHAB SEMI PRIVAT*

## 2019-03-27 PROCEDURE — 97162 PT EVAL MOD COMPLEX 30 MIN: CPT

## 2019-03-27 PROCEDURE — A9270 NON-COVERED ITEM OR SERVICE: HCPCS | Performed by: PHYSICAL MEDICINE & REHABILITATION

## 2019-03-27 PROCEDURE — 36415 COLL VENOUS BLD VENIPUNCTURE: CPT

## 2019-03-27 PROCEDURE — 97166 OT EVAL MOD COMPLEX 45 MIN: CPT

## 2019-03-27 PROCEDURE — 97535 SELF CARE MNGMENT TRAINING: CPT

## 2019-03-27 PROCEDURE — 84443 ASSAY THYROID STIM HORMONE: CPT

## 2019-03-27 PROCEDURE — 700112 HCHG RX REV CODE 229: Performed by: PHYSICAL MEDICINE & REHABILITATION

## 2019-03-27 PROCEDURE — 700111 HCHG RX REV CODE 636 W/ 250 OVERRIDE (IP): Performed by: PHYSICAL MEDICINE & REHABILITATION

## 2019-03-27 RX ADMIN — METHOCARBAMOL 750 MG: 750 TABLET ORAL at 21:19

## 2019-03-27 RX ADMIN — METHOCARBAMOL 750 MG: 750 TABLET ORAL at 13:58

## 2019-03-27 RX ADMIN — DOCUSATE SODIUM 100 MG: 100 CAPSULE, LIQUID FILLED ORAL at 09:38

## 2019-03-27 RX ADMIN — ENOXAPARIN SODIUM 30 MG: 100 INJECTION SUBCUTANEOUS at 09:37

## 2019-03-27 RX ADMIN — ENOXAPARIN SODIUM 30 MG: 100 INJECTION SUBCUTANEOUS at 21:20

## 2019-03-27 RX ADMIN — METHOCARBAMOL 750 MG: 750 TABLET ORAL at 17:56

## 2019-03-27 RX ADMIN — OMEPRAZOLE 20 MG: 20 CAPSULE, DELAYED RELEASE ORAL at 09:38

## 2019-03-27 RX ADMIN — METHOCARBAMOL 750 MG: 750 TABLET ORAL at 09:38

## 2019-03-27 RX ADMIN — SENNOSIDES AND DOCUSATE SODIUM 2 TABLET: 8.6; 5 TABLET ORAL at 09:38

## 2019-03-27 RX ADMIN — ACETAMINOPHEN 650 MG: 325 TABLET, FILM COATED ORAL at 21:26

## 2019-03-27 ASSESSMENT — BRIEF INTERVIEW FOR MENTAL STATUS (BIMS)
INITIAL REPETITION OF BED BLUE SOCK - FIRST ATTEMPT: 3
ASKED TO RECALL SOCK: YES, NO CUE REQUIRED
ASKED TO RECALL BLUE: YES, NO CUE REQUIRED
WHAT YEAR IS IT: CORRECT
ASKED TO RECALL BED: YES, NO CUE REQUIRED
BIMS SUMMARY SCORE: 15
WHAT MONTH IS IT: ACCURATE WITHIN 5 DAYS
WHAT DAY OF THE WEEK IS IT: CORRECT

## 2019-03-27 ASSESSMENT — ACTIVITIES OF DAILY LIVING (ADL): TOILETING: INDEPENDENT

## 2019-03-27 NOTE — THERAPY
Occupational Therapy   Initial Evaluation     Patient Name: Baljinder Mendieta  Age:  58 y.o., Sex:  male  Medical Record #: [id number]  Today's Date: 3/27/2019     Subjective    Patient in bathroom using toilet upon OT arrival to room.  Agreeable to OT evaluation.     Objective     03/27/19 1001   Prior Living Situation   Prior Services None   Housing / Facility 2 Story House   Steps Into Home 2   Steps In Home 16  (8 and 8 w/ a landing)   Rail Left Rail (Steps in Home);Right Rail  (Steps in Home)  (left on one set, right on the other set)   Elevator No   Bathroom Set up Walk In Shower   Lives with - Patient's Self Care Capacity Spouse   Prior Level of ADL Function   Self Feeding Independent   Grooming / Hygiene Independent   Bathing Independent   Dressing Independent   Toileting Independent   Prior Level of IADL Function   Medication Management Independent   Laundry Independent   Kitchen Mobility Independent   Finances Independent   Home Management Independent   Shopping Independent   Prior Level Of Mobility Independent Without Device in Community   Driving / Transportation Driving Independent   Occupation (Pre-Hospital Vocational) 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)   Leisure Interests Exercise;Other (Comments)  (hiking, snowmobiling, skiing)   IRF-BRAULIO:  Prior Functioning: Everyday Activities   Self Care Independent   Indoor Mobility (Ambulation) Independent   Stairs Independent   Functional Cognition Independent   Prior Device Use None of the given options   Vitals   O2 Delivery None (Room Air)   Pain   Pain Scales 0 to 10 Scale    Pain 0 - 10 Group   Location Back;Pelvis;Leg   Location Orientation Right   Therapist Pain Assessment 1;Prior to Activity   Non Verbal Descriptors   Non Verbal Scale  Calm   Cognition    Cognition / Consciousness WDL   Orientation Level Oriented x 4   Level of Consciousness Alert   IRF-BRAULIO:  Cognitive Pattern Assessment   Cognitive Pattern Assessment Used BIMS  "  IRF-BRAULIO:  Brief Interview for Mental Status (BIMS)   Repetition of Three Words (First Attempt) 3   Temporal Orientation: Able to Report the Correct Year Correct   Temporal Orientation: Able to Report the Correct Month Accurate within 5 days   Temporal Orientation: Able to Report the Correct Day of the Week Correct   Able to Recall \"Sock\" Yes, no cue required   Able to Recall \"Blue\" Yes, no cue required   Able to Recall \"Bed\" Yes, no cue required   BIMS Summary Score 15   Vision Screen   Vision Not tested   Passive ROM Upper Body   Passive ROM Upper Body WDL   Active ROM Upper Body   Active ROM Upper Body  WDL   Strength Upper Body   Upper Body Strength  WDL   Sensation Upper Body   Upper Extremity Sensation  Not Tested   Upper Body Muscle Tone   Upper Body Muscle Tone  WDL   Balance Assessment   Sitting Balance (Static) Normal   Sitting Balance (Dynamic) Good   Standing Balance (Static) Fair -  (with no UB support)   Weight Shift Sitting Good   Weight Shift Standing Poor  (due to TTWB R LE)   Bed Mobility    Sit to Supine Moderate Assist  (assist w/ B LE)   Sit to Stand Stand by Assist   Scooting Supervised   Coordination Upper Body   Coordination WDL   IRF-BRAULIO:  Eating   Assistance Needed Independent   [place] Physical Assistance Level No physical assistance or only touching/steadying assist   CARE Score 6   Discharge Goal:  Assistance Needed Independent   Discharge Goal:  Physical Assistance Level No physical assistance or only touching/steadying assist   Discharge Goal:  Score 6   IRF-BRAULIO:  Oral Hygiene   Assistance Needed Set-up / clean-up   Physical Assistance Level No physical assistance   CARE Score 5   Discharge Goal:  Assistance Needed Independent   Discharge Goal:  Physical Assistance Level No physical assistance or only touching/steadying assist   Discharge Goal:  Score 6   IRF-BRAULIO:  Shower/Bathe Self   Assistance Needed Physical assistance;Verbal cues;Supervision;Set-up / clean-up   Physical Assistance " Level Less than 25%   CARE Score 3   Discharge Goal:  Assistance Needed Independent;Adaptive equipment   Discharge Goal:  Physical Assistance Level No physical assistance or only touching/steadying assist   Discharge Goal Score 6   IRF-BRAULIO:  Upper Body Dressing   Assistance Needed Set-up / clean-up   Physical Assistance Level No physical assistance or only touching/steadying assist   CARE Score 5   Discharge Goal:  Assistance Needed Independent   Discharge Goal:  Physical Assistance Level No physical assistance or only touching/steadying assist   Dischage Goal:  Score 6   IRF-BRAULIO:  Lower Body Dressing   Assistance Needed Physical assistance;Set-up / clean-up;Supervision   Physical Assistance Level Less than 25%   CARE Score 3   Discharge Goal:  Assistance Needed Independent;Adaptive equipment   Discharge Goal:  Physical Assistance Level No physical assistance or only touching/steadying assist   Discharge Goal:  Score 6   IRF BRAULIO:  Putting On/Taking Off Footwear   Assistance Needed Physical assistance   Physical Assistance Level 75% or more   CARE Score 2   Discharge Goal:  Assistance Needed Independent;Adaptive equipment   Discharge Goal:  Physical Assistance Level No physical assistance or only touching/steadying assist   Discharge Goal:  Score 6   IRF-BRAULIO:  Toileting Hygiene   Assistance Needed Set-up / clean-up;Supervision;Adaptive equipment   Physical Assistance Level No physical assistance or only touching/steadying assist   CARE Score 4   Discharge Goal:  Assistance Needed Independent;Adaptive equipment   Discharge Goal:  Physical Assistance Level No physical assistance or only touching/steadying assist   Discharge Goal:  Score 6   IRF-BRAULIO:  Toilet Transfer   Assistance Needed Set-up / clean-up;Adaptive equipment;Supervision   Physical Assistance Level No physical assistance or only touching/steadying assist   CARE Score 4   Discharge Goal:  Assistance Needed Independent;Adaptive equipment   Discharge Goal:   Physical Assistance Level No physical assistance or only touching/steadying assist   Discahrge Goal:  Score 6   IRF-BRAULIO:  Hearing, Speech, and Vision   Expression of Ideas and Wants 4   Understanding Verbal and Non-Verbal Content 4   Problem List   Problem List Decreased Active Daily Living Skills;Decreased Homemaking Skills;Decreased Functional Mobility;Decreased Activity Tolerance;Limited Knowledge of Post Op Precautions;Impaired Postural Control / Balance   Precautions   Precautions Fall Risk;Spinal / Back Precautions ;TLSO (Thoracolumbosacral orthosis);Toe Touch Weight Bearing Right Lower Extremity   Comments TLSO on when OOB > 5 minutes and when HOB > 45 degrees   Current Discharge Plan   Current Discharge Plan Return to Prior Living Situation   Benefit    Therapy Benefit Patient Would Benefit from Inpatient Rehab Occupational Therapy to Maximize [place] with ADLs, IADLs and Functional Mobility.   Interdisciplinary Plan of Care Collaboration   IDT Collaboration with  Physical Therapist   Patient Position at End of Therapy In Bed;Call Light within Reach;Tray Table within Reach;Phone within Reach   Collaboration Comments CLOF   Equipment Needs   Assistive Device / DME Front-Wheel Walker;Wheelchair;Shower Chair;Grab Bars In Shower / Tub;Grab Bars By Toilet;Thoraco-lumbar-sacral Orthosis ([place])   Adaptive Equipment Reacher;Sock Aide;Dressing Stick;Long Handled Shoe Horn;Long Handled Sponge   OT Total Time Spent   OT Individual Total Time Spent (Mins) 60   OT Charge Group   Charges Yes   OT Self Care / ADL 1   OT Evaluation OT Evaluation Mod       FIM Eating Score:  7 - Independent  Eating Description:       FIM Grooming Score:  7 - Independent  Grooming Description:       FIM Bathing Score:  4 - Minimal Assistance  Bathing Description:  Adaptive equipment, Hand held shower, Set-up of equipment, Initial preparation for task (assist to wash/dry right lower leg/foot)    FIM Upper Body Dressin -  Standby Prompting/Supervision or Set-up  Upper Body Dressing Description:  Set-up of equipment    FIM Lower Body Dressing Score:  3 - Moderate Assistance  Lower Body Dressing Description:   (assist w/ 5/16 tasks)    FIM Toileting Body Dressin - Standby Prompting/Supervision or Set-up  Toileting Description:  Grab bar, Supervision for safety (SBA w/ GB)    FIM Bed/Chair/Wheelchair Transfers Score:    Bed/Chair/Wheelchair Transfers Description:       FIM Toilet Transfer Score:   5 - Standby Prompting/Supervision or Set-up  Toilet Transfer Description:   Grab bar, Supervision for safety (SBA w/ GB)    FIM Tub/Shower Transfers Score:  5 - Standby Prompting/Supervision or Set-up  Tub/Shower Transfers Description:  Grab bar, Adaptive equipment, Supervision for safety, Set-up of equipment, Initial preparation for task, Verbal cueing (setup/sba w/ grab bar and commode w/ cue to park w/c closer to commode)    FIM Comprehension Score:  7 - Independent  Comprehension Description:       FIM Expression Score:  7 - Independent  Expression Description:       FIM Social Interaction Score:  7 - Independent  Social Interaction Description:       FIM Problem Solving Score:  7 - Independent  Problem Solving Description:       Assessment  Patient is 58 y.o. male with a diagnosis of large R acetabular fracture s/p ORIF and multiple spinal fractures s/p lumbar fusion.  Additional factors influencing patient status / progress (ie: cognitive factors, co-morbidities, social support, etc): lives with spouse in two story house (bedroom upstairs), TTWB R LE, independent PLOF.      Plan  Recommend Occupational Therapy  minutes per day 5-7 days per week for 7-10 days for the following treatments:  OT Group Therapy, OT Self Care/ADL, OT Community Reintegration, OT Manual Ther Technique, OT Neuro Re-Ed/Balance, OT Therapeutic Activity, OT Evaluation and OT Therapeutic Exercise.    Goals:  Long term and short term goals have been  discussed with patient and they are in agreement.         Occupational Therapy Goals           Problem: Bathing     Dates: Start: 03/27/19       Goal: STG-Within one week, patient will bathe     Dates: Start: 03/27/19       Description: 1) Individualized Goal:  Body setup using AE/AD as needed.  2) Interventions:  OT Group Therapy, OT Self Care/ADL, OT Community Reintegration, OT Neuro Re-Ed/Balance, OT Therapeutic Activity, OT Evaluation and OT Therapeutic Exercise               Problem: Dressing     Dates: Start: 03/27/19       Goal: STG-Within one week, patient will dress LB     Dates: Start: 03/27/19       Description: 1) Individualized Goal:  With supervision using AE as needed.  2) Interventions:  OT Group Therapy, OT Self Care/ADL, OT Community Reintegration, OT Neuro Re-Ed/Balance, OT Therapeutic Activity, OT Evaluation and OT Therapeutic Exercise               Problem: Functional Transfers     Dates: Start: 03/27/19       Goal: STG-Within one week, patient will transfer to toilet     Dates: Start: 03/27/19       Description: 1) Individualized Goal:  With supervision using grab bar.  2) Interventions:  OT Group Therapy, OT Self Care/ADL, OT Community Reintegration, OT Neuro Re-Ed/Balance, OT Therapeutic Activity, OT Evaluation and OT Therapeutic Exercise               Problem: OT Long Term Goals     Dates: Start: 03/27/19       Goal: LTG-By discharge, patient will complete basic self care tasks     Dates: Start: 03/27/19       Description: 1) Individualized Goal:  Mod I using AE/AD/techniques as needed.  2) Interventions:  OT Group Therapy, OT Self Care/ADL, OT Community Reintegration, OT Neuro Re-Ed/Balance, OT Therapeutic Activity, OT Evaluation and OT Therapeutic Exercise             Goal: LTG-By discharge, patient will perform bathroom transfers     Dates: Start: 03/27/19       Description: 1) Individualized Goal:  Mod I using AE/AD/techniques as needed.  2) Interventions:  OT Group Therapy, OT Self  Care/ADL, OT Community Reintegration, OT Neuro Re-Ed/Balance, OT Therapeutic Activity, OT Evaluation and OT Therapeutic Exercise               Problem: Toileting     Dates: Start: 03/27/19       Goal: STG-Within one week, patient will complete toileting tasks     Dates: Start: 03/27/19       Description: 1) Individualized Goal:  With supervision using toilet and grab bar.  2) Interventions:  OT Group Therapy, OT Self Care/ADL, OT Community Reintegration, OT Neuro Re-Ed/Balance, OT Therapeutic Activity, OT Evaluation and OT Therapeutic Exercise

## 2019-03-27 NOTE — THERAPY
Occupational Therapy  Daily Treatment     Patient Name: Baljinder Mendieta  Age:  58 y.o., Sex:  male  Medical Record #: [id number]  Today's Date: 3/27/2019     Precautions  Precautions: (P) Fall Risk, Spinal / Back Precautions , TLSO (Thoracolumbosacral orthosis), Toe Touch Weight Bearing Right Lower Extremity  Comments: (P) TLSO on when OOB > 5 minutes and when HOB > 45 degrees         Subjective    Patient visiting with friends in room upon OT arrival.  Agreeable to get out of bed for OT.     Objective       03/27/19 1301   Precautions   Precautions Fall Risk;Spinal / Back Precautions ;TLSO (Thoracolumbosacral orthosis);Toe Touch Weight Bearing Right Lower Extremity   Comments TLSO on when OOB > 5 minutes and when HOB > 45 degrees   Sitting Upper Body Exercises   Sitting Upper Body Exercises Yes   Lat Pull 3 sets of 10;Bilateral;Weight (See Comments for lbs)  (35, 40, 40 lbs using pulleys)   Bilateral Row 3 sets of 10;Bilateral;Weight (See Comments for lbs)  (25 lbs)   Tricep Press 3 sets of 10;Bilateral;Weight (See Comments for lbs)  (40, 50, 50 lbs forward, 40, 40, 30 lbs backward on [facility])   Bed Mobility    Supine to Sit Moderate Assist  (assist lowering B LEs from EOB)   Rolling Supervised   OT Total Time Spent   OT Individual Total Time Spent (Mins) 30   OT Charge Group   OT Therapy Activity 1   OT Therapeutic Exercise  1        03/27/19 1301   Precautions   Precautions Fall Risk;Spinal / Back Precautions ;TLSO (Thoracolumbosacral orthosis);Toe Touch Weight Bearing Right Lower Extremity   Comments TLSO on when OOB > 5 minutes and when HOB > 45 degrees   Sitting Upper Body Exercises   Sitting Upper Body Exercises Yes   Lat Pull 3 sets of 10;Bilateral;Weight (See Comments for lbs)  (35, 40, 40 lbs using pulleys)   Bilateral Row 3 sets of 10;Bilateral;Weight (See Comments for lbs)  (25 lbs)   Tricep Press 3 sets of 10;Bilateral;Weight (See Comments for lbs)  (40, 50, 50 lbs forward, 40, 40, 30 lbs backward on  reese)   Bed Mobility    Supine to Sit Moderate Assist  (assist lowering B LEs from EOB)   Rolling Supervised   OT Total Time Spent   OT Individual Total Time Spent (Mins) 30   OT Charge Group   OT Therapy Activity 1   OT Therapeutic Exercise  1     Educated on purpose of and practiced with sock aide and dressing stick to don/doff right sock.  Left in room for patient to use while at rehab.    FIM Lower Body Dressing Score:  5 - Standby Prompting/Supervsion or Set-up  Lower Body Dressing Description:  Supervision for safety, Verbal cueing, Set-up of equipment (setup and verbal cues to don/doff socks, used AE for R LE)      Assessment    Able to demonstrate ability to don/doff right sock using AE.  Able to don/doff left sock without AD.  Motivated to increase UB strength to assist him with functional mobility with FWW due to TTWB on R LE.    Plan    Assess for mod I in room from w/c level over next couple of days, IADLs from FWW level, UB strength, endurance building, standing balance.

## 2019-03-27 NOTE — THERAPY
Physical Therapy   Daily Treatment     Patient Name: Baljinder Mendieta  Age:  58 y.o., Sex:  male  Medical Record #: [id number]  Today's Date: 3/27/2019     Precautions  Precautions: Fall Risk, Spinal / Back Precautions , TLSO (Thoracolumbosacral orthosis), Toe Touch Weight Bearing Right Lower Extremity  Comments: TLSO on when OOB > 5 minutes and when HOB > 45 degrees    Subjective    Pt in bed, agreeable to PT      Objective       03/27/19 1401   Precautions   Precautions Fall Risk;Spinal / Back Precautions ;TLSO (Thoracolumbosacral orthosis);Toe Touch Weight Bearing Right Lower Extremity   Comments TLSO on when OOB > 5 minutes and when HOB > 45 degrees   Sitting Lower Body Exercises   Ankle Pumps 1 set of 15   Hip Abduction 1 set of 15   Hip Adduction 1 set of 15   Long Arc Quad 1 set of 15   Marching 1 set of 15   Hamstring Curl 1 set of 15   Other Exercises Above exercises with 3 lb ankle weight on L and green tband, pt issued hand-out to complete exercises in room   Interdisciplinary Plan of Care Collaboration   IDT Collaboration with     Patient Position at End of Therapy In Bed;Call Light within Reach;Tray Table within Reach   Collaboration Comments Paty HDZ speaking with pt after PT session at bedside   PT Total Time Spent   PT Individual Total Time Spent (Mins) 30   PT Charge Group   PT Therapeutic Exercise 2       FIM Bed/Chair/Wheelchair Transfers Score: 4 - Minimal Assistance  Bed/Chair/Wheelchair Transfers Description:  Assist with one limb, Set-up of equipment, Supervision for safety (Bed <> WC, min A for sit > supine for RLE management, SBA for remainder of SPT)      Assessment    Pt able to complete all seated exercises this session with only mild increase in pain.     Plan    Continue LE strengthening, gait training with FWW progressing to axillary crutches, stair training
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
## 2019-03-27 NOTE — PROGRESS NOTES
Admit to [facility] from [facility].  Dr. Ny to follow for diagnosis of Right acetabular fx and Fx of the lumbar spine.  Initial assessment initiated. Orientation to Rehabilitation Hospital process, safety in room, bathroom, and call light.    Client/family goal strengthening.    Dr. Ny notified at 1600.     The Rehabilitation Interdisciplinary Plan Of Care(s) intitiated are as follows:  Impaired Physical Mobility and Pain.      Education binder given to patient and explained.

## 2019-03-27 NOTE — CARE PLAN
Problem: Communication  Goal: The ability to communicate needs accurately and effectively will improve  Patient alert and oriented x 4, he is able to communicate his needs accurately and effectively.      Problem: Safety  Goal: Will remain free from falls  Pt uses call light consistently and appropriately. Waits for assistance does not attempt self transfer this shift. Able to verbalize needs.    Problem: Bowel/Gastric:  Goal: Normal bowel function is maintained or improved  Bowel sounds present x 4 quads.

## 2019-03-27 NOTE — REHAB-PHARMACY IDT TEAM NOTE
Pharmacy   Pharmacy  Antibiotics/Antifungals/Antivirals:  Medication:      Active Orders     None        Route:         None  Stop Date:  N/A  Reason:   Antihypertensives/Cardiac:  Medication:    Orders (72h ago through future)    Start     Ordered    03/26/19 1331  hydrALAZINE (APRESOLINE) tablet 25 mg  EVERY 8 HOURS PRN      03/26/19 1331        Patient Vitals for the past 24 hrs:   BP Pulse   03/27/19 1300 136/84 86   03/27/19 0652 134/82 89   03/26/19 1839 123/66 89       Anticoagulation:  Medication:  lovenox  INR:      Other key medications:      A review of the medication list reveals no issues at this time.    Section completed by:  Marcin Kendrick RPH
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
## 2019-03-27 NOTE — PROGRESS NOTES
-----------Non-Face-to-Face------------  Prolonged non-face-to-face time was spent on 3/22/19 from 1042 to 1113 and 1215 to 1225 by this reviewer.  This time included medical chart review, medication review, and decision making for the appropriateness of transfer to inpatient rehabilitation.  Please see PM&R Chart Review Consult from 3/22/19 by this provider for detailed summation of the medical chart and the reasoning for current recommendations. In addition to the above, time was spent coordinating care with case management as well as transitional care coordination team in the acceptance and transfer of the patient to the inpatient rehabilitation facility setting.

## 2019-03-28 PROCEDURE — 97530 THERAPEUTIC ACTIVITIES: CPT

## 2019-03-28 PROCEDURE — 700111 HCHG RX REV CODE 636 W/ 250 OVERRIDE (IP): Performed by: PHYSICAL MEDICINE & REHABILITATION

## 2019-03-28 PROCEDURE — 99233 SBSQ HOSP IP/OBS HIGH 50: CPT | Performed by: PHYSICAL MEDICINE & REHABILITATION

## 2019-03-28 PROCEDURE — 770010 HCHG ROOM/CARE - REHAB SEMI PRIVAT*

## 2019-03-28 PROCEDURE — 97110 THERAPEUTIC EXERCISES: CPT

## 2019-03-28 PROCEDURE — A9270 NON-COVERED ITEM OR SERVICE: HCPCS | Performed by: PHYSICAL MEDICINE & REHABILITATION

## 2019-03-28 PROCEDURE — 700102 HCHG RX REV CODE 250 W/ 637 OVERRIDE(OP): Performed by: PHYSICAL MEDICINE & REHABILITATION

## 2019-03-28 PROCEDURE — 97116 GAIT TRAINING THERAPY: CPT

## 2019-03-28 PROCEDURE — 97535 SELF CARE MNGMENT TRAINING: CPT

## 2019-03-28 RX ADMIN — ENOXAPARIN SODIUM 30 MG: 100 INJECTION SUBCUTANEOUS at 22:21

## 2019-03-28 RX ADMIN — OMEPRAZOLE 20 MG: 20 CAPSULE, DELAYED RELEASE ORAL at 08:31

## 2019-03-28 RX ADMIN — ENOXAPARIN SODIUM 30 MG: 100 INJECTION SUBCUTANEOUS at 08:31

## 2019-03-28 RX ADMIN — METHOCARBAMOL 750 MG: 750 TABLET ORAL at 17:45

## 2019-03-28 RX ADMIN — METHOCARBAMOL 750 MG: 750 TABLET ORAL at 12:11

## 2019-03-28 RX ADMIN — ACETAMINOPHEN 650 MG: 325 TABLET, FILM COATED ORAL at 22:21

## 2019-03-28 RX ADMIN — ACETAMINOPHEN 650 MG: 325 TABLET, FILM COATED ORAL at 08:35

## 2019-03-28 RX ADMIN — METHOCARBAMOL 750 MG: 750 TABLET ORAL at 22:16

## 2019-03-28 RX ADMIN — METHOCARBAMOL 750 MG: 750 TABLET ORAL at 08:31

## 2019-03-28 ASSESSMENT — PATIENT HEALTH QUESTIONNAIRE - PHQ9
2. FEELING DOWN, DEPRESSED, IRRITABLE, OR HOPELESS: NOT AT ALL
1. LITTLE INTEREST OR PLEASURE IN DOING THINGS: NOT AT ALL
SUM OF ALL RESPONSES TO PHQ9 QUESTIONS 1 AND 2: 0

## 2019-03-28 NOTE — REHAB-OT IDT TEAM NOTE
Occupational Therapy   Activities of Daily Living  Eating Initial:  7 - Independent  Eating Current:  7 - Independent   Eating Description:     Grooming Initial:  7 - Independent  Grooming Current:  7 - Independent   Grooming Description:     Bathing Initial:  4 - Minimal Assistance  Bathing Current:  4 - Minimal Assistance   Bathing Description:  Adaptive equipment, Hand held shower, Set-up of equipment, Initial preparation for task (assist to wash/dry right lower leg/foot)  Upper Body Dressing Initial:  5 - Standby Prompting/Supervision or Set-up  Upper Body Dressing Current:  5 - Standby Prompting/Supervision or Set-up   Upper Body Dressing Description:  Set-up of equipment  Lower Body Dressing Initial:  3 - Moderate Assistance  Lower Body Dressing Current:  5 - Standby Prompting/Supervsion or Set-up   Lower Body Dressing Description:  5 - Standby Prompting/Supervsion or Set-up  Toileting Initial:  3 - Moderate Assistance  Toileting Current:  6 - Modified Independent   Toileting Description:  Grab bar, Supervision for safety (SBA w/ GB)  Toilet Transfer Initial:  5 - Standby Prompting/Supervision or Set-up  Toilet Transfer Current:  6 - Modified Independent   Toilet Transfer Description:  5 - Standby Prompting/Supervision or Set-up  Tub / Shower Transfer Initial:  5 - Standby Prompting/Supervision or Set-up  Tub / Shower Transfer Current:  5 - Standby Prompting/Supervision or Set-up   Tub / Shower Transfer Description:  Grab bar, Adaptive equipment, Supervision for safety, Set-up of equipment, Initial preparation for task, Verbal cueing (setup/sba w/ grab bar and commode w/ cue to park w/c closer to commode)  IADL:  Not yet addressed  Family Training/Education:  None  DME/DC Recommendations:  Patient states he will be borrowing a 3 in 1 commode to use as a shower chair; no further OT needs post rehab    Strengths:  Alert and oriented, Effective communication skills, Good carryover of learning, Good insight into  deficits/needs, Independent PLOF, Making steady progress towards goals, Manages pain appropriately, Motivated for self care and independence, Pleasant and cooperative, Supportive family and Willingly participates in therapeutic activities  Barriers:  Other: stairs in home, TTWB R LE, spinal precautions     # of short term goals set= 4    # of short term goals met= 0          Occupational Therapy Goals           Problem: Bathing     Dates: Start: 03/27/19       Goal: STG-Within one week, patient will bathe     Dates: Start: 03/27/19       Description: 1) Individualized Goal:  Body setup using AE/AD as needed.  2) Interventions:  OT Group Therapy, OT Self Care/ADL, OT Community Reintegration, OT Neuro Re-Ed/Balance, OT Therapeutic Activity, OT Evaluation and OT Therapeutic Exercise       Note:     Goal Note filed on 03/28/19 0821 by Jamari Bautista MS,OTR/L    Goal: STG-Within one week, patient will bathe  Outcome: NOT MET  Min A                  Problem: Dressing     Dates: Start: 03/27/19       Goal: STG-Within one week, patient will dress LB     Dates: Start: 03/27/19       Description: 1) Individualized Goal:  With supervision using AE as needed.  2) Interventions:  OT Group Therapy, OT Self Care/ADL, OT Community Reintegration, OT Neuro Re-Ed/Balance, OT Therapeutic Activity, OT Evaluation and OT Therapeutic Exercise       Note:     Goal Note filed on 03/28/19 0821 by Jamari Bautista, MS,OTR/L    Goal: STG-Within one week, patient will dress LB  Outcome: NOT MET  Min A                  Problem: Functional Transfers     Dates: Start: 03/27/19       Goal: STG-Within one week, patient will transfer to toilet     Dates: Start: 03/27/19       Description: 1) Individualized Goal:  With supervision using grab bar.  2) Interventions:  OT Group Therapy, OT Self Care/ADL, OT Community Reintegration, OT Neuro Re-Ed/Balance, OT Therapeutic Activity, OT Evaluation and OT Therapeutic Exercise       Note:     Goal Note  filed on 03/28/19 0821 by Jamari Bautista MS,OTR/L    Goal: STG-Within one week, patient will transfer to toilet  Outcome: NOT MET  SBA with grab bar                  Problem: OT Long Term Goals     Dates: Start: 03/27/19       Goal: LTG-By discharge, patient will complete basic self care tasks     Dates: Start: 03/27/19       Description: 1) Individualized Goal:  Mod I using AE/AD/techniques as needed.  2) Interventions:  OT Group Therapy, OT Self Care/ADL, OT Community Reintegration, OT Neuro Re-Ed/Balance, OT Therapeutic Activity, OT Evaluation and OT Therapeutic Exercise             Goal: LTG-By discharge, patient will perform bathroom transfers     Dates: Start: 03/27/19       Description: 1) Individualized Goal:  Mod I using AE/AD/techniques as needed.  2) Interventions:  OT Group Therapy, OT Self Care/ADL, OT Community Reintegration, OT Neuro Re-Ed/Balance, OT Therapeutic Activity, OT Evaluation and OT Therapeutic Exercise               Problem: Toileting     Dates: Start: 03/27/19       Goal: STG-Within one week, patient will complete toileting tasks     Dates: Start: 03/27/19       Description: 1) Individualized Goal:  With supervision using toilet and grab bar.  2) Interventions:  OT Group Therapy, OT Self Care/ADL, OT Community Reintegration, OT Neuro Re-Ed/Balance, OT Therapeutic Activity, OT Evaluation and OT Therapeutic Exercise       Note:     Goal Note filed on 03/28/19 0821 by Jamari Bautista MS,OTR/L    Goal: STG-Within one week, patient will complete toileting tasks  Outcome: NOT MET  SBA                      Section completed by:  Jamari Bautista MS,OTR/L

## 2019-03-28 NOTE — PROGRESS NOTES
Received bedside shift report from Jewel MYERS RN regarding patient and assumed care. Patient awake, calm and stable, currently positioned in bed for comfort and safety; call light within reach. Denies pain or discomfort at this time. Will continue to monitor.
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
## 2019-03-28 NOTE — THERAPY
"Occupational Therapy  Daily Treatment     Patient Name: Baljinder Mendieta  Age:  58 y.o., Sex:  male  Medical Record #: [id number]  Today's Date: 3/28/2019     Precautions  Precautions: Fall Risk, TLSO (Thoracolumbosacral orthosis), Toe Touch Weight Bearing Right Lower Extremity  Comments: brace instructions.   on OOB more than 5 minutes / put on if bed greater than 45 degrees.          Subjective    \" They said I can leave it can be off for up to 5 minutes .\"  When asked why he  Was found  toileting and transfer with out TLSO on .      Objective       19 1401   Precautions   Precautions Fall Risk;TLSO (Thoracolumbosacral orthosis);Toe Touch Weight Bearing Right Lower Extremity   Comments brace instructions.   on OOB more than 5 minutes / put on if bed greater than 45 degrees.    Sitting Upper Body Exercises   Upper Extremity Bike Level 3 Resistance  (x 5 minutes x 2 )   Interdisciplinary Plan of Care Collaboration   Patient Position at End of Therapy Other (Comments)  (self propel w/c to room )   OT Total Time Spent   OT Individual Total Time Spent (Mins) 30   OT Charge Group   OT Self Care / ADL 1   OT Therapeutic Exercise  1       FIM Toiletin - Modified Independent  Toileting Description:       FIM Toilet Transfer Score:  6 - Modified Independent  Toilet Transfer Description:       FIM Wheelchair Score:  6 - Modified Independent  Wheelchair Description:   (w/c <-> [facility] [employer]   no assist )      Assessment    Completed tx  No complaints.   Good safety and  Back precaution adherence with TLSO off for toilet and transfer.     Plan    ADL  IADL  Related mobility   Strength /endurance building incorporating TTWB right LE and  Back precautions    "

## 2019-03-28 NOTE — CARE PLAN
Problem: Safety  Goal: Will remain free from injury  Outcome: PROGRESSING AS EXPECTED  Patient is Mod I in room with w/c. Have good safety awareness. Encouraged to call for assistance when needed.     Problem: Pain Management  Goal: Pain level will decrease to patient's comfort goal  Outcome: PROGRESSING AS EXPECTED  Patient c/o leg pain 4/10. Medicated him with tylenol for pain with positive result. Up and participate in therapies. Tolerated well.
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
## 2019-03-28 NOTE — CARE PLAN
Problem: Bathing  Goal: STG-Within one week, patient will bathe  1) Individualized Goal:  Body setup using AE/AD as needed.  2) Interventions:  OT Group Therapy, OT Self Care/ADL, OT Community Reintegration, OT Neuro Re-Ed/Balance, OT Therapeutic Activity, OT Evaluation and OT Therapeutic Exercise     Outcome: NOT MET  Min A    Problem: Dressing  Goal: STG-Within one week, patient will dress LB  1) Individualized Goal:  With supervision using AE as needed.  2) Interventions:  OT Group Therapy, OT Self Care/ADL, OT Community Reintegration, OT Neuro Re-Ed/Balance, OT Therapeutic Activity, OT Evaluation and OT Therapeutic Exercise     Outcome: NOT MET  Min A    Problem: Toileting  Goal: STG-Within one week, patient will complete toileting tasks  1) Individualized Goal:  With supervision using toilet and grab bar.  2) Interventions:  OT Group Therapy, OT Self Care/ADL, OT Community Reintegration, OT Neuro Re-Ed/Balance, OT Therapeutic Activity, OT Evaluation and OT Therapeutic Exercise     Outcome: NOT MET  SBA    Problem: Functional Transfers  Goal: STG-Within one week, patient will transfer to toilet  1) Individualized Goal:  With supervision using grab bar.  2) Interventions:  OT Group Therapy, OT Self Care/ADL, OT Community Reintegration, OT Neuro Re-Ed/Balance, OT Therapeutic Activity, OT Evaluation and OT Therapeutic Exercise     Outcome: NOT MET  SBA with grab bar

## 2019-03-28 NOTE — THERAPY
"Physical Therapy   Daily Treatment     Patient Name: Baljinder Mendieta  Age:  58 y.o., Sex:  male  Medical Record #: [id number]  Today's Date: 3/28/2019     Precautions  Precautions: Fall Risk, TLSO (Thoracolumbosacral orthosis), Toe Touch Weight Bearing Right Lower Extremity  Comments: TLSO on when OOB >5 min and HOB >45 degrees    Subjective    Pt in bed, agreeable to PT      Objective       03/28/19 0831   Precautions   Precautions Fall Risk;TLSO (Thoracolumbosacral orthosis);Toe Touch Weight Bearing Right Lower Extremity   Comments TLSO on when OOB >5 min and HOB >45 degrees   Sitting Lower Body Exercises   Sit to Stand 1 set of 10  (with single crutch for RUE support and spv)   Nustep Resistance Level 4  (10 min, BUEs and LLE, ROM with RLE only, 442 steps)   Interdisciplinary Plan of Care Collaboration   IDT Collaboration with  Occupational Therapist   Patient Position at End of Therapy Seated  (in therapy gym)   Collaboration Comments pt care passed to OT    PT Total Time Spent   PT Individual Total Time Spent (Mins) 60   PT Charge Group   PT Gait Training 1   PT Therapeutic Exercise 1   PT Therapeutic Activities 2     Pt education/ discussion of axillary crutches vs FWW, use at home, and safety with each device.     FIM Walking Score:  2 - Max Assistance  Walking Description:  Extra time, Requires incidental assist, Assist device/equipment (50 ft, 55 ft, and 130 ft with bilateral axillary crutches and CGA- SBA)    FIM Wheelchair Score:  6 - Modified Independent  Wheelchair Description:       FIM Stairs Score:  2 - Max Assistance  Stairs Description:  Hand rails, Extra time, Requires incidental assist, Ascends/descends 4 to 11 steps (8 6\" steps with 2 HRs and SBA- CGA )      Assessment    Pt with good safety during gait training with axillary crutches this session. Reported concern using crutches on carpet.    Plan    Gait training with axillary crutches, Full body strengthening, balance and endurance activities. "

## 2019-03-28 NOTE — REHAB-COLLABORATIVE ONGOING IDT TEAM NOTE
Weekly Interdisciplinary Team Conference Note    Weekly Interdisciplinary Team Conference # 1  Date:  3/28/2019    Clinicians present and reporting at team conference include the following:   MD: MAGGIE Ny MD    RN:  Jaimie Alas RN   PT:   Ayde Morris, PT, DPT  OT:  Jamari Bautista, MS, OTR/L   ST:  Not Applicable.   CM:  Paty Quiros RN, CCM  REC:  Bjorn Spangler, SOHAS  RT:  Not Applicable  RPh:  [facility] C[facility]  All reporting clinicians have a working knowledge of this patient's plan of care.    Targeted DC Date:  4.1.19     Medical    Patient Active Problem List    Diagnosis Date Noted   • Closed fracture of acetabulum (HCC) 03/16/2019     Priority: High   • Closed fracture of lumbar vertebra (HCC) 03/16/2019     Priority: High   • Traumatic retroperitoneal hematoma 03/16/2019     Priority: High   • Pulmonary nodule, right 03/21/2019     Priority: Medium   • Iron deficiency anemia 03/20/2019     Priority: Medium   • Trauma 03/16/2019     Priority: Low   • Ankylosing spondylitis (HCC) 03/26/2019     Results     ** No results found for the last 24 hours. **           Nursing  Diet/Nutrition:  Regular and Thin Liquids  Medication Administration:  Whole with Liquid Wash  % consumed at meals in last 24 hours:  Consumed 50-75% of meals per documentation.  Meal/Snack Consumption for the past 24 hrs:   Oral Nutrition   03/27/19 1150 Lunch;Between 50-75% Consumed   03/27/19 1000 Breakfast;Between 50-75% Consumed       Snack schedule:  None  Supplement:  Other: N/A  Appetite:  Good  Fluid Intake/Output in past 24 hours: In: 480 [P.O.:480]  Out: - pt up to the toilet to void.  Admit Weight:  Weight: 78 kg (172 lb)  Weight Last 7 Days   [78 kg (172 lb)] 78 kg (172 lb) (03/26 1346)    Pain Issues:    Location:  Back;Pelvis;Leg (03/27 2200)  Right (03/27 2200)         Severity:  Moderate   Scheduled pain medications:  Robaxin   PRN pain medications used in last 24 hours:  acetaminophen (TYLENOL)    Non  Pharmacologic Interventions:  rest  Effectiveness of pain management plan:  good=patient states acceptable comfort after interventions    Bowel:    Bowel Assist Initial Score:  6 - Modified Independent  Bowel Assist Current Score:  6 - Modified Independent  Bowl Accidents in last 7 days:     Last bowel movement: 03/27/19  Stool Description: Medium, Soft     Usual bowel pattern:  daily  Scheduled bowel medications:  docusate sodium (COLACE) and senna-docusate (PERICOLACE or SENOKOT S)   PRN bowel medications used in last 24 hours:  None  Nursing Interventions:  Increased time, Scheduled medication, Supervision  Effectiveness of bowel program:   good=regular, predictable, controlled emptying of bowel  Bladder:    Bladder Assist Initial Score:  5 - Standby Prompting/Supervision or Set-up  Bladder Assist Current Score:  5 - Standby Prompting/Supervision or Set-up  Bladder Accidents in last 7 days:     PVR range for past 24-48 hours:  [1]  ()  Intermittent Catheterization:  N/A  Medications affecting bladder:  None    Time void schedule/voiding pattern:  Voiding every 2-4 hours  Interventions:  Increased time, Supervision, Verbal cueing, Emptying of device  Effectiveness of bladder training:  Good=regular, predictable, emptying of bladder, patient initiates time voiding    Wound:         Patient Lines/Drains/Airways Status    Active Current Wounds     3/18/19 ORIF Right Acetabulum Fx  3/19/19 Lumbar Fusion Posterior  3/19/19 Lumbar Laminectomy Diskectomy                   Interventions:  Staples to right hip and right leg, covered with island dressing.  Back incision with dermabond, covered with island dressing.  Effectiveness of intervention:  wound is improving     Sleep/wake cycle:   Average hours slept:  Sleeps 4-6 hours without waking  Sleep medication usage:  None    Patient/Family Training/Education:  Fall Prevention, General Self Care, Pain Management, Safe Transfers, Skin Care and Wound Care  Discharge Supply  Recommendations:  Wound Care Supplies and Other: Adaptive equipment.  Strengths: Alert and oriented, Able to follow instructions and Effective communication skills   Barriers:   Generalized weakness and Limited mobility            Nursing Problems           Problem: Bowel/Gastric:     Goal: Normal bowel function is maintained or improved           Goal: Will not experience complications related to bowel motility             Problem: Communication     Goal: The ability to communicate needs accurately and effectively will improve             Problem: Discharge Barriers/Planning     Goal: Patient's continuum of care needs will be met             Problem: Infection     Goal: Will remain free from infection             Problem: Knowledge Deficit     Goal: Knowledge of disease process/condition, treatment plan, diagnostic tests, and medications will improve           Goal: Knowledge of the prescribed therapeutic regimen will improve             Problem: Pain Management     Goal: Pain level will decrease to patient's comfort goal             Problem: Safety     Goal: Will remain free from injury           Goal: Will remain free from falls             Problem: Skin Integrity     Goal: Risk for impaired skin integrity will decrease             Problem: Venous Thromboembolism (VTW)/Deep Vein Thrombosis (DVT) Prevention:     Goal: Patient will participate in Venous Thrombosis (VTE)/Deep Vein Thrombosis (DVT)Prevention Measures                  Long Term Goals:   At discharge patient will be able to function safely at home and in the community with support.    Section completed by:  Luis Felipe Gonzalez L.P.N. 2             Mobility  Bed mobility:   SBA- min A  Bed /Chair/Wheelchair Transfer Initial:  4 - Minimal Assistance  Bed /Chair/Wheelchair Transfer Current:  4 - Minimal Assistance   Bed/Chair/Wheelchair Transfer Description:  Assist with one limb, Set-up of equipment, Supervision for safety (Bed <> WC, min A for sit >  "supine for RLE management, SBA for remainder of SPT)  Walk Initial:  2 - Max Assistance  Walk Current:  2 - Max Assistance   Walk Description:  Extra time, Requires incidental assist (65 ft x2 with FWW and SBA- CGA)  Wheelchair Initial:  5 - Standby Prompting/Supervision or Set-up  Wheelchair Current:  5 - Standby Prompting/Supervision or Set-up   Wheelchair Description:  Extra time, Supervision for safety (200 ft, spv using UEs)  Stairs Initial:  1 - Total Assistance  Stairs Current: 1 - Total Assistance   Stairs Description: Requires incidental assist, Walker, Extra time (6\" step x2 with FWW, CGA and verbal cuing )  Patient/Family Training/Education:  TBD  DME/DC Recommendations:  TBD (probably axillary crutches and possibly manual WC rental)  Strengths:  Adequate strength, Independent PLOF, Pleasant and cooperative and Willingly participates in therapeutic activities  Barriers:   Decreased endurance, Limited mobility and Other: TTWB status   # of short term goals set= 3  # of short term goals met= 0 (evaluation completed 3/27)       Physical Therapy Problems           Problem: Mobility     Dates: Start: 03/27/19       Goal: STG-Within one week, patient will ambulate household distance     Dates: Start: 03/27/19       Description: 1) Individualized goal:  100 ft with FWW, SBA  2) Interventions:  PT E Stim Attended, PT Gait Training, PT Therapeutic Exercises, PT TENS Application, PT Neuro Re-Ed/Balance, PT Aquatic Therapy, PT Therapeutic Activity and PT Manual Therapy       Note:     Goal Note filed on 03/28/19 0827 by Ayde Morris, PT    Goal: STG-Within one week, patient will ambulate household distance  Outcome: NOT MET  Eval completed 3/27                Goal: STG-Within one week, patient will ambulate up/down a curb     Dates: Start: 03/27/19       Description: 1) Individualized goal:  6\" curb with FWW 4x with FWW, SBA  2) Interventions: PT E Stim Attended, PT Gait Training, PT Therapeutic Exercises, PT TENS " Application, PT Neuro Re-Ed/Balance, PT Aquatic Therapy, PT Therapeutic Activity and PT Manual Therapy       Note:     Goal Note filed on 03/28/19 0827 by Ayde Morris PT    Goal: STG-Within one week, patient will ambulate up/down a curb  Outcome: NOT MET  Eval completed 3/27                  Problem: Mobility Transfers     Dates: Start: 03/27/19       Goal: STG-Within one week, patient will transfer bed to chair     Dates: Start: 03/27/19       Description: 1) Individualized goal:  spv without AD   2) Interventions: PT E Stim Attended, PT Gait Training, PT Therapeutic Exercises, PT TENS Application, PT Neuro Re-Ed/Balance, PT Aquatic Therapy, PT Therapeutic Activity and PT Manual Therapy       Note:     Goal Note filed on 03/28/19 0827 by Ayde Morris PT    Goal: STG-Within one week, patient will transfer bed to chair  Outcome: NOT MET  Eval completed 3/27                  Problem: PT-Long Term Goals     Dates: Start: 03/27/19       Goal: LTG-By discharge, patient will ambulate     Dates: Start: 03/27/19       Description: 1) Individualized goal:  150 ft with LRAD, mod I   2) Interventions: PT E Stim Attended, PT Gait Training, PT Therapeutic Exercises, PT TENS Application, PT Neuro Re-Ed/Balance, PT Aquatic Therapy, PT Therapeutic Activity and PT Manual Therapy             Goal: LTG-By discharge, patient will transfer one surface to another     Dates: Start: 03/27/19       Description: 1) Individualized goal:  Mod I without AD  2) Interventions: PT E Stim Attended, PT Gait Training, PT Therapeutic Exercises, PT TENS Application, PT Neuro Re-Ed/Balance, PT Aquatic Therapy, PT Therapeutic Activity and PT Manual Therapy             Goal: LTG-By discharge, patient will ambulate up/down flight of stairs     Dates: Start: 03/27/19       Description: 1) Individualized goal:  16 steps with axillary crutches and SBA  2) Interventions: PT E Stim Attended, PT Gait Training, PT Therapeutic Exercises, PT TENS Application, PT  Neuro Re-Ed/Balance, PT Aquatic Therapy, PT Therapeutic Activity and PT Manual Therapy             Goal: LTG-By discharge, patient will transfer in/out of a car     Dates: Start: 03/27/19       Description: 1) Individualized goal:  Mod I with LRAD  2) Interventions: PT E Stim Attended, PT Gait Training, PT Therapeutic Exercises, PT TENS Application, PT Neuro Re-Ed/Balance, PT Aquatic Therapy, PT Therapeutic Activity and PT Manual Therapy                     Section completed by:  Ayde Morris, PT, DPT       Activities of Daily Living  Eating Initial:  7 - Independent  Eating Current:  7 - Independent   Eating Description:     Grooming Initial:  7 - Independent  Grooming Current:  7 - Independent   Grooming Description:     Bathing Initial:  4 - Minimal Assistance  Bathing Current:  4 - Minimal Assistance   Bathing Description:  Adaptive equipment, Hand held shower, Set-up of equipment, Initial preparation for task (assist to wash/dry right lower leg/foot)  Upper Body Dressing Initial:  5 - Standby Prompting/Supervision or Set-up  Upper Body Dressing Current:  5 - Standby Prompting/Supervision or Set-up   Upper Body Dressing Description:  Set-up of equipment  Lower Body Dressing Initial:  3 - Moderate Assistance  Lower Body Dressing Current:  5 - Standby Prompting/Supervsion or Set-up   Lower Body Dressing Description:  5 - Standby Prompting/Supervsion or Set-up  Toileting Initial:  3 - Moderate Assistance  Toileting Current:  5 - Standby Prompting/Supervision or Set-up   Toileting Description:  Grab bar, Supervision for safety (SBA w/ GB)  Toilet Transfer Initial:  5 - Standby Prompting/Supervision or Set-up  Toilet Transfer Current:  5 - Standby Prompting/Supervision or Set-up   Toilet Transfer Description:  5 - Standby Prompting/Supervision or Set-up  Tub / Shower Transfer Initial:  5 - Standby Prompting/Supervision or Set-up  Tub / Shower Transfer Current:  5 - Standby Prompting/Supervision or Set-up   Tub / Shower  Transfer Description:  Grab bar, Adaptive equipment, Supervision for safety, Set-up of equipment, Initial preparation for task, Verbal cueing (setup/sba w/ grab bar and commode w/ cue to park w/c closer to commode)  IADL:  Not yet addressed  Family Training/Education:  None  DME/DC Recommendations:      Strengths:  Alert and oriented, Effective communication skills, Good carryover of learning, Good insight into deficits/needs, Independent PLOF, Making steady progress towards goals, Manages pain appropriately, Motivated for self care and independence, Pleasant and cooperative, Supportive family and Willingly participates in therapeutic activities  Barriers:  Other: stairs in home, TTWB R LE, spinal precautions     # of short term goals set= 4    # of short term goals met= 0          Occupational Therapy Goals           Problem: Bathing     Dates: Start: 03/27/19       Goal: STG-Within one week, patient will bathe     Dates: Start: 03/27/19       Description: 1) Individualized Goal:  Body setup using AE/AD as needed.  2) Interventions:  OT Group Therapy, OT Self Care/ADL, OT Community Reintegration, OT Neuro Re-Ed/Balance, OT Therapeutic Activity, OT Evaluation and OT Therapeutic Exercise       Note:     Goal Note filed on 03/28/19 0821 by Jamari Bautista MS,OTR/L    Goal: STG-Within one week, patient will bathe  Outcome: NOT MET  Min A                  Problem: Dressing     Dates: Start: 03/27/19       Goal: STG-Within one week, patient will dress LB     Dates: Start: 03/27/19       Description: 1) Individualized Goal:  With supervision using AE as needed.  2) Interventions:  OT Group Therapy, OT Self Care/ADL, OT Community Reintegration, OT Neuro Re-Ed/Balance, OT Therapeutic Activity, OT Evaluation and OT Therapeutic Exercise       Note:     Goal Note filed on 03/28/19 0821 by Jamari Bautista MS,OTR/L    Goal: STG-Within one week, patient will dress LB  Outcome: NOT MET  Min A                  Problem:  Functional Transfers     Dates: Start: 03/27/19       Goal: STG-Within one week, patient will transfer to toilet     Dates: Start: 03/27/19       Description: 1) Individualized Goal:  With supervision using grab bar.  2) Interventions:  OT Group Therapy, OT Self Care/ADL, OT Community Reintegration, OT Neuro Re-Ed/Balance, OT Therapeutic Activity, OT Evaluation and OT Therapeutic Exercise       Note:     Goal Note filed on 03/28/19 0821 by Jamari Bautista MS,OTR/L    Goal: STG-Within one week, patient will transfer to toilet  Outcome: NOT MET  SBA with grab bar                  Problem: OT Long Term Goals     Dates: Start: 03/27/19       Goal: LTG-By discharge, patient will complete basic self care tasks     Dates: Start: 03/27/19       Description: 1) Individualized Goal:  Mod I using AE/AD/techniques as needed.  2) Interventions:  OT Group Therapy, OT Self Care/ADL, OT Community Reintegration, OT Neuro Re-Ed/Balance, OT Therapeutic Activity, OT Evaluation and OT Therapeutic Exercise             Goal: LTG-By discharge, patient will perform bathroom transfers     Dates: Start: 03/27/19       Description: 1) Individualized Goal:  Mod I using AE/AD/techniques as needed.  2) Interventions:  OT Group Therapy, OT Self Care/ADL, OT Community Reintegration, OT Neuro Re-Ed/Balance, OT Therapeutic Activity, OT Evaluation and OT Therapeutic Exercise               Problem: Toileting     Dates: Start: 03/27/19       Goal: STG-Within one week, patient will complete toileting tasks     Dates: Start: 03/27/19       Description: 1) Individualized Goal:  With supervision using toilet and grab bar.  2) Interventions:  OT Group Therapy, OT Self Care/ADL, OT Community Reintegration, OT Neuro Re-Ed/Balance, OT Therapeutic Activity, OT Evaluation and OT Therapeutic Exercise       Note:     Goal Note filed on 03/28/19 0821 by Jamari Bautista, MS,OTR/L    Goal: STG-Within one week, patient will complete toileting tasks  Outcome: NOT  MET  SBA                      Section completed by:  Jamari Bautista, MS,OTR/L             REHAB-Pharmacy IDT Team Note by Marcin Kendrick RPH at 3/27/2019  3:44 PM  Version 1 of 1    Author:  Marcin Kendrikc RPH Service:  (none) Author Type:  Pharmacist    Filed:  3/27/2019  3:45 PM Date of Service:  3/27/2019  3:44 PM Status:  Signed    :  Marcin Kendrick RPH (Pharmacist)         Pharmacy   Pharmacy  Antibiotics/Antifungals/Antivirals:  Medication:      Active Orders     None        Route:         None  Stop Date:  N/A  Reason:   Antihypertensives/Cardiac:  Medication:    Orders (72h ago through future)    Start     Ordered    03/26/19 1331  hydrALAZINE (APRESOLINE) tablet 25 mg  EVERY 8 HOURS PRN      03/26/19 1331        Patient Vitals for the past 24 hrs:   BP Pulse   03/27/19 1300 136/84 86   03/27/19 0652 134/82 89   03/26/19 1839 123/66 89       Anticoagulation:  Medication:  lovenox  INR:      Other key medications:      A review of the medication list reveals no issues at this time.    Section completed by:  Marcin Kendrick RPH[WR.1]        Attribution Gonzalez     WR.1 - Marcin Kendrick RPH on 3/27/2019  3:44 PM                      DC Planning    DC destination/dispostion:  Home w/ supportive family to [place], [place].    Referrals: TBD    DC Needs: DME for patient safety & mobility. MD f/u appts. OP therapy.    Barriers to discharge:  Functional mobility deficits: TTWB RLE x10wks, TLSO, spinal precautions.      Strengths: Motivation. PLOF: independent. Supportive family.    Section completed by:  Paty Quiros R.N.    Summary: TTWB RLE x10wk, stair training, gait training w/ crutches.    Physician Summary  MAGGIE Ny MD  participated and led team conference discussion.

## 2019-03-28 NOTE — REHAB-CM IDT TEAM NOTE
Case Management      DC Planning    DC destination/dispostion:  Home w/ supportive family to [place], [place].    Referrals: TBD    DC Needs: DME for patient safety & mobility. MD f/u appts. OP therapy.    Barriers to discharge:  Functional mobility deficits: TTWB RLE x10wks, TLSO, spinal precautions.      Strengths: Motivation. PLOF: independent. Supportive family.    Section completed by:  Paty Quiros R.N.

## 2019-03-28 NOTE — REHAB-PT IDT TEAM NOTE
"Physical Therapy   Mobility  Bed mobility:   SBA- min A  Bed /Chair/Wheelchair Transfer Initial:  4 - Minimal Assistance  Bed /Chair/Wheelchair Transfer Current:  4 - Minimal Assistance   Bed/Chair/Wheelchair Transfer Description:  Assist with one limb, Set-up of equipment, Supervision for safety (Bed <> WC, min A for sit > supine for RLE management, SBA for remainder of SPT)  Walk Initial:  2 - Max Assistance  Walk Current:  2 - Max Assistance   Walk Description:  Extra time, Requires incidental assist (65 ft x2 with FWW and SBA- CGA)  Wheelchair Initial:  5 - Standby Prompting/Supervision or Set-up  Wheelchair Current:  5 - Standby Prompting/Supervision or Set-up   Wheelchair Description:  Extra time, Supervision for safety (200 ft, spv using UEs)  Stairs Initial:  1 - Total Assistance  Stairs Current: 1 - Total Assistance   Stairs Description: Requires incidental assist, Walker, Extra time (6\" step x2 with FWW, CGA and verbal cuing )  Patient/Family Training/Education:  TBD  DME/DC Recommendations:  TBD (probably axillary crutches and possibly manual WC rental)  Strengths:  Adequate strength, Independent PLOF, Pleasant and cooperative and Willingly participates in therapeutic activities  Barriers:   Decreased endurance, Limited mobility and Other: TTWB status   # of short term goals set= 3  # of short term goals met= 0 (evaluation completed 3/27)       Physical Therapy Problems           Problem: Mobility     Dates: Start: 03/27/19       Goal: STG-Within one week, patient will ambulate household distance     Dates: Start: 03/27/19       Description: 1) Individualized goal:  100 ft with FWW, SBA  2) Interventions:  PT E Stim Attended, PT Gait Training, PT Therapeutic Exercises, PT TENS Application, PT Neuro Re-Ed/Balance, PT Aquatic Therapy, PT Therapeutic Activity and PT Manual Therapy       Note:     Goal Note filed on 03/28/19 0827 by Ayde Morris, PT    Goal: STG-Within one week, patient will ambulate household " "distance  Outcome: NOT MET  Eval completed 3/27                Goal: STG-Within one week, patient will ambulate up/down a curb     Dates: Start: 03/27/19       Description: 1) Individualized goal:  6\" curb with FWW 4x with FWW, SBA  2) Interventions: PT E Stim Attended, PT Gait Training, PT Therapeutic Exercises, PT TENS Application, PT Neuro Re-Ed/Balance, PT Aquatic Therapy, PT Therapeutic Activity and PT Manual Therapy       Note:     Goal Note filed on 03/28/19 0827 by Ayde Morris, PT    Goal: STG-Within one week, patient will ambulate up/down a curb  Outcome: NOT MET  Eval completed 3/27                  Problem: Mobility Transfers     Dates: Start: 03/27/19       Goal: STG-Within one week, patient will transfer bed to chair     Dates: Start: 03/27/19       Description: 1) Individualized goal:  spv without AD   2) Interventions: PT E Stim Attended, PT Gait Training, PT Therapeutic Exercises, PT TENS Application, PT Neuro Re-Ed/Balance, PT Aquatic Therapy, PT Therapeutic Activity and PT Manual Therapy       Note:     Goal Note filed on 03/28/19 0827 by Ayde Morris, PT    Goal: STG-Within one week, patient will transfer bed to chair  Outcome: NOT MET  Eval completed 3/27                  Problem: PT-Long Term Goals     Dates: Start: 03/27/19       Goal: LTG-By discharge, patient will ambulate     Dates: Start: 03/27/19       Description: 1) Individualized goal:  150 ft with LRAD, mod I   2) Interventions: PT E Stim Attended, PT Gait Training, PT Therapeutic Exercises, PT TENS Application, PT Neuro Re-Ed/Balance, PT Aquatic Therapy, PT Therapeutic Activity and PT Manual Therapy             Goal: LTG-By discharge, patient will transfer one surface to another     Dates: Start: 03/27/19       Description: 1) Individualized goal:  Mod I without AD  2) Interventions: PT E Stim Attended, PT Gait Training, PT Therapeutic Exercises, PT TENS Application, PT Neuro Re-Ed/Balance, PT Aquatic Therapy, PT Therapeutic Activity and " PT Manual Therapy             Goal: LTG-By discharge, patient will ambulate up/down flight of stairs     Dates: Start: 03/27/19       Description: 1) Individualized goal:  16 steps with axillary crutches and SBA  2) Interventions: PT E Stim Attended, PT Gait Training, PT Therapeutic Exercises, PT TENS Application, PT Neuro Re-Ed/Balance, PT Aquatic Therapy, PT Therapeutic Activity and PT Manual Therapy             Goal: LTG-By discharge, patient will transfer in/out of a car     Dates: Start: 03/27/19       Description: 1) Individualized goal:  Mod I with LRAD  2) Interventions: PT E Stim Attended, PT Gait Training, PT Therapeutic Exercises, PT TENS Application, PT Neuro Re-Ed/Balance, PT Aquatic Therapy, PT Therapeutic Activity and PT Manual Therapy                     Section completed by:  Ayde Morris, PT, DPT

## 2019-03-28 NOTE — THERAPY
"Occupational Therapy  Daily Treatment     Patient Name: Baljinder Mendieta  Age:  58 y.o., Sex:  male  Medical Record #: [id number]  Today's Date: 3/28/2019     Precautions  Precautions: Fall Risk, TLSO (Thoracolumbosacral orthosis), Toe Touch Weight Bearing Right Lower Extremity  Comments: TLSO on when OOB >5 min and HOB >45 degrees         Subjective    \"I did everything on my own last night anyway.\" re: in/out of bed and toileting     Objective       03/28/19 0931   Precautions   Precautions Fall Risk;TLSO (Thoracolumbosacral orthosis);Toe Touch Weight Bearing Right Lower Extremity   Comments TLSO on when OOB >5 min and HOB >45 degrees; mod I in room at w/c level   Sitting Upper Body Exercises   Chest Press 3 sets of 10;Bilateral;Weight (See Comments for lbs)  (15, 20, 25 lbs)   Lat Pull 3 sets of 10;Bilateral;Weight (See Comments for lbs)  (35, 40, 40 lbs)   Bilateral Row 3 sets of 10;Bilateral;Weight (See Comments for lbs)  (15, 20, 25 lbs)   IADL Treatments   Kitchen Mobility Education Educated on safe kitchen mobility techniques using FWW and axillary crutches with education on how to maintain TTWB to R LE and spinal precautions.  Education on how to manage crutches while reaching for items and how to use counter for support.  Pt then mobilized around kitchen with crutches and FWW and CGA/SBA while retrieving iems from high/low cupboards, counter and various appliances.   Interdisciplinary Plan of Care Collaboration   IDT Collaboration with  Physical Therapist   Collaboration Comments re: CLOF and clearing for mod I in room from w/c   OT Total Time Spent   OT Individual Total Time Spent (Mins) 60   OT Charge Group   OT Self Care / ADL 1   OT Therapy Activity 2   OT Therapeutic Exercise  1     Education on how to complete stall shower transfer using FWW and shower chair to simulate home environment.  Patient completed transfer x 2 (dry) with CGA and FWW.  Cleared for mod I in room from w/c level with order placed " in chart and white board updated.    FIM Bed/Chair/Wheelchair Transfers Score: 6 - Modified Independent  Bed/Chair/Wheelchair Transfers Description:  Adaptive equipment (mod I w/c <> bed with bedrail, mod I bed mobility with rail)    FIM Toilet Transfer Score:  6 - Modified Independent  Toilet Transfer Description:  Grab bar (mod I w/ grab bar)      Assessment    Cleared for mod I in room from w/c level.  Needs further practice with dry stall shower transfer using FWW and kitchen mobility/meal prep from FWW or crutch level.    Plan    Standing tolerance/balance, continued practice with kitchen mobility with FWW and crutches, meal prep, strength/endurance.

## 2019-03-28 NOTE — CARE PLAN
Problem: Communication  Goal: The ability to communicate needs accurately and effectively will improve  Makes needs known to staff.  Encouraged to use call light for staff assist.    Problem: Safety  Goal: Will remain free from falls  Call light kept within reach and encouraged to use for assistance and with toileting needs. Encouraged to call staff and to wait for staff before transfers.    Problem: Pain Management  Goal: Pain level will decrease to patient's comfort goal  Complains of pain to back, pelvis and leg.  Medicated tonight with scheduled Robaxin and prn Tylenol, has + relief.  See MAR and doc flow sheet.  Will continue to monitor patient.

## 2019-03-28 NOTE — THERAPY
Physical Therapy   Daily Treatment     Patient Name: Baljinder Mendieta  Age:  58 y.o., Sex:  male  Medical Record #: [id number]  Today's Date: 3/28/2019     Precautions  Precautions: Fall Risk, TLSO (Thoracolumbosacral orthosis), Toe Touch Weight Bearing Right Lower Extremity  Comments: TLSO on when OOB >5 min and HOB >45 degrees    Subjective    Pt found laying in bed. Reports 1/10 pain and busy morning with therapy.     Objective       03/28/19 1101   Precautions   Precautions Fall Risk;TLSO (Thoracolumbosacral orthosis);Toe Touch Weight Bearing Right Lower Extremity   Comments TLSO on when OOB >5 min and HOB >45 degrees   Vitals   Vitals Comments PT monitored for signs and symptoms of nausea, dyspnea, and dizziness throughout therapy session. None noted.   Pain 0 - 10 Group   Therapist Pain Assessment 1;Post Activity Pain Same as Prior to Activity   Non Verbal Descriptors   Non Verbal Scale  Calm   Cognition    Cognition / Consciousness WDL   Level of Consciousness Alert   Supine Lower Body Exercise   Hip Abduction 2 sets of 15;Bilateral;Other Resistance (See Comments)  (Green band)   Heel Slide 1 set of 10;Bilateral   Ankle Pumps 1 set of 10;Bilateral   Other Exercises R ankle DF stretch 2x 1min with sheet; Supine marches with abdominal breathing 3x 15   Bed Mobility    Supine to Sit Stand by Assist   Sit to Supine Stand by Assist   Sit to Stand Stand by Assist   Scooting Supervised   Rolling Supervised   Interdisciplinary Plan of Care Collaboration   IDT Collaboration with  Physician   Patient Position at End of Therapy Seated;Other (Comments)  (Cafeteria for lunch)   Collaboration Comments Plan of care rounds with MD   PT Total Time Spent   PT Individual Total Time Spent (Mins) 30   PT Charge Group   PT Therapeutic Exercise 2     Other:  ** PT observed patient independent with donning and doffing TLSO       FIM Bed/Chair/Wheelchair Transfers Score: 5 - Standby Prompting/Supervision or Set-up  Bed/Chair/Wheelchair  Transfers Description:  Adaptive equipment, Increased time (Pt completed be>chair transfer SBA SPT with TTWB precautions on RLE.)    FIM Wheelchair Score:  6 - Modified Independent  Wheelchair Description:   (Pt propelled ' from room to cafeteria. Extra time needed for setup.)    FIM Stairs Score:  0 - Not tested,unsafe activity  Stairs Description:         Assessment    Patient demonstrated proper compliance with precautions throughout therapy session. RLE improve volitional movement with repetition of supine exercises.     Plan    Gait training with axillary crutches, full body strengthening,pre-stair training, balance, and endurance activities.

## 2019-03-28 NOTE — CARE PLAN
"Problem: Mobility  Goal: STG-Within one week, patient will ambulate household distance  1) Individualized goal:  100 ft with FWW, SBA  2) Interventions:  PT E Stim Attended, PT Gait Training, PT Therapeutic Exercises, PT TENS Application, PT Neuro Re-Ed/Balance, PT Aquatic Therapy, PT Therapeutic Activity and PT Manual Therapy     Outcome: NOT MET  Irving completed 3/27  Goal: STG-Within one week, patient will ambulate up/down a curb  1) Individualized goal:  6\" curb with FWW 4x with FWW, SBA  2) Interventions: PT E Stim Attended, PT Gait Training, PT Therapeutic Exercises, PT TENS Application, PT Neuro Re-Ed/Balance, PT Aquatic Therapy, PT Therapeutic Activity and PT Manual Therapy     Outcome: NOT MET  Eval completed 3/27    Problem: Mobility Transfers  Goal: STG-Within one week, patient will transfer bed to chair  1) Individualized goal:  spv without AD   2) Interventions: PT E Stim Attended, PT Gait Training, PT Therapeutic Exercises, PT TENS Application, PT Neuro Re-Ed/Balance, PT Aquatic Therapy, PT Therapeutic Activity and PT Manual Therapy     Outcome: NOT MET  Irving completed 3/27      "

## 2019-03-29 ENCOUNTER — APPOINTMENT (OUTPATIENT)
Dept: RADIOLOGY | Facility: REHABILITATION | Age: 59
DRG: 561 | End: 2019-03-29
Attending: PHYSICAL MEDICINE & REHABILITATION
Payer: COMMERCIAL

## 2019-03-29 PROCEDURE — 97535 SELF CARE MNGMENT TRAINING: CPT

## 2019-03-29 PROCEDURE — A9270 NON-COVERED ITEM OR SERVICE: HCPCS | Performed by: PHYSICAL MEDICINE & REHABILITATION

## 2019-03-29 PROCEDURE — 770010 HCHG ROOM/CARE - REHAB SEMI PRIVAT*

## 2019-03-29 PROCEDURE — 97530 THERAPEUTIC ACTIVITIES: CPT

## 2019-03-29 PROCEDURE — 700112 HCHG RX REV CODE 229: Performed by: PHYSICAL MEDICINE & REHABILITATION

## 2019-03-29 PROCEDURE — 97110 THERAPEUTIC EXERCISES: CPT

## 2019-03-29 PROCEDURE — 73620 X-RAY EXAM OF FOOT: CPT | Mod: RT

## 2019-03-29 PROCEDURE — 700102 HCHG RX REV CODE 250 W/ 637 OVERRIDE(OP): Performed by: PHYSICAL MEDICINE & REHABILITATION

## 2019-03-29 PROCEDURE — 700111 HCHG RX REV CODE 636 W/ 250 OVERRIDE (IP): Performed by: PHYSICAL MEDICINE & REHABILITATION

## 2019-03-29 PROCEDURE — 99233 SBSQ HOSP IP/OBS HIGH 50: CPT | Performed by: PHYSICAL MEDICINE & REHABILITATION

## 2019-03-29 RX ORDER — PREDNISONE 20 MG/1
40 TABLET ORAL DAILY
Status: DISCONTINUED | OUTPATIENT
Start: 2019-03-29 | End: 2019-04-01 | Stop reason: HOSPADM

## 2019-03-29 RX ORDER — BENZOCAINE/MENTHOL 6 MG-10 MG
LOZENGE MUCOUS MEMBRANE 2 TIMES DAILY PRN
Status: DISCONTINUED | OUTPATIENT
Start: 2019-03-29 | End: 2019-04-01 | Stop reason: HOSPADM

## 2019-03-29 RX ADMIN — OMEPRAZOLE 20 MG: 20 CAPSULE, DELAYED RELEASE ORAL at 08:25

## 2019-03-29 RX ADMIN — ENOXAPARIN SODIUM 30 MG: 100 INJECTION SUBCUTANEOUS at 21:39

## 2019-03-29 RX ADMIN — HYDROCORTISONE: 1 CREAM TOPICAL at 14:15

## 2019-03-29 RX ADMIN — METHOCARBAMOL 750 MG: 750 TABLET ORAL at 17:39

## 2019-03-29 RX ADMIN — PREDNISONE 40 MG: 20 TABLET ORAL at 14:26

## 2019-03-29 RX ADMIN — METHOCARBAMOL 750 MG: 750 TABLET ORAL at 08:25

## 2019-03-29 RX ADMIN — TRAMADOL HYDROCHLORIDE 50 MG: 50 TABLET, COATED ORAL at 12:02

## 2019-03-29 RX ADMIN — DOCUSATE SODIUM 100 MG: 100 CAPSULE, LIQUID FILLED ORAL at 21:38

## 2019-03-29 RX ADMIN — METHOCARBAMOL 750 MG: 750 TABLET ORAL at 12:00

## 2019-03-29 RX ADMIN — TRAMADOL HYDROCHLORIDE 50 MG: 50 TABLET, COATED ORAL at 21:39

## 2019-03-29 RX ADMIN — TRAMADOL HYDROCHLORIDE 50 MG: 50 TABLET, COATED ORAL at 06:34

## 2019-03-29 RX ADMIN — ENOXAPARIN SODIUM 30 MG: 100 INJECTION SUBCUTANEOUS at 08:25

## 2019-03-29 RX ADMIN — METHOCARBAMOL 750 MG: 750 TABLET ORAL at 21:38

## 2019-03-29 NOTE — PROGRESS NOTES
Received patient during shift change, report rec'd from day shift RN. Resting in bed, VS stable on room air. Per report continent of B&B, Mod-I w/w/c in room. A&O x 4, able to make needs known. Bed in low position, call light within reach.

## 2019-03-29 NOTE — DISCHARGE PLANNING
"Met w/ patient to review IDT recommendations & targeted DC date 4.1.19. Patient pleased w/ pending DC date. Concerned about \"gout in right foot.\" States \"felt like I was doing good & then this (gout) happened & set me back.\" Informed Dr. Aldana's office states they are \"out of network w/ [facility].\" Patient will need to pay privately for post acute care. Patient asking if in network ortho provider avail. Will attempt to contact customer service for in-network provider list. States \"lilia has purchased crutches for me already.\" Questions answered. Emotional support provided.  "

## 2019-03-29 NOTE — PROGRESS NOTES
Received shift report and assumed care of patient.  Patient awake, calm and stable, currently positioned in bed for comfort and safety; call light within reach. Will continue to monitor.

## 2019-03-29 NOTE — THERAPY
"Physical Therapy   Daily Treatment     Patient Name: Baljinder Mendieta  Age:  58 y.o., Sex:  male  Medical Record #: [id number]  Today's Date: 3/29/2019     Precautions  Precautions: Fall Risk, TLSO (Thoracolumbosacral orthosis), Toe Touch Weight Bearing Right Lower Extremity  Comments: TLSO on when OOB >5 min and HOB >45 degrees, R great toe pain (new onset)    Subjective    Patient agreeable to PT. Reports RLE pain, worse in standing but present at all times since midnight. Throbbing pain in R great toe, 4/10. Reports RLE proximal tibial pain with seated knee extension.        Objective         03/29/19 0831   Precautions   Precautions Fall Risk;TLSO (Thoracolumbosacral orthosis);Toe Touch Weight Bearing Right Lower Extremity   Comments TLSO on when OOB > 5 minutes and when HOB > 45 degrees   Pain 0 - 10 Group   Therapist Pain Assessment 4;Prior to Activity;During Activity;Post Activity  (R great toe, \"throbbing\")   Sitting Lower Body Exercises   Long Arc Quad 1 set of 10;Right  (lacking ~20 degrees ext s/p proximal tibial pain)   Standing Lower Body Exercises   Hamstring Curl 3 sets of 10;Right    Hip Flexion 3 sets of 10;Right    Hip Abduction 3 sets of 10;Right    Other Exercises above ex in // bars, cues to minimize UE support and for upright posture; seated break in between reps   PT Total Time Spent   PT Individual Total Time Spent (Mins) 30   PT Charge Group   PT Therapeutic Exercise 2       FIM Bed/Chair/Wheelchair Transfers Score: 6 - Modified Independent  Bed/Chair/Wheelchair Transfers Description:   (MOD INDEP bed mobility and stand pivot to w/c, good maintenance of TTWB RLE. INDEP don TLSO. )      FIM Wheelchair Score:  6 - Modified Independent  Wheelchair Description:   (MOD INDEP x200 ft x2 reps w/BUEs. )      Assessment    Patient with constant RLE pain today and R great toe \"throbbing\" pain, worse in standing. Able to complete all exercises with minimal cues.    Plan    Gait training with axillary " crutches, Full body strengthening, balance and endurance activities.

## 2019-03-29 NOTE — THERAPY
"Occupational Therapy  Daily Treatment     Patient Name: Baljinder Mendieta  Age:  58 y.o., Sex:  male  Medical Record #: [id number]  Today's Date: 3/29/2019     Precautions  Precautions: (P) Fall Risk, TLSO (Thoracolumbosacral orthosis), Toe Touch Weight Bearing Right Lower Extremity  Comments: (P) TLSO on when OOB >5 min and HOB >45 degrees, R great toe pain (new onset)         Subjective    \"My big toe on my right foot started hurting last night and has continued today.  It's swollen as well.\"  Patient states he does have a history of gout.     Objective       19 1001   Precautions   Precautions Fall Risk;TLSO (Thoracolumbosacral orthosis);Toe Touch Weight Bearing Right Lower Extremity   Comments TLSO on when OOB >5 min and HOB >45 degrees, R great toe pain (new onset)   Bed Mobility    Supine to Sit Modified Independent   Scooting Independent   Rolling Modified Independent   Interdisciplinary Plan of Care Collaboration   IDT Collaboration with  Nursing;Physician   Patient Position at End of Therapy Seated  (at sink)   Collaboration Comments RN changed dressings after shower; OT informed Dr. Ny of patient's R toe pain and he assessed   OT Total Time Spent   OT Individual Total Time Spent (Mins) 60   OT Charge Group   OT Self Care / ADL 4       FIM Eating Score:  7 - Independent  Eating Description:       FIM Grooming Score:  7 - Independent  Grooming Description:       FIM Bathing Score:  6 - Modified Independent  Bathing Description:       FIM Upper Body Dressin - Independent  Upper Body Dressing Description:   (independent to gather clothing from EOB, don/doff )    FIM Lower Body Dressing Score:  4 - Minimal Assistance  Lower Body Dressing Description:  Reacher (required assist to don right sock due to great toe pain, able to don/doff L sock, underwear and shorts)    FIM Tub/Shower Transfers Score:  5 - Standby Prompting/Supervision or Set-up  Tub/Shower Transfers Description:  Supervision for " safety, Grab bar, Adaptive equipment (supervised and setup with grab bar and shower commode chair)    FIM Comprehension Score:  7 - Independent  Comprehension Description:       FIM Expression Score:  7 - Independent  Expression Description:       FIM Social Interaction Score:  7 - Independent  Social Interaction Description:       FIM Problem Solving Score:  7 - Independent  Problem Solving Description:       FIM Memory Score:  7 - Independent  Memory Description:         Assessment    Mod I with ADL routine, though did require assist to don right sock due to right great toe pain.    Plan    Educate patient on where he can purchase AE for LB dressing.
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
## 2019-03-29 NOTE — CARE PLAN
Problem: Bowel/Gastric:  Goal: Normal bowel function is maintained or improved  Outcome: PROGRESSING AS EXPECTED  Patient refused bowel med this am. Denied any s/s of constipation. Last BM was on 3/28.    Problem: Pain Management  Goal: Pain level will decrease to patient's comfort goal  Outcome: PROGRESSING AS EXPECTED  Patient c/o 4/10 back and leg pain. Medicated him with tramadol for pain with positive result. C/o right big toe pain, Dr Burch assessed. Started patient on Prednisone for gout flare. Will continue to monitor.

## 2019-03-29 NOTE — THERAPY
"Recreational Therapy  Daily Treatment     Patient Name: Baljinder Mendieta  AGE:  58 y.o., SEX:  male  Medical Record #: [id number]  Today's Date: 3/29/2019       Subjective    \"Hard for a kids game\" He shared on having gout in his right foot and how he was unable to tolerate pain while standing.      Objective       03/29/19 1613   Functional Ability Status - Cognitive   Attention Span Remains on Task   Comprehension Follows Three Step Commands   Judgment Able to Make Independent Decisions   Functional Ability Status - Emotional    Affect Appropriate   Mood Appropriate   Behavior Appropriate   Skilled Intervention    Skilled Intervention Relaxation / Coping Skills;Cognitive Leisure   Skilled Intervention Comments [place] maze   Interdisciplinary Plan of Care Collaboration   IDT Collaboration with  Nursing   Patient Position at End of Therapy Seated   Collaboration Comments With nursing giving medication at end of session.    Treatment Time   Total Time Spent (mins) 30   Procedural Tracking   Procedural Tracking Cognitive Skills Training;Fine Motor Functional Leisure Skills       Assessment    He remained seated during session due to pain in R foot. He was given a cognitive leisure activity. He stated that he was having some difficulty because he stated he has dyslexia.     Plan    Discharge Pt as he is discharging 4/1.  "

## 2019-03-29 NOTE — CARE PLAN
Problem: Safety  Goal: Will remain free from injury  Pt Mod-I in room using w/c. Encourage to use call light for assist as needed. Verbally confirmed understanding. Call light within reach, bed in low position.    Problem: Pain Management  Goal: Pain level will decrease to patient's comfort goal  Request for PRN tylenol at HS for mild pain. Good effect. Upon reassess, resting in bed, eyes closed, resp even, unlabored.

## 2019-03-30 LAB
ALBUMIN SERPL BCP-MCNC: 3.4 G/DL (ref 3.2–4.9)
ALBUMIN/GLOB SERPL: 1.5 G/DL
ALP SERPL-CCNC: 87 U/L (ref 30–99)
ALT SERPL-CCNC: 49 U/L (ref 2–50)
ANION GAP SERPL CALC-SCNC: 8 MMOL/L (ref 0–11.9)
AST SERPL-CCNC: 15 U/L (ref 12–45)
BASOPHILS # BLD AUTO: 0.5 % (ref 0–1.8)
BASOPHILS # BLD: 0.04 K/UL (ref 0–0.12)
BILIRUB SERPL-MCNC: 0.7 MG/DL (ref 0.1–1.5)
BUN SERPL-MCNC: 15 MG/DL (ref 8–22)
CALCIUM SERPL-MCNC: 8.6 MG/DL (ref 8.5–10.5)
CHLORIDE SERPL-SCNC: 105 MMOL/L (ref 96–112)
CO2 SERPL-SCNC: 26 MMOL/L (ref 20–33)
CREAT SERPL-MCNC: 0.66 MG/DL (ref 0.5–1.4)
EOSINOPHIL # BLD AUTO: 0.1 K/UL (ref 0–0.51)
EOSINOPHIL NFR BLD: 1.2 % (ref 0–6.9)
ERYTHROCYTE [DISTWIDTH] IN BLOOD BY AUTOMATED COUNT: 47 FL (ref 35.9–50)
GLOBULIN SER CALC-MCNC: 2.2 G/DL (ref 1.9–3.5)
GLUCOSE SERPL-MCNC: 106 MG/DL (ref 65–99)
HCT VFR BLD AUTO: 31.9 % (ref 42–52)
HGB BLD-MCNC: 10.6 G/DL (ref 14–18)
IMM GRANULOCYTES # BLD AUTO: 0.16 K/UL (ref 0–0.11)
IMM GRANULOCYTES NFR BLD AUTO: 1.9 % (ref 0–0.9)
LYMPHOCYTES # BLD AUTO: 1.39 K/UL (ref 1–4.8)
LYMPHOCYTES NFR BLD: 16.1 % (ref 22–41)
MCH RBC QN AUTO: 31.7 PG (ref 27–33)
MCHC RBC AUTO-ENTMCNC: 33.2 G/DL (ref 33.7–35.3)
MCV RBC AUTO: 95.5 FL (ref 81.4–97.8)
MONOCYTES # BLD AUTO: 0.7 K/UL (ref 0–0.85)
MONOCYTES NFR BLD AUTO: 8.1 % (ref 0–13.4)
NEUTROPHILS # BLD AUTO: 6.23 K/UL (ref 1.82–7.42)
NEUTROPHILS NFR BLD: 72.2 % (ref 44–72)
NRBC # BLD AUTO: 0 K/UL
NRBC BLD-RTO: 0 /100 WBC
PLATELET # BLD AUTO: 563 K/UL (ref 164–446)
PMV BLD AUTO: 9.5 FL (ref 9–12.9)
POTASSIUM SERPL-SCNC: 3.8 MMOL/L (ref 3.6–5.5)
PROT SERPL-MCNC: 5.6 G/DL (ref 6–8.2)
RBC # BLD AUTO: 3.34 M/UL (ref 4.7–6.1)
SODIUM SERPL-SCNC: 139 MMOL/L (ref 135–145)
URATE SERPL-MCNC: 4.8 MG/DL (ref 2.5–8.3)
WBC # BLD AUTO: 8.6 K/UL (ref 4.8–10.8)

## 2019-03-30 PROCEDURE — 84550 ASSAY OF BLOOD/URIC ACID: CPT

## 2019-03-30 PROCEDURE — A9270 NON-COVERED ITEM OR SERVICE: HCPCS | Performed by: PHYSICAL MEDICINE & REHABILITATION

## 2019-03-30 PROCEDURE — 80053 COMPREHEN METABOLIC PANEL: CPT

## 2019-03-30 PROCEDURE — 770010 HCHG ROOM/CARE - REHAB SEMI PRIVAT*

## 2019-03-30 PROCEDURE — 36415 COLL VENOUS BLD VENIPUNCTURE: CPT

## 2019-03-30 PROCEDURE — 700111 HCHG RX REV CODE 636 W/ 250 OVERRIDE (IP): Performed by: PHYSICAL MEDICINE & REHABILITATION

## 2019-03-30 PROCEDURE — 99232 SBSQ HOSP IP/OBS MODERATE 35: CPT | Performed by: PHYSICAL MEDICINE & REHABILITATION

## 2019-03-30 PROCEDURE — 700102 HCHG RX REV CODE 250 W/ 637 OVERRIDE(OP): Performed by: PHYSICAL MEDICINE & REHABILITATION

## 2019-03-30 PROCEDURE — 85025 COMPLETE CBC W/AUTO DIFF WBC: CPT

## 2019-03-30 RX ADMIN — OMEPRAZOLE 20 MG: 20 CAPSULE, DELAYED RELEASE ORAL at 08:20

## 2019-03-30 RX ADMIN — ENOXAPARIN SODIUM 30 MG: 100 INJECTION SUBCUTANEOUS at 08:17

## 2019-03-30 RX ADMIN — TRAMADOL HYDROCHLORIDE 50 MG: 50 TABLET, COATED ORAL at 08:17

## 2019-03-30 RX ADMIN — TRAMADOL HYDROCHLORIDE 50 MG: 50 TABLET, COATED ORAL at 21:54

## 2019-03-30 RX ADMIN — METHOCARBAMOL 750 MG: 750 TABLET ORAL at 21:53

## 2019-03-30 RX ADMIN — METHOCARBAMOL 750 MG: 750 TABLET ORAL at 12:31

## 2019-03-30 RX ADMIN — ENOXAPARIN SODIUM 30 MG: 100 INJECTION SUBCUTANEOUS at 21:56

## 2019-03-30 RX ADMIN — SENNOSIDES AND DOCUSATE SODIUM 2 TABLET: 8.6; 5 TABLET ORAL at 21:55

## 2019-03-30 RX ADMIN — PREDNISONE 40 MG: 20 TABLET ORAL at 08:17

## 2019-03-30 RX ADMIN — METHOCARBAMOL 750 MG: 750 TABLET ORAL at 08:17

## 2019-03-30 RX ADMIN — METHOCARBAMOL 750 MG: 750 TABLET ORAL at 17:51

## 2019-03-30 NOTE — PROGRESS NOTES
Received patient during shift change, report rec'd from day shift RN. Resting in bed, VS stable on room air. Continent of B&B, Mod-I in room using w/c. A&O x 4, able to make needs known. Bed in low position, call light within reach.
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
## 2019-03-30 NOTE — CARE PLAN
Problem: Safety  Goal: Will remain free from injury  Mod-I in room using w/c. Uses call light for assist as needed. Bed in low position, call light within reach.    Problem: Pain Management  Goal: Pain level will decrease to patient's comfort goal  PRN pain med administered at HS per request. Good effect, upon reassess, resting in bed, eyes closed, resp even, unlabored.

## 2019-03-30 NOTE — PROGRESS NOTES
Received shift report and assumed care of patient.  Patient awake, calm and stable, up into wheelchair. Will continue to monitor.

## 2019-03-30 NOTE — THERAPY
Physical Therapy   Daily Treatment     Patient Name: Baljinder Mendieta  Age:  58 y.o., Sex:  male  Medical Record #: [id number]  Today's Date: 3/29/2019     Precautions  Precautions: Fall Risk, TLSO (Thoracolumbosacral orthosis), Toe Touch Weight Bearing Right Lower Extremity  Comments: TLSO on when OOB >5 min and HOB >45 degrees, R great toe pain (new onset)    Subjective    Patient agreeable to PT.  SO and son present observing during 20 min of session.     Objective       03/29/19 1545   Precautions   Precautions Fall Risk;TLSO (Thoracolumbosacral orthosis);Toe Touch Weight Bearing Right Lower Extremity   Comments TLSO on when OOB >5 min and HOB >45 degrees, R great toe pain (new onset)   Vitals   O2 (LPM) 0   O2 Delivery None (Room Air)   Pain   Intervention Cold Pack   Pain 0 - 10 Group   Location Foot   Location Orientation Right   Description Aching   Comfort Goal 0   Therapist Pain Assessment Prior to Activity;Post Activity Pain Same as Prior to Activity   Sitting Lower Body Exercises   Nustep Resistance Level 4  (no R foot usage 2/2 pain & preference, 1x 17 minutes)   Bed Mobility    Supine to Sit Modified Independent   Sit to Supine Modified Independent   Sit to Stand Modified Independent   Scooting Modified Independent   Rolling Modified Independent   Interdisciplinary Plan of Care Collaboration   IDT Collaboration with  Family / Caregiver   Collaboration Comments Discussed pt's CLOF, pt's family has anxiety with upcoming d/c, discussed lumbar precautions, safety, and WB precautions.   PT Total Time Spent   PT Individual Total Time Spent (Mins) 30   PT Charge Group   PT Therapeutic Exercise 1   PT Therapeutic Activities 1       FIM Bed/Chair/Wheelchair Transfers Score: 6 - Modified Independent  Bed/Chair/Wheelchair Transfers Description:  Increased time (including TLSO setup, reach-pivot to/from w/c, good WB adherence)    FIM Toilet Transfer Score:  6 - Modified Independent  Toilet Transfer Description:  Flash  bar, Increased time (TLSO, w/c level, good WB adherence)    FIM Wheelchair Score:  6 - Modified Independent  Wheelchair Description:   (Mod I indoors for 2x 250 feet, including setup)      Assessment    Patient had good endurance, continued R foot pain but just decreased use during therapy, pt setup all transfers without A or cueing.    Plan    Gait training with axillary crutches, Full body strengthening, balance and endurance activities.  D/C on 4/1/19.

## 2019-03-31 PROCEDURE — 700112 HCHG RX REV CODE 229: Performed by: PHYSICAL MEDICINE & REHABILITATION

## 2019-03-31 PROCEDURE — 700111 HCHG RX REV CODE 636 W/ 250 OVERRIDE (IP): Performed by: PHYSICAL MEDICINE & REHABILITATION

## 2019-03-31 PROCEDURE — 97535 SELF CARE MNGMENT TRAINING: CPT

## 2019-03-31 PROCEDURE — A9270 NON-COVERED ITEM OR SERVICE: HCPCS | Performed by: PHYSICAL MEDICINE & REHABILITATION

## 2019-03-31 PROCEDURE — 97116 GAIT TRAINING THERAPY: CPT

## 2019-03-31 PROCEDURE — 770010 HCHG ROOM/CARE - REHAB SEMI PRIVAT*

## 2019-03-31 PROCEDURE — 97530 THERAPEUTIC ACTIVITIES: CPT

## 2019-03-31 PROCEDURE — 700102 HCHG RX REV CODE 250 W/ 637 OVERRIDE(OP): Performed by: PHYSICAL MEDICINE & REHABILITATION

## 2019-03-31 RX ADMIN — METHOCARBAMOL 750 MG: 750 TABLET ORAL at 13:47

## 2019-03-31 RX ADMIN — OMEPRAZOLE 20 MG: 20 CAPSULE, DELAYED RELEASE ORAL at 09:25

## 2019-03-31 RX ADMIN — ENOXAPARIN SODIUM 30 MG: 100 INJECTION SUBCUTANEOUS at 09:24

## 2019-03-31 RX ADMIN — MAGNESIUM HYDROXIDE 30 ML: 400 SUSPENSION ORAL at 13:54

## 2019-03-31 RX ADMIN — SENNOSIDES AND DOCUSATE SODIUM 1 TABLET: 8.6; 5 TABLET ORAL at 09:26

## 2019-03-31 RX ADMIN — METHOCARBAMOL 750 MG: 750 TABLET ORAL at 09:26

## 2019-03-31 RX ADMIN — TRAMADOL HYDROCHLORIDE 50 MG: 50 TABLET, COATED ORAL at 06:03

## 2019-03-31 RX ADMIN — DOCUSATE SODIUM 100 MG: 100 CAPSULE, LIQUID FILLED ORAL at 20:09

## 2019-03-31 RX ADMIN — PREDNISONE 40 MG: 20 TABLET ORAL at 09:27

## 2019-03-31 RX ADMIN — METHOCARBAMOL 750 MG: 750 TABLET ORAL at 20:09

## 2019-03-31 RX ADMIN — ACETAMINOPHEN 650 MG: 325 TABLET, FILM COATED ORAL at 20:09

## 2019-03-31 RX ADMIN — ENOXAPARIN SODIUM 30 MG: 100 INJECTION SUBCUTANEOUS at 20:09

## 2019-03-31 RX ADMIN — METHOCARBAMOL 750 MG: 750 TABLET ORAL at 17:34

## 2019-03-31 RX ADMIN — SENNOSIDES AND DOCUSATE SODIUM 2 TABLET: 8.6; 5 TABLET ORAL at 20:09

## 2019-03-31 NOTE — THERAPY
"Occupational Therapy  Daily Treatment     Patient Name: Baljinder Mendieta  Age:  58 y.o., Sex:  male  Medical Record #: [id number]  Today's Date: 3/31/2019     Precautions  Precautions: Fall Risk, Toe Touch Weight Bearing Right Lower Extremity, TLSO (Thoracolumbosacral orthosis)  Comments: TLSO when OOB or HOB >45 degrees, R grest toe pain (new onset)      Subjective    \"I feel comfortable with the crutches and doing the stairs\"     Objective       03/31/19 1301   Safety    ADL Safety  Modified Independent   Bathroom Safety Modified Independent   Comments Home safety, work simplification, energy conservation, return to work, fall prevention discussion. Pt demo understanding of safety strategies and d/c home plan has been discussed with family and are prepared to assist. Shower commode is installed in pt's home bathroom, discussed strategies for moving items at FWW/crutches level, and having commonly used items at an easy appropriate height on counters.    Interdisciplinary Plan of Care Collaboration   Patient Position at End of Therapy In Bed;Call Light within Reach;Tray Table within Reach   OT Total Time Spent   OT Individual Total Time Spent (Mins) 30   OT Charge Group   OT Self Care / ADL 2       Assessment    Pt is prepared for d/c tomorrow, DME is set-up at home, and ptemo good understanding of various safety strategies.     Plan    D/c tomorrow    "

## 2019-03-31 NOTE — CARE PLAN
Problem: Safety  Goal: Will remain free from injury  Mod-I in room. Using call light for assist as needed. Bed in low position, call light within reach.    Problem: Pain Management  Goal: Pain level will decrease to patient's comfort goal  PRN pain med administered at HS per request. Good effect. Upon reassess, resting in bed, eyes closed, resp even, unlabored.

## 2019-03-31 NOTE — PROGRESS NOTES
Received patient during shift change, report rec'd from day shift RN. Resting in bed, VS stable on room air. Continent of B&B, Mod-I using w/c. A&O x 4, able to make needs known. Bed in low position, call light within reach.

## 2019-03-31 NOTE — THERAPY
"Occupational Therapy  Daily Treatment     Patient Name: Baljinder Mendieta  Age:  58 y.o., Sex:  male  Medical Record #: [id number]  Today's Date: 3/31/2019     Precautions  Precautions: Fall Risk, TLSO (Thoracolumbosacral orthosis), Toe Touch Weight Bearing Right Lower Extremity  Comments: TLSO on when OOB >5 min and HOB >45 degrees, R great toe pain (new onset)      Subjective    \"my whole leg I could feel pain before, but this pain at just the R toe is new and getting worse\"     Objective       19 0831   Safety    ADL Safety  Modified Independent   Bathroom Safety Modified Independent   Comments pt completed set/up/clean-up Mod I at w/c level using AE. Good adherence to spinal precautions   Interdisciplinary Plan of Care Collaboration   Patient Position at End of Therapy In Bed;Call Light within Reach;Tray Table within Reach  (RN present changing dressings)   OT Total Time Spent   OT Individual Total Time Spent (Mins) 60   OT Charge Group   OT Self Care / ADL 4       FIM Grooming Score:  7 - Independent  Grooming Description:       FIM Bathing Score:  6 - Modified Independent  Bathing Description:       FIM Upper Body Dressin - Independent  Upper Body Dressing Description:       FIM Lower Body Dressing Score:  4 - Minimal Assistance  Lower Body Dressing Description:  Reacher, Sock aid, Increased time (R foot too swollen/pain to use sock aid)    FIM Toileting Body Dressin - Modified Independent  Toileting Description:  Grab bar, Increased time       FIM Toilet Transfer Score:  6 - Modified Independent  Toilet Transfer Description:  Grab bar, Increased time    FIM Tub/Shower Transfers Score:  6 - Modified Independent  Tub/Shower Transfers Description:  Grab bar, Increased time      Assessment    Pt completed shower routine at Mod I level but cont to have greater R toe pain    Plan    D/c tomorrow    "

## 2019-03-31 NOTE — CARE PLAN
Problem: Communication  Goal: The ability to communicate needs accurately and effectively will improve  Patient is able to communicate needs with no difficulty. Patient is educated as to whom to voice concerns and questions to as needed. Patient is able to understand with no difficulty. Will continue to monitor.     Problem: Safety  Goal: Will remain free from falls  Pt is MOD I in his room. Pt educated on fall prevention and he states he understands.    Problem: Infection  Goal: Will remain free from infection  Surgical site on back and right leg exhibits signs of wound healing. Edges are well approximated; no drainage or open areas noted. site is open to air, stapes intact. Edges pink.  Will continue to monitor condition of site and educate pt to s/s of infection/. Pt turns self at least q 2 hr. No areas of redness noted on buttocks. Pt verbalize understanding of need to reposition for skin care, and pressure relief.

## 2019-03-31 NOTE — THERAPY
Physical Therapy   Daily Treatment     Patient Name: Baljinder Mendieta  Age:  58 y.o., Sex:  male  Medical Record #: [id number]  Today's Date: 3/31/2019     Precautions  Precautions: Fall Risk, Toe Touch Weight Bearing Right Lower Extremity, TLSO (Thoracolumbosacral orthosis)  Comments: TLSO when OOB or when HOB >45 degrees, new onset R toe/foot pain    Subjective    I felt better after I walked this morning. I completed some ROM of my R leg with the sheet in bed.     Objective       03/31/19 1401   Precautions   Precautions Fall Risk;Toe Touch Weight Bearing Right Lower Extremity;TLSO (Thoracolumbosacral orthosis)   Comments TLSO when OOB or when HOB >45 degrees, new onset R toe/foot pain   Non Verbal Descriptors   Non Verbal Scale  Calm   Cognition    Orientation Level Oriented x 4   Level of Consciousness Alert   Standing Lower Body Exercises   Other Exercises gait with VERITO around unit, 500'+ with TLSO, B shoes, TTWB R LE, step-to pattern   Bed Mobility    Supine to Sit Independent   Sit to Supine Independent   Sit to Stand Modified Independent   Scooting Independent   Rolling Independent  (bed mob completed on flat bed, no bed rail, TLSO, log roll )   Interdisciplinary Plan of Care Collaboration   Patient Position at End of Therapy In Bed;Call Light within Reach   PT Total Time Spent   PT Individual Total Time Spent (Mins) 30   PT Charge Group   PT Gait Training 1   PT Therapeutic Activities 1       FIM Walking Score:  6 - Modified Independent  Walking Description:  Verbal cueing, Supervision for safety (TTWB R LE with bilateral axillary crutches, 500 ft x 2, step-to pattern, TLSO donned, no increase in pain)      Assessment    Improved gait pattern with crutches and B shoes. Patient compliant with TTWB R LE. Independent bed mobility from flat bed no bed rail. Education completed with proper demonstration of log roll technique.    Plan    DC tomorrow pending R LE sensitivity and pain.

## 2019-03-31 NOTE — THERAPY
Physical Therapy   Daily Treatment     Patient Name: Baljinder Mendieta  Age:  58 y.o., Sex:  male  Medical Record #: [id number]  Today's Date: 3/31/2019     Precautions  Precautions: (P) Fall Risk, Toe Touch Weight Bearing Right Lower Extremity, TLSO (Thoracolumbosacral orthosis)  Comments: (P) TLSO when OOB or HOB >45 degrees, R grest toe pain (new onset)    Subjective  I'm not sure if I am going home tomorrow due to this new R leg pain and swelling.     Objective  Education on DME needs for safe DC home potentially tomorrow. Patient educated on proper sizing and use of axillary crutches for TTWB outdoors, on stairs and with transfers. Demonstration required for gait with VERITO TTWB R LE- improved pain with TTWB vs NWB R LE. Paitent ind with donning TLSO, declined donning R boot due to R toe pain.       03/31/19 1031   Precautions   Precautions Fall Risk;Toe Touch Weight Bearing Right Lower Extremity;TLSO (Thoracolumbosacral orthosis)   Comments TLSO when OOB or HOB >45 degrees, R grest toe pain (new onset)   Pain   Intervention Medication (see MAR)   Pain 0 - 10 Group   Location Toe   Location Orientation Right   Pain Rating Scale (NPRS) 2   Description Aching   Comfort Goal Comfort with Movement   Therapist Pain Assessment Prior to Activity   Non Verbal Descriptors   Non Verbal Scale  Calm   Supine Lower Body Exercise   Other Exercises R ankle DF stretch 2x1 min, no increase in pain   Sitting Lower Body Exercises   Ankle Pumps 1 set of 15   Long Arc Quad 1 set of 15   Marching 1 set of 15   Interdisciplinary Plan of Care Collaboration   Patient Position at End of Therapy Seated   Collaboration Comments patient going to lunch room   PT Total Time Spent   PT Individual Total Time Spent (Mins) 60   PT Charge Group   PT Gait Training 2   PT Therapeutic Activities 2     FIM Bed/Chair/Wheelchair Transfers Score: 6 - Modified Independent  Bed/Chair/Wheelchair Transfers Description:   (TLSO donned EOB, squat pivot Meron to  WC)    FIM Walking Score:  5 - Standby Prompting/Supervision or Set-up  Walking Description:  Assist device/equipment, Supervision for safety, Verbal cueing, Extra time (160 ft x 2 outdoors with VERITO, SBA, VC for TTWB)    FIM Wheelchair Score:  6 - Modified Independent  Wheelchair Description:   (350 outdoors, up/down ramp, Wild, 300 ft indoors, Wild)    FIM Stairs Score:  2 - Max Assistance  Stairs Description:  Hand rails, Assist device/equipment, Verbal cueing, Supervision for safety (5 steps x 2 with right handrail outdoors with crutches in L UE, CGA and VC)      Assessment  Improved  gait pattern and tolerance with TTWB R LE with crutches. No increase in pain in R LE with treatment. Patient very safe with functional mobility.      Plan  Car transfer, bed mobility without use of bed rails. Continue review for safe DC home 4/1/19 or later this week pending R LE pain.

## 2019-04-01 VITALS
HEIGHT: 70 IN | RESPIRATION RATE: 18 BRPM | TEMPERATURE: 98 F | BODY MASS INDEX: 24.62 KG/M2 | WEIGHT: 172 LBS | DIASTOLIC BLOOD PRESSURE: 87 MMHG | OXYGEN SATURATION: 97 % | HEART RATE: 71 BPM | SYSTOLIC BLOOD PRESSURE: 136 MMHG

## 2019-04-01 PROCEDURE — 700102 HCHG RX REV CODE 250 W/ 637 OVERRIDE(OP): Performed by: PHYSICAL MEDICINE & REHABILITATION

## 2019-04-01 PROCEDURE — A9270 NON-COVERED ITEM OR SERVICE: HCPCS | Performed by: PHYSICAL MEDICINE & REHABILITATION

## 2019-04-01 PROCEDURE — 700111 HCHG RX REV CODE 636 W/ 250 OVERRIDE (IP): Performed by: PHYSICAL MEDICINE & REHABILITATION

## 2019-04-01 PROCEDURE — 700112 HCHG RX REV CODE 229: Performed by: PHYSICAL MEDICINE & REHABILITATION

## 2019-04-01 PROCEDURE — 99239 HOSP IP/OBS DSCHRG MGMT >30: CPT | Performed by: PHYSICAL MEDICINE & REHABILITATION

## 2019-04-01 RX ORDER — METHOCARBAMOL 750 MG/1
750 TABLET, FILM COATED ORAL 3 TIMES DAILY PRN
Qty: 90 TAB | Refills: 0 | Status: SHIPPED | OUTPATIENT
Start: 2019-04-01

## 2019-04-01 RX ORDER — TRAMADOL HYDROCHLORIDE 50 MG/1
50 TABLET ORAL EVERY 4 HOURS PRN
Qty: 30 TAB | Refills: 0 | Status: SHIPPED | OUTPATIENT
Start: 2019-04-01 | End: 2019-04-15

## 2019-04-01 RX ORDER — ALLOPURINOL 100 MG/1
100 TABLET ORAL DAILY
Qty: 30 TAB | Refills: 1 | Status: SHIPPED | OUTPATIENT
Start: 2019-04-01

## 2019-04-01 RX ORDER — PREDNISONE 20 MG/1
20 TABLET ORAL DAILY
Qty: 4 TAB | Refills: 0 | Status: SHIPPED | OUTPATIENT
Start: 2019-04-01 | End: 2019-04-05

## 2019-04-01 RX ADMIN — DOCUSATE SODIUM 100 MG: 100 CAPSULE, LIQUID FILLED ORAL at 08:09

## 2019-04-01 RX ADMIN — OMEPRAZOLE 20 MG: 20 CAPSULE, DELAYED RELEASE ORAL at 08:09

## 2019-04-01 RX ADMIN — ENOXAPARIN SODIUM 30 MG: 100 INJECTION SUBCUTANEOUS at 08:10

## 2019-04-01 RX ADMIN — PREDNISONE 40 MG: 20 TABLET ORAL at 08:09

## 2019-04-01 RX ADMIN — METHOCARBAMOL 750 MG: 750 TABLET ORAL at 08:09

## 2019-04-01 RX ADMIN — BISACODYL 10 MG: 10 SUPPOSITORY RECTAL at 10:38

## 2019-04-01 RX ADMIN — ACETAMINOPHEN 650 MG: 325 TABLET, FILM COATED ORAL at 14:58

## 2019-04-01 RX ADMIN — METHOCARBAMOL 750 MG: 750 TABLET ORAL at 13:35

## 2019-04-01 RX ADMIN — SENNOSIDES AND DOCUSATE SODIUM 2 TABLET: 8.6; 5 TABLET ORAL at 08:09

## 2019-04-01 ASSESSMENT — ACTIVITIES OF DAILY LIVING (ADL)
TOILETING_LEVEL_OF_ASSIST: ABLE TO COMPLETE TOILETING WITHOUT ASSIST
SHOWER_TRANSFER_LEVEL_OF_ASSIST: REQUIRES SUPERVISION WITH SHOWER TRANSFER
TOILET_TRANSFER_LEVEL_OF_ASSIST: ABLE TO COMPLETE TOILET TRANSFER WITHOUT ASSIST

## 2019-04-01 NOTE — PROGRESS NOTES
Staples to hip dc'd and steri strips placed. Tolerated well. No redness, swelling or drainage noted. Also had a very large BM after Dulc supp given this am. Dsicharged to home accompanied by his brother, with all personal belongings, prescription X1 and discharge instructions. He verbalized understanding of all instructions.
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
## 2019-04-01 NOTE — CARE PLAN
Problem: Pain Management  Goal: Pain level will decrease to patient's comfort goal  Tylenol given PRN per MAR for c/o right foot and right hip pain 2/10 with adequate relief. Pt sleeps good. Will continue to monitor.

## 2019-04-01 NOTE — CARE PLAN
Problem: Recreation Therapy  Goal: STG-Within one week, patient will demonstrate a standing tolerance  By remaining standing for 50% of the session while performing a given activity.   Outcome: MET Date Met: 04/01/19  Was able to remain standing for 50% of 1/2 sessions.   Goal: STG-Within one week, patient will  Share on any activity he would like to participate in following discharge and if he would like to participate in adaptive recreation while in recovery.    Outcome: MET Date Met: 04/01/19  Shared he would like to get back to recreation however does not want this to be adaptive. He shared wanting to wait until he was fully recovered and engage in sports without adaptation like skiing.

## 2019-04-01 NOTE — DISCHARGE PLANNING
Case Management Discharge Instructions        Discharge Location: Home with Outpatient Services     Agency Name / Address / Phone: [facility]        [address] [place] [address]         [phone]     Outpatient Therapy: Occupational Therapist, Physical Therapist     Follow-up Information:    Ayaz Nunn M.D. Primary Care Provider  [address] [place] [id number]  [phone]   On 4.5.2019 Friday, check in at 12 noon for 12:15pm appointment. we will fax records to office     Norman Hill M.D. Orthopaedic Surgeon  [address] [facility] [address]  [facility] [id number] [phone]  On 4.15.2019 Monday, check in at 8:45am for 9:15am new patient appointment. please bring photo ID & insurance card to initial appointment. we will fax records to office     Maulik Muñoz M.D. Neuro-surgeon  [facility] [address] [address]  [phone]  Office will call to set up appointment

## 2019-04-01 NOTE — PROGRESS NOTES
PATIENT DISCHARGE MEDICATION COUNSELING:  This patient received individualized medication counseling regarding the current regimen they are receiving in this facility. Potential adverse effects, monitoring parameters, and proper administration were covered in preparation for their discharge. The patient asked relevant questions regarding the medications for which answers were provided. Our pharmacy hours and phone number were provided for follow up questions should they arise.  Pain medications: The patient will likely be taking pain medications upon discharge. The patient was warned regarding sedation, impairment, and constipation as side effects to such regimens. Further warning regarding operating motor vehicles, or dangerous equipment was delivered as well.    Daniel WashingtonD

## 2019-04-01 NOTE — CARE PLAN
Problem: Safety  Goal: Will remain free from injury  Tranfers mod I in the room using wheelchair. Encouraged to use call light when in need of assistance. Will continue to monitor.    Problem: Bowel/Gastric:  Goal: Normal bowel function is maintained or improved  Pt with c/o constipation. Bowel sounds present x 4 quadrants. He's on scheduled senokot and colace daily. He was given MOM during day shift and reports no result as of this far. Provided pt with prune juice x 2 before bedtime and he was agreeable to take another MOM in am if still no result. LBM 3/30/19. Will continue to monitor.

## 2019-04-01 NOTE — CARE PLAN
Problem: OT Long Term Goals  Goal: LTG-By discharge, patient will complete basic self care tasks  1) Individualized Goal:  Mod I using AE/AD/techniques as needed.  2) Interventions:  OT Group Therapy, OT Self Care/ADL, OT Community Reintegration, OT Neuro Re-Ed/Balance, OT Therapeutic Activity, OT Evaluation and OT Therapeutic Exercise     Outcome: NOT MET  Requires assist to don R sock due to foot pain    Goal: LTG-By discharge, patient will perform bathroom transfers  1) Individualized Goal:  Mod I using AE/AD/techniques as needed.  2) Interventions:  OT Group Therapy, OT Self Care/ADL, OT Community Reintegration, OT Neuro Re-Ed/Balance, OT Therapeutic Activity, OT Evaluation and OT Therapeutic Exercise     Outcome: MET Date Met: 04/01/19

## 2019-04-01 NOTE — DISCHARGE PLANNING
Outpatient therapy referral sent to [facility] in [place] per choice form.  Awaiting response.

## 2019-04-01 NOTE — DISCHARGE INSTRUCTIONS
Tramadol tablets  What is this medicine?  TRAMADOL (TRA ma dole) is a pain reliever. It is used to treat moderate to severe pain in adults.  This medicine may be used for other purposes; ask your health care provider or pharmacist if you have questions.  COMMON BRAND NAME(S): Ultram  What should I tell my health care provider before I take this medicine?  They need to know if you have any of these conditions:  -brain tumor  -depression  -drug abuse or addiction  -head injury  -if you frequently drink alcohol containing drinks  -kidney disease or trouble passing urine  -liver disease  -lung disease, asthma, or breathing problems  -seizures or epilepsy  -suicidal thoughts, plans, or attempt; a previous suicide attempt by you or a family member  -an unusual or allergic reaction to tramadol, codeine, other medicines, foods, dyes, or preservatives  -pregnant or trying to get pregnant  -breast-feeding  How should I use this medicine?  Take this medicine by mouth with a full glass of water. Follow the directions on the prescription label. You can take it with or without food. If it upsets your stomach, take it with food. Do not take your medicine more often than directed.  A special MedGuide will be given to you by the pharmacist with each prescription and refill. Be sure to read this information carefully each time.  Talk to your pediatrician regarding the use of this medicine in children. Special care may be needed.  Overdosage: If you think you have taken too much of this medicine contact a poison control center or emergency room at once.  NOTE: This medicine is only for you. Do not share this medicine with others.  What if I miss a dose?  If you miss a dose, take it as soon as you can. If it is almost time for your next dose, take only that dose. Do not take double or extra doses.  What may interact with this medicine?  Do not take this medication with any of the following medicines:  -MAOIs like Carbex, Eldepryl,  Marplan, Nardil, and Parnate  This medicine may also interact with the following medications:  -alcohol  -antihistamines for allergy, cough and cold  -certain medicines for anxiety or sleep  -certain medicines for depression like amitriptyline, fluoxetine, sertraline  -certain medicines for migraine headache like almotriptan, eletriptan, frovatriptan, naratriptan, rizatriptan, sumatriptan, zolmitriptan  -certain medicines for seizures like carbamazepine, oxcarbazepine, phenobarbital, primidone  -certain medicines that treat or prevent blood clots like warfarin  -digoxin  -furazolidone  -general anesthetics like halothane, isoflurane, methoxyflurane, propofol  -linezolid  -local anesthetics like lidocaine, pramoxine, tetracaine  -medicines that relax muscles for surgery  -other narcotic medicines for pain or cough  -phenothiazines like chlorpromazine, mesoridazine, prochlorperazine, thioridazine  -procarbazine  This list may not describe all possible interactions. Give your health care provider a list of all the medicines, herbs, non-prescription drugs, or dietary supplements you use. Also tell them if you smoke, drink alcohol, or use illegal drugs. Some items may interact with your medicine.  What should I watch for while using this medicine?  Tell your doctor or health care professional if your pain does not go away, if it gets worse, or if you have new or a different type of pain. You may develop tolerance to the medicine. Tolerance means that you will need a higher dose of the medicine for pain relief. Tolerance is normal and is expected if you take this medicine for a long time.  Do not suddenly stop taking your medicine because you may develop a severe reaction. Your body becomes used to the medicine. This does NOT mean you are addicted. Addiction is a behavior related to getting and using a drug for a non-medical reason. If you have pain, you have a medical reason to take pain medicine. Your doctor will tell  you how much medicine to take. If your doctor wants you to stop the medicine, the dose will be slowly lowered over time to avoid any side effects.  There are different types of narcotic medicines (opiates). If you take more than one type at the same time or if you are taking another medicine that also causes drowsiness, you may have more side effects. Give your health care provider a list of all medicines you use. Your doctor will tell you how much medicine to take. Do not take more medicine than directed. Call emergency for help if you have problems breathing or unusual sleepiness.  You may get drowsy or dizzy. Do not drive, use machinery, or do anything that needs mental alertness until you know how this medicine affects you. Do not stand or sit up quickly, especially if you are an older patient. This reduces the risk of dizzy or fainting spells. Alcohol can increase or decrease the effects of this medicine. Avoid alcoholic drinks.  You may have constipation. Try to have a bowel movement at least every 2 to 3 days. If you do not have a bowel movement for 3 days, call your doctor or health care professional.  Your mouth may get dry. Chewing sugarless gum or sucking hard candy, and drinking plenty of water may help. Contact your doctor if the problem does not go away or is severe.  What side effects may I notice from receiving this medicine?  Side effects that you should report to your doctor or health care professional as soon as possible:  -allergic reactions like skin rash, itching or hives, swelling of the face, lips, or tongue  -breathing problems  -confusion  -seizures  -signs and symptoms of low blood pressure like dizziness; feeling faint or lightheaded, falls; unusually weak or tired  -trouble passing urine or change in the amount of urine  Side effects that usually do not require medical attention (report to your doctor or health care professional if they continue or are bothersome):  -constipation  -dry  mouth  -nausea, vomiting  -tiredness  This list may not describe all possible side effects. Call your doctor for medical advice about side effects. You may report side effects to FDA at 6-342-FDA-7275.  Where should I keep my medicine?  Keep out of the reach of children.  This medicine may cause accidental overdose and death if it taken by other adults, children, or pets. Mix any unused medicine with a substance like cat litter or coffee grounds. Then throw the medicine away in a sealed container like a sealed bag or a coffee can with a lid. Do not use the medicine after the expiration date.  Store at room temperature between 15 and 30 degrees C (59 and 86 degrees F).  NOTE: This sheet is a summary. It may not cover all possible information. If you have questions about this medicine, talk to your doctor, pharmacist, or health care provider.  © 2018 Elsevier/Gold Standard (2016-09-11 09:00:04)  Allopurinol tablets  What is this medicine?  ALLOPURINOL (al oh PURE i nole) reduces the amount of uric acid the body makes. It is used to treat the symptoms of gout. It is also used to treat or prevent high uric acid levels that occur as a result of certain types of chemotherapy. This medicine may also help patients who frequently have kidney stones.  This medicine may be used for other purposes; ask your health care provider or pharmacist if you have questions.  COMMON BRAND NAME(S): Zyloprim  What should I tell my health care provider before I take this medicine?  They need to know if you have any of these conditions:  -kidney or liver disease  -an unusual or allergic reaction to allopurinol, other medicines, foods, dyes, or preservatives  -pregnant or trying to get pregnant  -breast feeding  How should I use this medicine?  Take this medicine by mouth with a glass of water. Follow the directions on the prescription label. If this medicine upsets your stomach, take it with food or milk. Take your doses at regular intervals.  Do not take your medicine more often than directed.  Talk to your pediatrician regarding the use of this medicine in children. Special care may be needed. While this drug may be prescribed for children as young as 6 years for selected conditions, precautions do apply.  Overdosage: If you think you have taken too much of this medicine contact a poison control center or emergency room at once.  NOTE: This medicine is only for you. Do not share this medicine with others.  What if I miss a dose?  If you miss a dose, take it as soon as you can. If it is almost time for your next dose, take only that dose. Do not take double or extra doses.  What may interact with this medicine?  Do not take this medicine with the following medication:  -didanosine, ddI  This medicine may also interact with the following medications:  -amoxicillin or ampicillin  -azathioprine  -certain medicines used to treat gout  -certain types of diuretics  -chlorpropamide  -cyclosporine  -dicumarol  -mercaptopurine  -tolbutamide  -warfarin  This list may not describe all possible interactions. Give your health care provider a list of all the medicines, herbs, non-prescription drugs, or dietary supplements you use. Also tell them if you smoke, drink alcohol, or use illegal drugs. Some items may interact with your medicine.  What should I watch for while using this medicine?  Visit your doctor or health care professional for regular checks on your progress. If you are taking this medicine to treat gout, you may not have less frequent attacks at first. Keep taking your medicine regularly and the attacks should get better within 2 to 6 weeks. Drink plenty of water (10 to 12 full glasses a day) while you are taking this medicine. This will help to reduce stomach upset and reduce the risk of getting gout or kidney stones.  Call your doctor or health care professional at once if you get a skin rash together with chills, fever, sore throat, or nausea and  vomiting, if you have blood in your urine, or difficulty passing urine.  Do not take vitamin C without asking your doctor or health care professional. Too much vitamin C can increase the chance of getting kidney stones.  You may get drowsy or dizzy. Do not drive, use machinery, or do anything that needs mental alertness until you know how this drug affects you. Do not stand or sit up quickly, especially if you are an older patient. This reduces the risk of dizzy or fainting spells. Alcohol can make you more drowsy and dizzy. Alcohol can also increase the chance of stomach problems and increase the amount of uric acid in your blood. Avoid alcoholic drinks.  What side effects may I notice from receiving this medicine?  Side effects that you should report to your doctor or health care professional as soon as possible:  -allergic reactions like skin rash, itching or hives, swelling of the face, lips, or tongue  -breathing problems  -muscle aches or pains  -redness, blistering, peeling or loosening of the skin, including inside the mouth  Side effects that usually do not require medical attention (report to your doctor or health care professional if they continue or are bothersome):  -changes in taste  -diarrhea  -indigestion  -stomach pain or cramps  This list may not describe all possible side effects. Call your doctor for medical advice about side effects. You may report side effects to FDA at 7-230-FDA-4314.  Where should I keep my medicine?  Keep out of the reach of children.  Store at room temperature between 15 and 25 degrees C (59 and 77 degrees F). Protect from light and moisture. Throw away any unused medicine after the expiration date.  NOTE: This sheet is a summary. It may not cover all possible information. If you have questions about this medicine, talk to your doctor, pharmacist, or health care provider.  © 2018 Elsevier/Gold Standard (2009-06-22 14:26:54)  Constipation, Adult  Constipation is when a  person:  · Poops (has a bowel movement) fewer times in a week than normal.  · Has a hard time pooping.  · Has poop that is dry, hard, or bigger than normal.  Follow these instructions at home:  Eating and drinking  · Eat foods that have a lot of fiber, such as:  ¨ Fresh fruits and vegetables.  ¨ Whole grains.  ¨ Beans.  · Eat less of foods that are high in fat, low in fiber, or overly processed, such as:  ¨ French fries.  ¨ Hamburgers.  ¨ Cookies.  ¨ Candy.  ¨ Soda.  · Drink enough fluid to keep your pee (urine) clear or pale yellow.  General instructions  · Exercise regularly or as told by your doctor.  · Go to the restroom when you feel like you need to poop. Do not hold it in.  · Take over-the-counter and prescription medicines only as told by your doctor. These include any fiber supplements.  · Do pelvic floor retraining exercises, such as:  ¨ Doing deep breathing while relaxing your lower belly (abdomen).  ¨ Relaxing your pelvic floor while pooping.  · Watch your condition for any changes.  · Keep all follow-up visits as told by your doctor. This is important.  Contact a doctor if:  · You have pain that gets worse.  · You have a fever.  · You have not pooped for 4 days.  · You throw up (vomit).  · You are not hungry.  · You lose weight.  · You are bleeding from the anus.  · You have thin, pencil-like poop (stool).  Get help right away if:  · You have a fever, and your symptoms suddenly get worse.  · You leak poop or have blood in your poop.  · Your belly feels hard or bigger than normal (is bloated).  · You have very bad belly pain.  · You feel dizzy or you faint.  This information is not intended to replace advice given to you by your health care provider. Make sure you discuss any questions you have with your health care provider.  Document Released: 06/05/2009 Document Revised: 07/07/2017 Document Reviewed: 06/07/2017  [employer] Interactive Patient Education © 2017 [employer].  Physical Therapy Discharge  Instructions for Baljinder Mendieta    4/1/2019    Weight Bearing Status - Patient Should: Bear Toe-Touch Weight Right Leg  Level of Assist Required for Ambulation: Supervision on Flat Surfaces, Assist for Balance on Curbs, Assist for Balance on Stairs  Distance Patient May Ambulate: as tolerated  Device Recommended for Ambulation: Crutches  Level of Assist Required for Transfers: Requires No Assist  Device Recommended for Transfers: Crutches    Occupational Therapy Discharge Instructions for Baljinder Mendieta    4/1/2019    Level of Assist Required for Eating: Able to Complete Eating without Assist  Level of Assist Required for Grooming: Able to Complete Grooming without Assist  Level of Assist Required for Dressing: Requires Physical Assist with Dressing (to put on right sock due to pain)  Equipment for Dressing: Reacher  Level of Assist Required for Toileting: Able to Complete Toileting without Assist  Level of Assist Required for Toilet Transfer: Able to Complete Toilet Transfer without Assist  Level of Assist Required for Bathing: Able to Complete Bathing without Assist  Equipment for Bathing: Shower Chair, Other (walker)  Level of Assist Required for Shower Transfer: Requires Supervision with Shower Transfer  Equipment for Shower Transfer: Shower Chair, Other (walker)  Level of Assist Required for Home Mgmt: Requires Physical Assist with Home Management  Level of Assist Required for Meal Prep: Requires Supervision with Meal Preparation  Driving: Please Contact Physician Prior to Driving  Home Exercise Program: None Issued  Comments: Good luck back at home, Bradly.  It was good working with you.  Jamari Landeros OT        [facility] NURSING DISCHARGE INSTRUCTIONS    Blood Pressure: 133/80  Weight: 78 kg (172 lb)  Nursing recommendations for Baljinder Mendieta at time of discharge are as follows:  Client and Family Member verbalized understanding of all discharge instructions and prescriptions.      Review all your home medications and newly ordered medications with your doctor and/or pharmacist. Follow medication instructions as directed by your doctor and/or pharmacist.    Pain Management:   Discharge Pain Medication Instructions:  Comfort Goal: 0, Comfort at Rest, Sleep Comfortably  Notify your primary care provider if pain is unrelieved with these measures, if the pain is new, or increased in intensity.    Discharge Skin Characteristics: Warm, Dry  Discharge Skin Exam: Other (Comments) (right hip, leg & back surgical incisions)     Skin / Wound Care Instructions: Please contact your primary care physician for any change in skin integrity. 0    If You Have Surgical Incisions / Wounds:  Monitor surgical site(s) for signs of increased swelling, redness or symptoms of drainage from the site or fever as this could indicate signs and symptoms of infection. If these symptoms are noted, notifiy your primary care provider.      Discharge Safety Instructions: No Supervision Needed     Discharge Safety Concerns: No Concerns Noted  The interdisciplinary team has made recommendation that you do not require supervision in the house due to demonstration of safety with ADL's and IADLS and problem solving skills  Anti-embolic stockings are required during the day and off at night to increase circulation to the lower extremities.    Discharge Diet: Regular diet     Discharge Liquids: Thin liquids  Discharge Bowel Function: Continent  Please contact your primary care physician for any changes in bowel habits.  Discharge Bowel Program:    Discharge Bladder Function: Continent  Discharge Urinary Devices:        Nursing Discharge Plan:   Influenza Vaccine Indication: Not indicated: Previously immunized this influenza season and > 8 years of age    Case Management Discharge Instructions:   Discharge Location:    Agency Name/Address/Phone:    Home Health:    Outpatient Services:    DME Provider/Phone:    Medical Equipment  Ordered:    Prescription Faxed to:        Discharge Medication Instructions:  Below are the medications your physician expects you to take upon discharge:

## 2019-04-01 NOTE — DISCHARGE PLANNING
Case management Summary:   Met with patient prior to discharge.   Reviewed all follow up appointments: PCP & new patient appt ortho in [place]. NSGY office will call to set up f/u appointment.   Referral made to [facility] OP Therapy and they are have accepted referral and are ready to follow.    [place] DMV handicap placard form provided to patient. Instructed to take w/ him to PCP f/u appt Friday for completion.   During hospitalization, I have provided support and education and have been available for questions and information during hours of operation, communicated with therapy team and MD along with providing links/resources  to outside services.    Patient verbalizes agreement with all plans and has an understanding of the next steps within the post acute services.     Individualized Goals:   1. To get home ASAP  2. To keep my business going  3. To drive    Outcome:   1. Goal met & completed: discharging home today for OP mgmt: PT/OT referral.  2. 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  3. Goal not met: patient has not been cleared to drive, will be f/u by OP therapies & PCP for clearance.

## 2019-04-02 NOTE — DISCHARGE PLANNING
Jose Cunningham at [facility], referral accepted.
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
## 2019-09-17 ENCOUNTER — HOSPITAL ENCOUNTER (OUTPATIENT)
Dept: RADIOLOGY | Facility: MEDICAL CENTER | Age: 59
End: 2019-09-17

## 2020-03-18 NOTE — DISCHARGE PLANNING
Anticipated Discharge Disposition:   [facility] has accepted    Action:   Received a call from Bennie stating that pt's out of pocket cost is about $[phone] . He already talked to pt's wife.   Assessment completed.     Barriers to Discharge:   Insurance auth    Plan:   Follow up with [facility] at [facility].      Attempted to call pt. No answer, LVM to give the office a call back

## 2020-09-27 NOTE — CARE PLAN
Problem: Bowel/Gastric:  Goal: Normal bowel function is maintained or improved  Outcome: PROGRESSING AS EXPECTED  Patient refused bowel med this am. Denied any s/s of constipation. Last BM was on 3/29.     Problem: Pain Management  Goal: Pain level will decrease to patient's comfort goal  Outcome: PROGRESSING AS EXPECTED  Patient c/o mild pain 3/10. Medicated him with tramadol for pain with positive for pain.        Symptoms: [place] - Pink eye in left eye    Onset:   This morning woke with crusting    Location/description/associated Symptoms:  Woke up with redness and itching to left eye  Crusting this am with No drainage  Tiny bump to eyelid  Painful  No fever or chills  No visual disturbance    What improves/worsens symptoms:  Just started this am    Symptom specific medications:   None used    Intake and Output: not pertinent    Activity level: acting appropriate for age    Temperature (route and time): none    Weight:   Wt Readings from Last 1 Encounters:   09/19/18 57.8 kg (83 %, Z= 0.95)*     * Growth percentiles are based on CDC (Girls, 2-20 Years) data.          Recent Care: none    Did the patient have a positive coronavirus screening?: No    PLAN:  See/Call Provider within 24 hours    Caller agrees to follow recommendations.    Reason for Disposition  • Eyelid is red or moderately swollen (Exception: mild swelling or pinkness)    Protocols used: EYE - RED WITHOUT PUS-P-AH

## 2021-05-24 NOTE — PROGRESS NOTES
Called and spoke with patient to let her know that her appointment for today with Dr. Alexander Schaefer had to be rescheduled due to the provider is on call. Patient is aware of when I have her appointment rescheduled too. POD#3  S/P ORIF right acetabulum  TTWB x 10 weeks  PT/OT when able  Pain control  SCDs  Equinus boot for foot drop

## 2022-02-17 NOTE — THERAPY
"Occupational Therapy  Daily Treatment     Patient Name: Baljinder Mendieta  Age:  58 y.o., Sex:  male  Medical Record #: [id number]  Today's Date: 3/29/2019     Precautions  Precautions: (P) Fall Risk, TLSO (Thoracolumbosacral orthosis), Toe Touch Weight Bearing Right Lower Extremity  Comments: (P) TLSO on when OOB >5 min and HOB >45 degrees, R great toe pain (new onset)         Subjective    \"I just don't want this gout to spread to my other foot.\"     Objective       03/29/19 1331   Precautions   Precautions Fall Risk;TLSO (Thoracolumbosacral orthosis);Toe Touch Weight Bearing Right Lower Extremity   Comments TLSO on when OOB >5 min and HOB >45 degrees, R great toe pain (new onset)   Sitting Upper Body Exercises   Chest Press 3 sets of 10;Bilateral;Weight (See Comments for lbs)  (25, 30, 30 lbs)   Lat Pull 3 sets of 10;Bilateral;Weight (See Comments for lbs)  (35, 40, 40)   Bilateral Row 3 sets of 10;Bilateral;Weight (See Comments for lbs)  (25, 30, 35 lbs)   Bed Mobility    Supine to Sit Modified Independent   Scooting Modified Independent   Interdisciplinary Plan of Care Collaboration   IDT Collaboration with     Collaboration Comments Paty discussed follow up appt with surgeon with pt   OT Total Time Spent   OT Individual Total Time Spent (Mins) 30   OT Charge Group   OT Therapeutic Exercise  2       Assessment    Tolerated UB exercises without complaints.  Still having right great toe pain, unchanged from this morning.    Plan    D/C [facility] Saturday or Sunday for d/c home on Monday please.    " Medication Management Service  [facility]  [phone]    Visit Date: 2/17/2022   Subjective:       Mervat Bautista is a 29 y.o. male who presents to clinic today for anticoagulation monitoring and adjustment. Patient seen in clinic for warfarin management due to  Indication:   CVA, MI and TIA. INR goal: of 2.0-2.5  Duration of therapy: indefinite. Patient reports the following:   Adherent with regimen  Missed or extra doses:  None   Bleeding or thromboembolic side effects:  None  Significant medication changes:  None  Significant dietary changes: None  Significant alcohol or tobacco changes: None  Significant recent illness, disease state changes, or hospitalization:  None  Upcoming surgeries or procedures:  None  Falls: None           Assessment and Plan     PT/INR done in office per protocol. INR today is 1.9, therapeutic, but just below goal range. Patient inquires if warfarin can cause heart palpitations. Advised patient it does not and to discuss with his cardiologist if he is experiencing palpitations. Plan: Will continue current regimen of warfarin 6mg daily except 8mg on Tuesdays and Fridays. Recheck INR in 1.5 week(s). Patient verbalized understanding of dosing directions and information discussed. Dosing schedule given to patient including phone number, appointment date, and time. Progress note sent to referring office. Patient acknowledges working in consult agreement with pharmacist as referred by his/her physician.       Electronically signed by Shelley James [address] [place] [address] on 2/17/22 at 4:25 PM [address] [place] [address] Intervention Detail:    Total # of Interventions Recommended:    Total # of Interventions Accepted:    Time Spent (min): 15

## 2023-05-30 NOTE — ED NOTES
Pt medicated for pain per mar order. Allergies verified.     68 yr old male with a pmh of HTN, T2DM on insulin, HLD, HCV who presents with a 1 year history of lower extremity edema that has progressed significantly over the past 6 months and is now associated with KIM (cannot climb 1 flight of stairs without becoming winded). He reports due to his legs feeling heavy he has experienced several falls while climbing the stairs over the past 2 weeks.   Denies  headache, dizziness, chest pain, palpitations, abdominal pain, joint pain, diarrhea, urinary symptoms.   Vitals: T 97.9, , /87, RR 18 satting 100% RA

## 2024-05-28 NOTE — DISCHARGE PLANNING
Dr. Ny has accepted Baljinder. Transport has been arranged for 1300.  PEARL [place] and Felicia, CM [id number] are aware.    Scheduled nursevisit.

## (undated) DEVICE — BLADE SURGICAL CLIPPER - (50EA/CA)

## (undated) DEVICE — SPONGE GAUZE STER 4X4 8-PL - (2/PK 50PK/BX 12BX/CS)

## (undated) DEVICE — SUCTION INSTRUMENT YANKAUER BULBOUS TIP W/O VENT (50EA/CA)

## (undated) DEVICE — DRAPE LAPAROTOMY T SHEET - (12EA/CA)

## (undated) DEVICE — SUTURE 3-0 VICRYL PLUS RB-1 - 8 X 18 INCH (12/BX)

## (undated) DEVICE — ADAPTER CATHETER LUER LOCK SYRINGE STERILE

## (undated) DEVICE — GLOVE BIOGEL SZ 8 SURGICAL PF LTX - (50PR/BX 4BX/CA)

## (undated) DEVICE — GOWN WARMING STANDARD FLEX - (30/CA)

## (undated) DEVICE — BANDAGE ROLL STERILE BULKEE 4.5 IN X 4 YD (100EA/CA)

## (undated) DEVICE — DRAPE U ORTHOPEDIC - (10/BX)

## (undated) DEVICE — CHLORAPREP 26 ML APPLICATOR - ORANGE TINT(25/CA)

## (undated) DEVICE — ELECTRODE 850 FOAM ADHESIVE - HYDROGEL RADIOTRNSPRNT (50/PK)

## (undated) DEVICE — SODIUM CHL IRRIGATION 0.9% 1000ML (12EA/CA)

## (undated) DEVICE — Device

## (undated) DEVICE — ELECTRODE DUAL RETURN W/ CORD - (50/PK)

## (undated) DEVICE — SUTURE 1 VICRYL PLUS CTX - 8 X 18 INCH (12/BX)

## (undated) DEVICE — GLOVE BIOGEL SZ 7.5 SURGICAL PF LTX - (50PR/BX 4BX/CA)

## (undated) DEVICE — DRAPE SURGICAL U 77X120 - (10/CA)

## (undated) DEVICE — GLOVE BIOGEL PI INDICATOR SZ 7.0 SURGICAL PF LF - (50/BX 4BX/CA)

## (undated) DEVICE — DRAPE C-ARM LARGE 41IN X 74 IN - (10/BX 2BX/CA)

## (undated) DEVICE — CLOSURE SKIN STRIP 1/2 X 4 IN - (STERI STRIP) (50/BX 4BX/CA)

## (undated) DEVICE — STERI STRIP COMPOUND BENZOIN - TINCTURE 0.6ML WITH APPLICATOR (40EA/BX)

## (undated) DEVICE — PACK JACKSON TABLE KIT W/OUT - HR (6EA/CA)

## (undated) DEVICE — BONE WAX (12PK/BX)

## (undated) DEVICE — HEMOSTAT SURG ABSORBABLE - 2 X 3 IN SURGICEL (24EA/CA)

## (undated) DEVICE — TOOL DISSECT MATCH HEAD

## (undated) DEVICE — SURGIFOAM (12X7) - (12EA/CA)

## (undated) DEVICE — SPONGE GAUZESTER 4 X 4 4PLY - (128PK/CA)

## (undated) DEVICE — LACTATED RINGERS INJ 1000 ML - (14EA/CA 60CA/PF)

## (undated) DEVICE — KIT ANESTHESIA W/CIRCUIT & 3/LT BAG W/FILTER (20EA/CA)

## (undated) DEVICE — GOWN SURGEONS LARGE - (32/CA)

## (undated) DEVICE — TOWELS CLOTH SURGICAL - (4/PK 20PK/CA)

## (undated) DEVICE — DRAPE STRLE REG TOWEL 18X24 - (10/BX 4BX/CA)"

## (undated) DEVICE — GOWN SURGEONS X-LARGE - DISP. (30/CA)

## (undated) DEVICE — SUTURE GENERAL

## (undated) DEVICE — TAPE CLOTH MEDIPORE 6 INCH - (12RL/CA)

## (undated) DEVICE — GLOVE BIOGEL INDICATOR SZ 8.5 SURGICAL PF LTX - (50/BX 4BX/CA)

## (undated) DEVICE — HEADREST PRONEVIEW LARGE - (10/CA)

## (undated) DEVICE — KIT EVACUATER 3 SPRING PVC LF 1/8 DRAIN SIZE (10EA/CA)"

## (undated) DEVICE — NEPTUNE 4 PORT MANIFOLD - (20/PK)

## (undated) DEVICE — GLOVE BIOGEL INDICATOR SZ 6.5 SURGICAL PF LTX - (50PR/BX 4BX/CA)

## (undated) DEVICE — SUTURE 0 VICRYL PLUS CT-1 - 8 X 18 INCH (12/BX)

## (undated) DEVICE — SLEEVE, VASO, THIGH, MED

## (undated) DEVICE — GLOVE BIOGEL INDICATOR SZ 8 SURGICAL PF LTX - (50/BX 4BX/CA)

## (undated) DEVICE — PACK MAJOR ORTHO - (2EA/CA)

## (undated) DEVICE — GLOVE BIOGEL SZ 6.5 SURGICAL PF LTX (50PR/BX 4BX/CA)

## (undated) DEVICE — BOVIE BLADE COATED &INSULATED - 25/PK

## (undated) DEVICE — PROTECTOR ULNA NERVE - (36PR/CA)

## (undated) DEVICE — TRAY CATHETER FOLEY URINE METER W/STATLOCK 350ML (10EA/CA)

## (undated) DEVICE — HEAD HOLDER JUNIOR/ADULT

## (undated) DEVICE — MIDAS LUBRICATOR DIFFUSER PACK (4EA/CA)

## (undated) DEVICE — SUTURE 2-0 VICRYL PLUS CT-1 - 8 X 18 INCH(12/BX)

## (undated) DEVICE — SYSTEM PEEL & PLACE 13CM INCISIONS

## (undated) DEVICE — ARMREST CRADLE FOAM - (2PR/PK 12PR/CA)

## (undated) DEVICE — SENSOR SPO2 NEO LNCS ADHESIVE (20/BX) SEE USER NOTES

## (undated) DEVICE — NEEDLE NON SAFETY HYPO 22 GA X 1 1/2 IN (100/BX)

## (undated) DEVICE — GLOVE, BIOGEL ECLIPSE, SZ 7.0, PF LTX (50/BX)

## (undated) DEVICE — PATTIES SURG X-RAYCOTTONOID - 1/2 X 3 IN (200/CA)

## (undated) DEVICE — KIT ROOM DECONTAMINATION

## (undated) DEVICE — CANISTER SUCTION 3000ML MECHANICAL FILTER AUTO SHUTOFF MEDI-VAC NONSTERILE LF DISP  (40EA/CA)

## (undated) DEVICE — TUBING C&T SET FLYING LEADS DRAIN TUBING (10EA/BX)

## (undated) DEVICE — INTRAOP NEURO IN OR 1:1 PER 15 MIN

## (undated) DEVICE — SYRINGE SAFETY 3 ML 18 GA X 1 1/2 BLUNT LL (100/BX 8BX/CA)

## (undated) DEVICE — TUBING CLEARLINK DUO-VENT - C-FLO (48EA/CA)

## (undated) DEVICE — LACTATED RINGERS INJ. 500 ML - (24EA/CA)

## (undated) DEVICE — DRESSING ABDOMINAL PAD STERILE 8 X 10" (360EA/CA)"

## (undated) DEVICE — SET EXTENSION WITH 2 PORTS (48EA/CA) ***PART #2C8610 IS A SUBSTITUTE*****

## (undated) DEVICE — SET LEADWIRE 5 LEAD BEDSIDE DISPOSABLE ECG (1SET OF 5/EA)

## (undated) DEVICE — BONE MILL BM210

## (undated) DEVICE — PACK NEURO - (2EA/CA)

## (undated) DEVICE — DRAPE LARGE 3 QUARTER - (20/CA)

## (undated) DEVICE — SEALER BIPOLAR 2.3 AQUAMANTYS

## (undated) DEVICE — SUTURE 4-0 MONOCRYL PLUS PS-2 - 27 INCH (36/BX)

## (undated) DEVICE — KIT SURGIFLO W/OUT THROMBIN - (6EA/CA)

## (undated) DEVICE — DRESSING PETROLEUM GAUZE 5 X 9" (50EA/BX 4BX/CA)"

## (undated) DEVICE — SYRINGE SAFETY 10 ML 18 GA X 1 1/2 BLUNT LL (100/BX 4BX/CA)

## (undated) DEVICE — TUBE CONNECT SUCTION CLEAR 120 X 1/4" (50EA/CA)"

## (undated) DEVICE — MASK ANESTHESIA ADULT  - (100/CA)

## (undated) DEVICE — APPLICATOR COTTON TIP 6 IN - STERILE (2EA/PK 100PK/BX)